# Patient Record
Sex: FEMALE | Race: WHITE | Employment: OTHER | URBAN - METROPOLITAN AREA
[De-identification: names, ages, dates, MRNs, and addresses within clinical notes are randomized per-mention and may not be internally consistent; named-entity substitution may affect disease eponyms.]

---

## 2020-07-20 PROBLEM — R53.82 CHRONIC FATIGUE: Status: ACTIVE | Noted: 2020-07-20

## 2020-07-20 PROBLEM — F41.8 DEPRESSION WITH ANXIETY: Status: ACTIVE | Noted: 2020-07-20

## 2020-07-20 PROBLEM — G89.4 CHRONIC PAIN SYNDROME: Status: ACTIVE | Noted: 2020-07-20

## 2020-07-21 PROBLEM — K21.9 GASTROESOPHAGEAL REFLUX DISEASE WITHOUT ESOPHAGITIS: Status: ACTIVE | Noted: 2020-07-21

## 2020-07-21 PROBLEM — F12.90 MARIJUANA USE: Status: ACTIVE | Noted: 2020-07-21

## 2020-07-21 PROBLEM — Z72.0 TOBACCO USE: Status: ACTIVE | Noted: 2020-07-21

## 2020-07-21 PROBLEM — E03.9 ACQUIRED HYPOTHYROIDISM: Status: ACTIVE | Noted: 2020-07-21

## 2020-07-21 PROBLEM — R63.4 ABNORMAL WEIGHT LOSS: Status: ACTIVE | Noted: 2020-07-21

## 2020-08-05 PROBLEM — F13.20 BENZODIAZEPINE DEPENDENCE (HCC): Status: ACTIVE | Noted: 2020-08-05

## 2020-08-05 PROBLEM — G47.34 NOCTURNAL HYPOXEMIA: Status: ACTIVE | Noted: 2020-08-05

## 2020-08-05 PROBLEM — F11.20 OPIATE DEPENDENCE (HCC): Status: ACTIVE | Noted: 2020-08-05

## 2020-08-24 PROBLEM — E55.9 VITAMIN D DEFICIENCY: Status: ACTIVE | Noted: 2020-08-24

## 2020-08-24 PROBLEM — E53.8 VITAMIN B12 DEFICIENCY: Status: ACTIVE | Noted: 2020-08-24

## 2020-08-24 PROBLEM — J43.8 OTHER EMPHYSEMA (HCC): Status: ACTIVE | Noted: 2020-08-24

## 2021-03-09 ENCOUNTER — VIRTUAL VISIT (OUTPATIENT)
Dept: FAMILY MEDICINE CLINIC | Age: 66
End: 2021-03-09
Payer: MEDICARE

## 2021-03-09 DIAGNOSIS — J43.8 OTHER EMPHYSEMA (HCC): ICD-10-CM

## 2021-03-09 DIAGNOSIS — F13.20 BENZODIAZEPINE DEPENDENCE (HCC): ICD-10-CM

## 2021-03-09 PROCEDURE — 99213 OFFICE O/P EST LOW 20 MIN: CPT | Performed by: INTERNAL MEDICINE

## 2021-03-09 RX ORDER — MOMETASONE FUROATE 50 UG/1
2 SPRAY, METERED NASAL DAILY
Qty: 3 CONTAINER | Refills: 3 | Status: SHIPPED | OUTPATIENT
Start: 2021-03-09 | End: 2021-10-27

## 2021-03-09 NOTE — PROGRESS NOTES
1. Have you been to the ER, urgent care clinic since your last visit? Hospitalized since your last visit? No    2. Have you seen or consulted any other health care providers outside of the [address] [place] [address] since your last visit? Include any pap smears or colon screening.  No       Learning Assessment 7/15/2020   PRIMARY LEARNER Patient   PRIMARY LANGUAGE ENGLISH   LEARNER PREFERENCE PRIMARY LISTENING   ANSWERED BY patient   RELATIONSHIP SELF

## 2021-03-31 ENCOUNTER — CLINICAL SUPPORT (OUTPATIENT)
Dept: FAMILY MEDICINE CLINIC | Age: 66
End: 2021-03-31
Payer: MEDICARE

## 2021-03-31 VITALS — TEMPERATURE: 97.6 F

## 2021-03-31 DIAGNOSIS — E03.9 ACQUIRED HYPOTHYROIDISM: ICD-10-CM

## 2021-03-31 DIAGNOSIS — R79.9 ABNORMAL FINDING OF BLOOD CHEMISTRY, UNSPECIFIED: ICD-10-CM

## 2021-03-31 DIAGNOSIS — R53.82 CHRONIC FATIGUE: ICD-10-CM

## 2021-03-31 DIAGNOSIS — E55.9 VITAMIN D DEFICIENCY: Primary | ICD-10-CM

## 2021-03-31 DIAGNOSIS — I10 ESSENTIAL HYPERTENSION: ICD-10-CM

## 2021-03-31 DIAGNOSIS — E53.8 VITAMIN B12 DEFICIENCY: ICD-10-CM

## 2021-03-31 PROCEDURE — 36415 COLL VENOUS BLD VENIPUNCTURE: CPT | Performed by: INTERNAL MEDICINE

## 2021-03-31 NOTE — PROGRESS NOTES
Consent:  She and/or her healthcare decision maker is aware that this patient-initiated Telehealth encounter is a billable service, with coverage as determined by her insurance carrier. She is aware that she may receive a bill and has provided verbal consent to proceed: Yes    I was in the office while conducting this encounter via the doxy. me platform. Murphy Pearce is a 77 y.o. female who was seen by synchronous (real-time) audio-video technology on 3/9/2021. Pt was seen at home. No other participants in this encounter. Subjective:   Request For New Medication (Nasal Spray.)    Patient presents today virtually to discuss continuation of medication for rhinitis. Used to have a prescription from her previous provider; however, hadn't needed it until recently. She has been suffering from congestion, runny nose, and thinks this may be secondary to the pollen that is in the air since she is now living in [place] versus [place]. Otherwise she has been doing well and is feeling good. PMH, SH, Medications/Allergies: reviewed, on chart. Current Outpatient Medications   Medication Sig    mometasone (NASONEX) 50 mcg/actuation nasal spray 2 Sprays by Both Nostrils route daily.  lisinopriL (PRINIVIL, ZESTRIL) 20 mg tablet Take 0.5 Tabs by mouth daily. Indications: high blood pressure    levothyroxine (SYNTHROID) 125 mcg tablet Take 1 Tab by mouth Daily (before breakfast).  clonazePAM (KlonoPIN) 0.5 mg tablet Take 1.5 mg by mouth nightly. Per patient    melatonin 1 mg tablet Take 1 mg by mouth nightly.  ibuprofen (MOTRIN) 800 mg tablet Take 800 mg by mouth as needed for Pain.  aspirin 81 mg chewable tablet Take 81 mg by mouth daily. No current facility-administered medications for this visit. No Known Allergies    ROS:  Constitutional: No fever, chills or abnormal weight loss  Respiratory: No cough, SOB   CV: No chest pain or Palpitations    VS review:   Wt Readings from Last 3 Encounters:   08/19/20 146 lb (66.2 kg)   07/15/20 152 lb 8.9 oz (69.2 kg)   07/15/20 146 lb 3.2 oz (66.3 kg)     BP Readings from Last 3 Encounters:   08/19/20 120/78   07/15/20 132/72   07/15/20 110/80       Objective:     General: alert, cooperative, no distress   Mental  status: mental status: alert, oriented to person, place, and time, normal mood, behavior, speech, dress, motor activity, and thought processes   Resp: resp: normal effort and no respiratory distress   Neuro: neuro: no gross deficits   Skin: skin: no discoloration or lesions of concern on visible areas       Assessment & Plan:     Allergic rhinitis-we will refill her mometasone nasal spray. If this is not sufficient, consider addition of Astelin or Singulair for allergic rhinitis. Try to avoid triggers. Follow-up as regularly scheduled. Ewelina Huynh DO      Due to this being a TeleHealth evaluation, many elements of the physical examination are unable to be assessed. We discussed the expected course, resolution and complications of the diagnosis(es) in detail. Medication risks, benefits, costs, interactions, and alternatives were discussed as indicated. I advised her to contact the office if her condition worsens, changes or fails to improve as anticipated. She expressed understanding with the diagnosis(es) and plan. Pursuant to the emergency declaration under the [facility], [facility] waiver authority and the [employer] General Act, this Virtual  Visit was conducted, with patient's consent, to reduce the patient's risk of exposure to COVID-19 and provide continuity of care for an established patient. Services were provided through a video synchronous discussion virtually to substitute for in-person clinic visit. Services were provided through a synchronous discussion virtually to substitute for in-person clinic visit. I was in the office.  The patient was at home.     CPT Codes 15078-81777 for Established Patients may apply to this Telehealth Visit

## 2021-03-31 NOTE — PROGRESS NOTES
Pt presents to clinic for lab work. She denies sick sx today. Labs drawn from [facility], 1 attempt. Pt tolerated well.  KT

## 2021-04-01 LAB
25(OH)D3 SERPL-MCNC: 17.1 NG/ML (ref 30–100)
ALBUMIN SERPL-MCNC: 4.2 G/DL (ref 3.5–5)
ALBUMIN/GLOB SERPL: 1.2 {RATIO} (ref 1.1–2.2)
ALP SERPL-CCNC: 88 U/L (ref 45–117)
ALT SERPL-CCNC: 15 U/L (ref 12–78)
ANION GAP SERPL CALC-SCNC: 6 MMOL/L (ref 5–15)
AST SERPL-CCNC: 8 U/L (ref 15–37)
BASOPHILS # BLD: 0.1 K/UL (ref 0–0.1)
BASOPHILS NFR BLD: 1 % (ref 0–1)
BILIRUB SERPL-MCNC: 0.8 MG/DL (ref 0.2–1)
BUN SERPL-MCNC: 14 MG/DL (ref 6–20)
BUN/CREAT SERPL: 21 (ref 12–20)
CALCIUM SERPL-MCNC: 9.3 MG/DL (ref 8.5–10.1)
CHLORIDE SERPL-SCNC: 106 MMOL/L (ref 97–108)
CO2 SERPL-SCNC: 28 MMOL/L (ref 21–32)
COMMENT, HOLDF: NORMAL
CREAT SERPL-MCNC: 0.67 MG/DL (ref 0.55–1.02)
DIFFERENTIAL METHOD BLD: ABNORMAL
EOSINOPHIL # BLD: 0.1 K/UL (ref 0–0.4)
EOSINOPHIL NFR BLD: 1 % (ref 0–7)
ERYTHROCYTE [DISTWIDTH] IN BLOOD BY AUTOMATED COUNT: 13 % (ref 11.5–14.5)
FOLATE SERPL-MCNC: 64.1 NG/ML (ref 5–21)
GLOBULIN SER CALC-MCNC: 3.6 G/DL (ref 2–4)
GLUCOSE SERPL-MCNC: 94 MG/DL (ref 65–100)
HCT VFR BLD AUTO: 47.7 % (ref 35–47)
HGB BLD-MCNC: 15.6 G/DL (ref 11.5–16)
IMM GRANULOCYTES # BLD AUTO: 0 K/UL (ref 0–0.04)
IMM GRANULOCYTES NFR BLD AUTO: 0 % (ref 0–0.5)
LYMPHOCYTES # BLD: 2.6 K/UL (ref 0.8–3.5)
LYMPHOCYTES NFR BLD: 25 % (ref 12–49)
MCH RBC QN AUTO: 32.8 PG (ref 26–34)
MCHC RBC AUTO-ENTMCNC: 32.7 G/DL (ref 30–36.5)
MCV RBC AUTO: 100.2 FL (ref 80–99)
MONOCYTES # BLD: 0.7 K/UL (ref 0–1)
MONOCYTES NFR BLD: 7 % (ref 5–13)
NEUTS SEG # BLD: 6.9 K/UL (ref 1.8–8)
NEUTS SEG NFR BLD: 66 % (ref 32–75)
NRBC # BLD: 0 K/UL (ref 0–0.01)
NRBC BLD-RTO: 0 PER 100 WBC
PLATELET # BLD AUTO: 224 K/UL (ref 150–400)
PMV BLD AUTO: 11.3 FL (ref 8.9–12.9)
POTASSIUM SERPL-SCNC: 3.9 MMOL/L (ref 3.5–5.1)
PROT SERPL-MCNC: 7.8 G/DL (ref 6.4–8.2)
RBC # BLD AUTO: 4.76 M/UL (ref 3.8–5.2)
SAMPLES BEING HELD,HOLD: NORMAL
SODIUM SERPL-SCNC: 140 MMOL/L (ref 136–145)
T4 FREE SERPL-MCNC: 1.1 NG/DL (ref 0.8–1.5)
TSH SERPL DL<=0.05 MIU/L-ACNC: 4.37 UIU/ML (ref 0.36–3.74)
VIT B12 SERPL-MCNC: 1038 PG/ML (ref 193–986)
WBC # BLD AUTO: 10.4 K/UL (ref 3.6–11)

## 2021-04-02 LAB
T3RU NFR SERPL: 33 % (ref 24–39)
THYROGLOB AB SERPL-ACNC: 28.7 IU/ML (ref 0–0.9)
THYROPEROXIDASE AB SERPL-ACNC: >600 IU/ML (ref 0–34)

## 2021-04-07 ENCOUNTER — TELEPHONE (OUTPATIENT)
Dept: FAMILY MEDICINE CLINIC | Age: 66
End: 2021-04-07

## 2021-04-07 NOTE — TELEPHONE ENCOUNTER
Sp/w Annemarie in the lab, she is going to have to investigate why A1C and Lipid panel wasn't done. She will call me back.  KT

## 2021-04-07 NOTE — TELEPHONE ENCOUNTER
----- Message from Brooklynn Johnson sent at 4/7/2021  3:29 PM EDT -----  Regarding: Dr. Joan Sanders first and last name: pt      Reason for call: would like to know test results      Callback required yes/no and why: yes      Best contact number(s): [phone]      Details to clarify the request:      Brooklynn Johnson

## 2021-04-09 RX ORDER — ERGOCALCIFEROL 1.25 MG/1
50000 CAPSULE ORAL
Qty: 12 CAP | Refills: 0 | Status: SHIPPED | OUTPATIENT
Start: 2021-04-09 | End: 2021-06-26

## 2021-04-09 RX ORDER — LEVOTHYROXINE SODIUM 150 UG/1
150 TABLET ORAL
Qty: 90 TAB | Refills: 1 | Status: SHIPPED | OUTPATIENT
Start: 2021-04-09 | End: 2021-10-18 | Stop reason: SDUPTHER

## 2021-04-09 RX ORDER — LEVOTHYROXINE SODIUM 25 UG/1
25 TABLET ORAL
Qty: 30 TAB | Refills: 1 | Status: SHIPPED | OUTPATIENT
Start: 2021-04-09 | End: 2021-10-18

## 2021-04-11 NOTE — PROGRESS NOTES
Labs indicate that she has an antibody that is causing her to have a low thyroid.  We can recheck your TSH in 6-8 weeks to see if we need to readjust your medication. Vit D low, sent in a supplement. Everything else, overall, looks good!

## 2021-04-12 NOTE — PROGRESS NOTES
Patient verified stating name and date of birth. Patient informed of lab results and states understanding. She was unclear what her new dose of Thyroid medication is  And does she start this now or wilt until her next Thyroid blood work is done.

## 2021-04-13 ENCOUNTER — TELEPHONE (OUTPATIENT)
Dept: FAMILY MEDICINE CLINIC | Age: 66
End: 2021-04-13

## 2021-04-13 NOTE — TELEPHONE ENCOUNTER
----- Message from Sophie Reina sent at 4/12/2021 12:41 PM EDT -----  Regarding: /Telephone      General Message/Vendor Calls    Caller's first and last name: Self      Reason for call: Pt needs clarification on medications and would like to  lab results. Callback required yes/no and why: Yes to assist.       Best contact number(s): [phone]      Details to clarify the request: Pt has questions about medication that was sent to pharmacy for her, Thyroid medications one in 150mg and one 50mg as well as Vitamin D2. Pt was not aware of any changes in her meds and needs clarification.        Sophie Huang

## 2021-05-05 RX ORDER — LEVOTHYROXINE SODIUM 25 UG/1
25 TABLET ORAL
Qty: 30 TAB | Refills: 1 | OUTPATIENT
Start: 2021-05-05

## 2021-07-27 ENCOUNTER — OFFICE VISIT (OUTPATIENT)
Dept: FAMILY MEDICINE CLINIC | Age: 66
End: 2021-07-27
Payer: MEDICARE

## 2021-07-27 VITALS
WEIGHT: 157.8 LBS | RESPIRATION RATE: 16 BRPM | SYSTOLIC BLOOD PRESSURE: 160 MMHG | OXYGEN SATURATION: 98 % | HEART RATE: 73 BPM | BODY MASS INDEX: 24.77 KG/M2 | HEIGHT: 67 IN | DIASTOLIC BLOOD PRESSURE: 90 MMHG | TEMPERATURE: 97.1 F

## 2021-07-27 DIAGNOSIS — R53.82 CHRONIC FATIGUE: ICD-10-CM

## 2021-07-27 DIAGNOSIS — F32.A DEPRESSION, UNSPECIFIED DEPRESSION TYPE: ICD-10-CM

## 2021-07-27 DIAGNOSIS — E03.9 ACQUIRED HYPOTHYROIDISM: ICD-10-CM

## 2021-07-27 DIAGNOSIS — I10 ESSENTIAL HYPERTENSION: Primary | ICD-10-CM

## 2021-07-27 DIAGNOSIS — E55.9 VITAMIN D DEFICIENCY: ICD-10-CM

## 2021-07-27 PROCEDURE — 36415 COLL VENOUS BLD VENIPUNCTURE: CPT | Performed by: NURSE PRACTITIONER

## 2021-07-27 PROCEDURE — 99214 OFFICE O/P EST MOD 30 MIN: CPT | Performed by: NURSE PRACTITIONER

## 2021-07-27 RX ORDER — CHOLECALCIFEROL (VITAMIN D3) 125 MCG
CAPSULE ORAL
COMMUNITY

## 2021-07-27 RX ORDER — TRAZODONE HYDROCHLORIDE 50 MG/1
50 TABLET ORAL
Qty: 30 TABLET | Refills: 2 | Status: SHIPPED | OUTPATIENT
Start: 2021-07-27 | End: 2021-08-23 | Stop reason: SDUPTHER

## 2021-07-27 NOTE — PROGRESS NOTES
1. Have you been to the ER, urgent care clinic since your last visit? Hospitalized since your last visit? No    2. Have you seen or consulted any other health care providers outside of the [address] [place] [address] since your last visit? Include any pap smears or colon screening.  No      Chief Complaint   Patient presents with    Fatigue    Thyroid Problem     need labs    Cholesterol Problem     need labs         Visit Vitals  BP (!) 160/90 (BP 1 Location: Right upper arm, BP Patient Position: Sitting, BP Cuff Size: Large adult)   Pulse 73   Temp 97.1 °F (36.2 °C) (Temporal)   Resp 16   Ht 5' 7\" (1.702 m)   Wt 157 lb 12.8 oz (71.6 kg)   SpO2 98%   BMI 24.71 kg/m²       Pain Scale: 7/10  Pain Location: Generalized

## 2021-07-28 LAB
25(OH)D3 SERPL-MCNC: 46.1 NG/ML (ref 30–100)
ALBUMIN SERPL-MCNC: 4.3 G/DL (ref 3.5–5)
ALBUMIN/GLOB SERPL: 1.2 {RATIO} (ref 1.1–2.2)
ALP SERPL-CCNC: 72 U/L (ref 45–117)
ALT SERPL-CCNC: 16 U/L (ref 12–78)
ANION GAP SERPL CALC-SCNC: 5 MMOL/L (ref 5–15)
AST SERPL-CCNC: 12 U/L (ref 15–37)
BASOPHILS # BLD: 0.1 K/UL (ref 0–0.1)
BASOPHILS NFR BLD: 1 % (ref 0–1)
BILIRUB SERPL-MCNC: 0.6 MG/DL (ref 0.2–1)
BUN SERPL-MCNC: 11 MG/DL (ref 6–20)
BUN/CREAT SERPL: 17 (ref 12–20)
CALCIUM SERPL-MCNC: 10 MG/DL (ref 8.5–10.1)
CHLORIDE SERPL-SCNC: 109 MMOL/L (ref 97–108)
CHOLEST SERPL-MCNC: 220 MG/DL
CO2 SERPL-SCNC: 30 MMOL/L (ref 21–32)
CREAT SERPL-MCNC: 0.64 MG/DL (ref 0.55–1.02)
DIFFERENTIAL METHOD BLD: ABNORMAL
EOSINOPHIL # BLD: 0.1 K/UL (ref 0–0.4)
EOSINOPHIL NFR BLD: 1 % (ref 0–7)
ERYTHROCYTE [DISTWIDTH] IN BLOOD BY AUTOMATED COUNT: 13.3 % (ref 11.5–14.5)
GLOBULIN SER CALC-MCNC: 3.5 G/DL (ref 2–4)
GLUCOSE SERPL-MCNC: 86 MG/DL (ref 65–100)
HCT VFR BLD AUTO: 46 % (ref 35–47)
HDLC SERPL-MCNC: 90 MG/DL
HDLC SERPL: 2.4 {RATIO} (ref 0–5)
HGB BLD-MCNC: 15.1 G/DL (ref 11.5–16)
IMM GRANULOCYTES # BLD AUTO: 0.1 K/UL (ref 0–0.04)
IMM GRANULOCYTES NFR BLD AUTO: 1 % (ref 0–0.5)
LDLC SERPL CALC-MCNC: 111.6 MG/DL (ref 0–100)
LYMPHOCYTES # BLD: 2.4 K/UL (ref 0.8–3.5)
LYMPHOCYTES NFR BLD: 26 % (ref 12–49)
MCH RBC QN AUTO: 33.6 PG (ref 26–34)
MCHC RBC AUTO-ENTMCNC: 32.8 G/DL (ref 30–36.5)
MCV RBC AUTO: 102.4 FL (ref 80–99)
MONOCYTES # BLD: 0.6 K/UL (ref 0–1)
MONOCYTES NFR BLD: 7 % (ref 5–13)
NEUTS SEG # BLD: 6 K/UL (ref 1.8–8)
NEUTS SEG NFR BLD: 64 % (ref 32–75)
NRBC # BLD: 0 K/UL (ref 0–0.01)
NRBC BLD-RTO: 0 PER 100 WBC
PLATELET # BLD AUTO: 226 K/UL (ref 150–400)
PMV BLD AUTO: 11 FL (ref 8.9–12.9)
POTASSIUM SERPL-SCNC: 4.8 MMOL/L (ref 3.5–5.1)
PROT SERPL-MCNC: 7.8 G/DL (ref 6.4–8.2)
RBC # BLD AUTO: 4.49 M/UL (ref 3.8–5.2)
SODIUM SERPL-SCNC: 144 MMOL/L (ref 136–145)
TRIGL SERPL-MCNC: 92 MG/DL (ref ?–150)
TSH SERPL DL<=0.05 MIU/L-ACNC: 9.63 UIU/ML (ref 0.36–3.74)
VLDLC SERPL CALC-MCNC: 18.4 MG/DL
WBC # BLD AUTO: 9.2 K/UL (ref 3.6–11)

## 2021-07-28 NOTE — ACP (ADVANCE CARE PLANNING)
Discussed importance of advanced medical directives with patient. Patient is capable of making decisions.  Bossman [place] NP-C

## 2021-07-28 NOTE — PROGRESS NOTES
Subjective: Fred Leon is a 74 y.o. female who presents today with the following:  Chief Complaint   Patient presents with    Fatigue    Thyroid Problem     need labs    Cholesterol Problem     need labs       Patient Active Problem List   Diagnosis Code    Chronic fatigue R53.82    Chronic pain syndrome G89.4    Depression with anxiety F41.8    Abnormal weight loss R63.4    Acquired hypothyroidism E03.9    Gastroesophageal reflux disease without esophagitis K21.9    Tobacco use Z72.0    Marijuana use F12.90    Nocturnal hypoxemia G47.34    Opiate dependence (HCC) F11.20    Benzodiazepine dependence (HCC) F13.20    Other emphysema (HCC) J43.8    Vitamin B12 deficiency E53.8    Vitamin D deficiency E55.9         COMPLIANT WITH MEDICATION:   HTN; Denies chest pain, dyspnea, palpitations, headache and blurred vision. Blood pressure normotensive. depression screening addressed active having intermittent thoughts about her daughter that passed away. abuse screening addressed denies    learning assessment addressed reviewed nurses notes    fall risk addressed not at risk    HM: addressed     ROS:  Gen: denies fever, chills, fatigue, weight loss, weight gain  HEENT:denies blurry vision, nasal congestion, sore throat  Resp: denies dypsnea, cough, wheezing  CV: denies chest pain radiating to the jaws or arms, palpitations, lower extremity edema  Abd: denies nausea, vomiting, diarrhea, constipation  Neuro: denies numbness/tingling  Endo: denies polyuria, polydipsia, heat/cold intolerance  Heme: no lymphadenopathy    No Known Allergies      Current Outpatient Medications:     melatonin 5 mg tablet, Take  by mouth nightly. Takes 2 at bedtime, Disp: , Rfl:     traZODone (DESYREL) 50 mg tablet, Take 1 Tablet by mouth nightly. Indications: insomnia, Disp: 30 Tablet, Rfl: 2    levothyroxine (SYNTHROID) 25 mcg tablet, Take 1 Tab by mouth Daily (before breakfast).  To be added to the 125 mcg until she has completed those tablets for a total of 150 mcg, Disp: 30 Tab, Rfl: 1    lisinopriL (PRINIVIL, ZESTRIL) 20 mg tablet, Take 0.5 Tabs by mouth daily. Indications: high blood pressure, Disp: 45 Tab, Rfl: 3    levothyroxine (SYNTHROID) 125 mcg tablet, Take 1 Tab by mouth Daily (before breakfast). , Disp: 90 Tab, Rfl: 3    aspirin 81 mg chewable tablet, Take 81 mg by mouth daily. , Disp: , Rfl:     levothyroxine (SYNTHROID) 150 mcg tablet, Take 1 Tab by mouth Daily (before breakfast). (Patient not taking: Reported on 7/27/2021), Disp: 90 Tab, Rfl: 1    mometasone (NASONEX) 50 mcg/actuation nasal spray, 2 Sprays by Both Nostrils route daily. (Patient not taking: Reported on 7/27/2021), Disp: 3 Container, Rfl: 3    clonazePAM (KlonoPIN) 0.5 mg tablet, Take 1.5 mg by mouth nightly. Per patient (Patient not taking: Reported on 7/27/2021), Disp: , Rfl:     melatonin 1 mg tablet, Take 1 mg by mouth nightly. (Patient not taking: Reported on 7/27/2021), Disp: , Rfl:     ibuprofen (MOTRIN) 800 mg tablet, Take 800 mg by mouth as needed for Pain.  (Patient not taking: Reported on 7/27/2021), Disp: , Rfl:     Past Medical History:   Diagnosis Date    Cataract     Chronic pain of right groin     after multiple complications from bladder sling that became imbedded    GERD (gastroesophageal reflux disease)     HTN (hypertension)     Infiltrating ductal carcinoma of overlapping sites of both breasts in [facility])     Thyroid disease     UTI (urinary tract infection)        Past Surgical History:   Procedure Laterality Date    HX BLADDER SUSPENSION      Removed     HX CATARACT REMOVAL Bilateral     HX HYSTERECTOMY      HX LYMPHADENECTOMY      HX TONSILLECTOMY      VT BIOPSY OF BREAST, INCISIONAL Bilateral        Social History     Tobacco Use   Smoking Status Current Every Day Smoker    Packs/day: 1.00    Types: Cigarettes   Smokeless Tobacco Never Used       Social History     Socioeconomic History    Marital status:      Spouse name: Not on file    Number of children: Not on file    Years of education: Not on file    Highest education level: Not on file   Tobacco Use    Smoking status: Current Every Day Smoker     Packs/day: 1.00     Types: Cigarettes    Smokeless tobacco: Never Used   Vaping Use    Vaping Use: Former   Substance and Sexual Activity    Alcohol use: Yes     Comment: rarely    Drug use: Yes     Types: Marijuana, Prescription     Comment: nightly(pain relief)    Sexual activity: Not Currently   Other Topics Concern     Service No    Blood Transfusions No    Caffeine Concern No    Occupational Exposure No    Hobby Hazards No    Sleep Concern No    Stress Concern No    Weight Concern Yes    Special Diet No    Back Care Yes    Exercise Yes    Bike Helmet No    Seat Belt Yes    Self-Exams Yes     Social Determinants of Health     Financial Resource Strain:     Difficulty of Paying Living Expenses:    Food Insecurity:     Worried About Running Out of Food in the Last Year:     [address] in the Last Year:    Transportation Needs:     Lack of Transportation (Medical):      Lack of Transportation (Non-Medical):    Physical Activity:     Days of Exercise per Week:     Minutes of Exercise per Session:    Stress:     Feeling of Stress :    Social Connections:     Frequency of Communication with Friends and Family:     Frequency of Social Gatherings with Friends and Family:     Attends [employer] Services:     Active Member of Clubs or Organizations:     Attends Club or Organization Meetings:     Marital Status:        Family History   Problem Relation Age of Onset    MS Sister          Objective:     Visit Vitals  BP (!) 160/90 (BP 1 Location: Right upper arm, BP Patient Position: Sitting, BP Cuff Size: Large adult)   Pulse 73   Temp 97.1 °F (36.2 °C) (Temporal)   Resp 16   Ht 5' 7\" (1.702 m)   Wt 157 lb 12.8 oz (71.6 kg)   SpO2 98%   BMI 24.71 kg/m² Body mass index is 24.71 kg/m². General: Alert and oriented. No acute distress. Well nourished  HEENT :  Ears:TMs are normal. Canals are clear. Eyes: pupils equal, round, react to light and accommodation. Extra ocular movements intact. Nose: patent. Mouth and throat is clear. Neck:supple full range of motion no thyromegaly. Trachea midline, No carotid bruits. No significant lymphadenopathy  Lungs[de-identified] clear to auscultation without wheezes, rales, or rhonchi. Heart :RRR, S1 & S2 are normal intensity. No murmur; no gallop  Abdomen: bowel sounds active. No tenderness, guarding, rebound, masses, hepatic or spleen enlargement  Back: no CVA tenderness. Extremities: without clubbing, cyanosis, or edema  Pulses: radial and femoral pulses are normal  Neuro: HMF intact. Cranial nerves II through XII grossly normal.  Motor: is 5 over 5 and symmetrical.   Deep tendon reflexes: +2 equal  Psych:appropriate behavior, mood, affect and judgement.    Results for orders placed or performed in visit on 03/31/21   VITAMIN B12 & FOLATE   Result Value Ref Range    Vitamin B12 1,038 (H) 193 - 986 pg/mL    Folate 64.1 (H) 5.0 - 21.0 ng/mL   THYROID ANTIBODY PANEL   Result Value Ref Range    Thyroid peroxidase Ab >600 (H) 0 - 34 IU/mL    Thyroglobulin Ab 28.7 (H) 0.0 - 0.9 IU/mL   T3 UPTAKE PROFILE   Result Value Ref Range    T3 Uptake 33 24 - 39 %   T4, FREE   Result Value Ref Range    T4, Free 1.1 0.8 - 1.5 NG/DL   TSH 3RD GENERATION   Result Value Ref Range    TSH 4.37 (H) 0.36 - 3.74 uIU/mL   VITAMIN D, 25 HYDROXY   Result Value Ref Range    Vitamin D 25-Hydroxy 17.1 (L) 30 - 100 ng/mL   CBC WITH AUTOMATED DIFF   Result Value Ref Range    WBC 10.4 3.6 - 11.0 K/uL    RBC 4.76 3.80 - 5.20 M/uL    HGB 15.6 11.5 - 16.0 g/dL    HCT 47.7 (H) 35.0 - 47.0 %    .2 (H) 80.0 - 99.0 FL    MCH 32.8 26.0 - 34.0 PG    MCHC 32.7 30.0 - 36.5 g/dL    RDW 13.0 11.5 - 14.5 %    PLATELET 973 846 - 680 K/uL    MPV 11.3 8.9 - 12.9 FL    NRBC 0.0 0  WBC    ABSOLUTE NRBC 0.00 0.00 - 0.01 K/uL    NEUTROPHILS 66 32 - 75 %    LYMPHOCYTES 25 12 - 49 %    MONOCYTES 7 5 - 13 %    EOSINOPHILS 1 0 - 7 %    BASOPHILS 1 0 - 1 %    IMMATURE GRANULOCYTES 0 0.0 - 0.5 %    ABS. NEUTROPHILS 6.9 1.8 - 8.0 K/UL    ABS. LYMPHOCYTES 2.6 0.8 - 3.5 K/UL    ABS. MONOCYTES 0.7 0.0 - 1.0 K/UL    ABS. EOSINOPHILS 0.1 0.0 - 0.4 K/UL    ABS. BASOPHILS 0.1 0.0 - 0.1 K/UL    ABS. IMM. GRANS. 0.0 0.00 - 0.04 K/UL    DF AUTOMATED     METABOLIC PANEL, COMPREHENSIVE   Result Value Ref Range    Sodium 140 136 - 145 mmol/L    Potassium 3.9 3.5 - 5.1 mmol/L    Chloride 106 97 - 108 mmol/L    CO2 28 21 - 32 mmol/L    Anion gap 6 5 - 15 mmol/L    Glucose 94 65 - 100 mg/dL    BUN 14 6 - 20 MG/DL    Creatinine 0.67 0.55 - 1.02 MG/DL    BUN/Creatinine ratio 21 (H) 12 - 20      GFR est AA >60 >60 ml/min/1.73m2    GFR est non-AA >60 >60 ml/min/1.73m2    Calcium 9.3 8.5 - 10.1 MG/DL    Bilirubin, total 0.8 0.2 - 1.0 MG/DL    ALT (SGPT) 15 12 - 78 U/L    AST (SGOT) 8 (L) 15 - 37 U/L    Alk. phosphatase 88 45 - 117 U/L    Protein, total 7.8 6.4 - 8.2 g/dL    Albumin 4.2 3.5 - 5.0 g/dL    Globulin 3.6 2.0 - 4.0 g/dL    A-G Ratio 1.2 1.1 - 2.2     SAMPLES BEING HELD   Result Value Ref Range    SAMPLES BEING HELD 1LAV     COMMENT        Add-on orders for these samples will be processed based on acceptable specimen integrity and analyte stability, which may vary by analyte. No results found for this visit on 07/27/21. Assessment/ Plan:     1. Vitamin D deficiency    - CBC WITH AUTOMATED DIFF; Future  - LIPID PANEL; Future  - METABOLIC PANEL, COMPREHENSIVE; Future  - COLLECTION VENOUS BLOOD,VENIPUNCTURE; Future  - TSH 3RD GENERATION; Future  - VITAMIN D, 25 HYDROXY; Future  - VITAMIN D, 25 HYDROXY  - TSH 3RD GENERATION  - COLLECTION VENOUS BLOOD,VENIPUNCTURE  - METABOLIC PANEL, COMPREHENSIVE  - LIPID PANEL  - CBC WITH AUTOMATED DIFF    2.  Acquired hypothyroidism  - CBC WITH AUTOMATED DIFF; Future  - LIPID PANEL; Future  - METABOLIC PANEL, COMPREHENSIVE; Future  - COLLECTION VENOUS BLOOD,VENIPUNCTURE; Future  - TSH 3RD GENERATION; Future  - VITAMIN D, 25 HYDROXY; Future  - VITAMIN D, 25 HYDROXY  - TSH 3RD GENERATION  - COLLECTION VENOUS BLOOD,VENIPUNCTURE  - METABOLIC PANEL, COMPREHENSIVE  - LIPID PANEL  - CBC WITH AUTOMATED DIFF    3. Essential hypertension  BP elevated She had stopped all of her medications   - CBC WITH AUTOMATED DIFF; Future  - LIPID PANEL; Future  - METABOLIC PANEL, COMPREHENSIVE; Future  - COLLECTION VENOUS BLOOD,VENIPUNCTURE; Future  - TSH 3RD GENERATION; Future  - VITAMIN D, 25 HYDROXY; Future  - VITAMIN D, 25 HYDROXY  - TSH 3RD GENERATION  - COLLECTION VENOUS BLOOD,VENIPUNCTURE  - METABOLIC PANEL, COMPREHENSIVE  - LIPID PANEL  - CBC WITH AUTOMATED DIFF    4. Chronic fatigue    - CBC WITH AUTOMATED DIFF; Future  - LIPID PANEL; Future  - METABOLIC PANEL, COMPREHENSIVE; Future  - COLLECTION VENOUS BLOOD,VENIPUNCTURE; Future  - TSH 3RD GENERATION; Future  - VITAMIN D, 25 HYDROXY; Future  - VITAMIN D, 25 HYDROXY  - TSH 3RD GENERATION  - COLLECTION VENOUS BLOOD,VENIPUNCTURE  - METABOLIC PANEL, COMPREHENSIVE  - LIPID PANEL  - CBC WITH AUTOMATED DIFF    5. Depression, unspecified depression type  Start trazodone to help with depression and insomnia. Starting to see a therapist this week. No suicidal thoughts or plans to harm herself or others. Instructed to call 911 or contact provider if condition worsesn  - CBC WITH AUTOMATED DIFF; Future  - LIPID PANEL; Future  - METABOLIC PANEL, COMPREHENSIVE; Future  - COLLECTION VENOUS BLOOD,VENIPUNCTURE; Future  - TSH 3RD GENERATION;  Future  - VITAMIN D, 25 HYDROXY; Future  - VITAMIN D, 25 HYDROXY  - TSH 3RD GENERATION  - COLLECTION VENOUS BLOOD,VENIPUNCTURE  - METABOLIC PANEL, COMPREHENSIVE  - LIPID PANEL  - CBC WITH AUTOMATED DIFF      Orders Placed This Encounter    COLLECTION VENOUS BLOOD,VENIPUNCTURE     Standing Status:   Future Number of Occurrences:   1     Standing Expiration Date:   8/27/2021    CBC WITH AUTOMATED DIFF     Standing Status:   Future     Number of Occurrences:   1     Standing Expiration Date:   8/27/2021    LIPID PANEL     Standing Status:   Future     Number of Occurrences:   1     Standing Expiration Date:   8/87/0047    METABOLIC PANEL, COMPREHENSIVE     Standing Status:   Future     Number of Occurrences:   1     Standing Expiration Date:   8/27/2021    TSH 3RD GENERATION     Standing Status:   Future     Number of Occurrences:   1     Standing Expiration Date:   7/27/2022    VITAMIN D, 25 HYDROXY     Standing Status:   Future     Number of Occurrences:   1     Standing Expiration Date:   7/27/2022    melatonin 5 mg tablet     Sig: Take  by mouth nightly. Takes 2 at bedtime    traZODone (DESYREL) 50 mg tablet     Sig: Take 1 Tablet by mouth nightly. Indications: insomnia     Dispense:  30 Tablet     Refill:  2         Verbal and written instructions (see AVS) provided. Patient expresses understanding of diagnosis and treatment plan.     Health Maintenance Due   Topic Date Due    Breast Cancer Screen Mammogram  Never done    Lipid Screen  Never done    Colorectal Cancer Screening Combo  Never done    Shingrix Vaccine Age 50> (1 of 2) Never done    Medicare Yearly Exam  08/20/2021               ANDREW Horner

## 2021-07-29 ENCOUNTER — TELEPHONE (OUTPATIENT)
Dept: FAMILY MEDICINE CLINIC | Age: 66
End: 2021-07-29

## 2021-07-29 RX ORDER — LEVOTHYROXINE SODIUM 25 UG/1
25 TABLET ORAL
Qty: 90 TABLET | Refills: 0 | Status: SHIPPED | OUTPATIENT
Start: 2021-07-29 | End: 2021-10-18

## 2021-07-29 NOTE — PROGRESS NOTES
Patient verified stating name and date of birth. Patient informed of lab results and states understanding. She needs to know what does of her thyroid, she has 125 mg but will need 25 mg to take 150 mg she had been taking per DR Solis.  Please send to [facility] in [place] , please advise

## 2021-07-29 NOTE — PROGRESS NOTES
Metabolic panel good liver and kidney functions   Lipid panel LDL less desirable cholesterol is slightly elevated . Encourage heart healthy diet. Lots of fruits and vegetables , lean meats , chicken and fish.    CBC no anemia

## 2021-07-29 NOTE — TELEPHONE ENCOUNTER
----- Message from Michelle Anderson sent at 7/29/2021 11:02 AM EDT -----  Regarding: SERAFIN Dos Santos/ Telephone  Patient return call    Caller's first and last name and relationship (if not the patient): n/a      Best contact number(s): [phone]      Whose call is being returned: David Marroquin      Details to clarify the request: Returning call to receive lab results      Michelle Anderson

## 2021-07-29 NOTE — PROGRESS NOTES
Good news vitamin D level is perfect! TSH is doubled since last check . Please restart levothyroxine. Does she need a refill ? Which pharmacy?

## 2021-08-02 NOTE — PROGRESS NOTES
Patient verified stating name and date of birth. Patient informed of lab results and states understanding.  What dose do you want her thyroid Rx

## 2021-08-02 NOTE — PROGRESS NOTES
Toniei she has 125 mg of her thyroid medication, if you you want her to take 150 mcg she will need 25 mg

## 2021-08-04 ENCOUNTER — TELEPHONE (OUTPATIENT)
Dept: FAMILY MEDICINE CLINIC | Age: 66
End: 2021-08-04

## 2021-08-04 NOTE — TELEPHONE ENCOUNTER
----- Message from Alba Fleischer sent at 8/4/2021 11:52 AM EDT -----  Regarding: SERAFIN Dos Santos/Telephone  Patient return call    Caller's first and last name and relationship (if not the patient): pt      Best contact number(s): [phone]      Whose call is being returned: Emaline Net      Details to clarify the request: pt advised she does have the 150 of the levothyroxine and she didn't  the 125 because she had a whole bottle of the 150. No call back needed.       Alba Fleischer

## 2021-10-15 VITALS — HEIGHT: 66 IN | BODY MASS INDEX: 25.55 KG/M2 | WEIGHT: 159 LBS

## 2021-10-15 PROBLEM — M48.062 LUMBAR STENOSIS WITH NEUROGENIC CLAUDICATION: Status: ACTIVE | Noted: 2021-10-15

## 2021-10-15 PROBLEM — M43.10 SPONDYLISTHESIS: Status: ACTIVE | Noted: 2021-10-15

## 2021-10-18 ENCOUNTER — OFFICE VISIT (OUTPATIENT)
Dept: FAMILY MEDICINE CLINIC | Age: 66
End: 2021-10-18
Payer: MEDICARE

## 2021-10-18 DIAGNOSIS — Z23 ENCOUNTER FOR IMMUNIZATION: ICD-10-CM

## 2021-10-18 DIAGNOSIS — R79.9 ABNORMAL FINDING OF BLOOD CHEMISTRY, UNSPECIFIED: ICD-10-CM

## 2021-10-18 DIAGNOSIS — Z00.00 MEDICARE ANNUAL WELLNESS VISIT, SUBSEQUENT: ICD-10-CM

## 2021-10-18 DIAGNOSIS — I10 PRIMARY HYPERTENSION: ICD-10-CM

## 2021-10-18 DIAGNOSIS — E03.9 ACQUIRED HYPOTHYROIDISM: ICD-10-CM

## 2021-10-18 DIAGNOSIS — C50.411 MALIGNANT NEOPLASM OF UPPER-OUTER QUADRANT OF RIGHT FEMALE BREAST, UNSPECIFIED ESTROGEN RECEPTOR STATUS (HCC): ICD-10-CM

## 2021-10-18 DIAGNOSIS — Z12.31 ENCOUNTER FOR SCREENING MAMMOGRAM FOR MALIGNANT NEOPLASM OF BREAST: ICD-10-CM

## 2021-10-18 DIAGNOSIS — R93.89 ABNORMAL CXR: ICD-10-CM

## 2021-10-18 DIAGNOSIS — Z01.818 PREOP EXAMINATION: Primary | ICD-10-CM

## 2021-10-18 DIAGNOSIS — J42 CHRONIC BRONCHITIS, UNSPECIFIED CHRONIC BRONCHITIS TYPE (HCC): ICD-10-CM

## 2021-10-18 DIAGNOSIS — Z13.39 SCREENING FOR ALCOHOLISM: ICD-10-CM

## 2021-10-18 DIAGNOSIS — N64.89 OTHER SPECIFIED DISORDERS OF BREAST: ICD-10-CM

## 2021-10-18 LAB
INR BLD: 1
PT POC: 11.6 SECONDS
VALID INTERNAL CONTROL?: YES

## 2021-10-18 PROCEDURE — 90732 PPSV23 VACC 2 YRS+ SUBQ/IM: CPT

## 2021-10-18 PROCEDURE — G0009 ADMIN PNEUMOCOCCAL VACCINE: HCPCS

## 2021-10-18 PROCEDURE — 93010 ELECTROCARDIOGRAM REPORT: CPT | Performed by: NURSE PRACTITIONER

## 2021-10-18 PROCEDURE — 36415 COLL VENOUS BLD VENIPUNCTURE: CPT | Performed by: NURSE PRACTITIONER

## 2021-10-18 PROCEDURE — G0439 PPPS, SUBSEQ VISIT: HCPCS | Performed by: NURSE PRACTITIONER

## 2021-10-18 RX ORDER — LEVOTHYROXINE SODIUM 150 UG/1
150 TABLET ORAL
Qty: 90 TABLET | Refills: 1 | Status: SHIPPED | OUTPATIENT
Start: 2021-10-18 | End: 2021-10-19 | Stop reason: DRUGHIGH

## 2021-10-18 RX ORDER — HYDROCHLOROTHIAZIDE 12.5 MG/1
12.5 TABLET ORAL DAILY
Qty: 90 TABLET | Refills: 1 | Status: SHIPPED | OUTPATIENT
Start: 2021-10-18 | End: 2022-07-27

## 2021-10-18 RX ORDER — LISINOPRIL 20 MG/1
20 TABLET ORAL DAILY
Qty: 45 TABLET | Refills: 3
Start: 2021-10-18 | End: 2021-10-18 | Stop reason: SDUPTHER

## 2021-10-18 RX ORDER — LISINOPRIL 20 MG/1
20 TABLET ORAL DAILY
Qty: 90 TABLET | Refills: 3 | Status: SHIPPED | OUTPATIENT
Start: 2021-10-18 | End: 2022-11-01 | Stop reason: SDUPTHER

## 2021-10-18 NOTE — PROGRESS NOTES
This is the Subsequent Medicare Annual Wellness Exam, performed 12 months or more after the Initial AWV or the last Subsequent AWV    I have reviewed the patient's medical history in detail and updated the computerized patient record. Assessment/Plan   Education and counseling provided:  Are appropriate based on today's review and evaluation    1. Preop examination  -     VA ANNUAL ALCOHOL SCREEN 15 MIN  -     AMB POC EKG ROUTINE W/ 12 LEADS, INTER & REP  -     CBC WITH AUTOMATED DIFF; Future  -     LIPID PANEL; Future  -     METABOLIC PANEL, COMPREHENSIVE; Future  -     COLLECTION VENOUS BLOOD,VENIPUNCTURE; Future  -     TSH 3RD GENERATION; Future  -     AMB POC PT/INR  -     HEMOGLOBIN A1C WITH EAG; Future  -     URINALYSIS W/ REFLEX CULTURE; Future  2. Medicare annual wellness visit, subsequent  -     CBC WITH AUTOMATED DIFF; Future  -     LIPID PANEL; Future  -     METABOLIC PANEL, COMPREHENSIVE; Future  -     COLLECTION VENOUS BLOOD,VENIPUNCTURE; Future  -     TSH 3RD GENERATION; Future  -     AMB POC PT/INR  -     HEMOGLOBIN A1C WITH EAG; Future  3. Screening for alcoholism  -     CBC WITH AUTOMATED DIFF; Future  -     LIPID PANEL; Future  -     METABOLIC PANEL, COMPREHENSIVE; Future  -     COLLECTION VENOUS BLOOD,VENIPUNCTURE; Future  -     TSH 3RD GENERATION; Future  -     AMB POC PT/INR  -     HEMOGLOBIN A1C WITH EAG; Future  4. Malignant neoplasm of upper-outer quadrant of right female breast, unspecified estrogen receptor status ([facility] [address]  -     [facility] 3D USAMA W MAMMO BI SCREENING INCL CAD; Future  -     CBC WITH AUTOMATED DIFF; Future  -     LIPID PANEL; Future  -     METABOLIC PANEL, COMPREHENSIVE; Future  -     COLLECTION VENOUS BLOOD,VENIPUNCTURE; Future  -     TSH 3RD GENERATION; Future  -     AMB POC PT/INR  -     HEMOGLOBIN A1C WITH EAG; Future  5. Encounter for screening mammogram for malignant neoplasm of breast   -     [facility] 3D USAMA W MAMMO BI SCREENING INCL CAD;  Future  -     CBC WITH AUTOMATED DIFF; Future  -     LIPID PANEL; Future  -     METABOLIC PANEL, COMPREHENSIVE; Future  -     COLLECTION VENOUS BLOOD,VENIPUNCTURE; Future  -     TSH 3RD GENERATION; Future  -     AMB POC PT/INR  -     HEMOGLOBIN A1C WITH EAG; Future  6. Encounter for immunization  -     PNEUMOCOCCAL POLYSACCHARIDE VACCINE, 23-VALENT, ADULT OR IMMUNOSUPPRESSED PT DOSE,  -     ADMIN PNEUMOCOCCAL VACCINE  -     CBC WITH AUTOMATED DIFF; Future  -     LIPID PANEL; Future  -     METABOLIC PANEL, COMPREHENSIVE; Future  -     COLLECTION VENOUS BLOOD,VENIPUNCTURE; Future  -     TSH 3RD GENERATION; Future  -     AMB POC PT/INR  -     HEMOGLOBIN A1C WITH EAG; Future  7. Acquired hypothyroidism  -     CBC WITH AUTOMATED DIFF; Future  -     LIPID PANEL; Future  -     METABOLIC PANEL, COMPREHENSIVE; Future  -     COLLECTION VENOUS BLOOD,VENIPUNCTURE; Future  -     TSH 3RD GENERATION; Future  -     AMB POC PT/INR  -     HEMOGLOBIN A1C WITH EAG; Future  8. Primary hypertension  -     CBC WITH AUTOMATED DIFF; Future  -     LIPID PANEL; Future  -     METABOLIC PANEL, COMPREHENSIVE; Future  -     COLLECTION VENOUS BLOOD,VENIPUNCTURE; Future  -     TSH 3RD GENERATION; Future  -     AMB POC PT/INR  -     HEMOGLOBIN A1C WITH EAG; Future  9. Other specified disorders of breast   -     AMB POC PT/INR  10. Abnormal finding of blood chemistry, unspecified   -     HEMOGLOBIN A1C WITH EAG;  Future       Depression Risk Factor Screening     3 most recent PHQ Screens 7/27/2021   PHQ Not Done -   Little interest or pleasure in doing things Nearly every day   Feeling down, depressed, irritable, or hopeless Nearly every day   Total Score PHQ 2 6   Trouble falling or staying asleep, or sleeping too much Nearly every day   Feeling tired or having little energy Nearly every day   Poor appetite, weight loss, or overeating Several days   Feeling bad about yourself - or that you are a failure or have let yourself or your family down Several days   Trouble concentrating on things such as school, work, reading, or watching TV Nearly every day   Moving or speaking so slowly that other people could have noticed; or the opposite being so fidgety that others notice Several days   Thoughts of being better off dead, or hurting yourself in some way More than half the days   PHQ 9 Score 20   How difficult have these problems made it for you to do your work, take care of your home and get along with others Somewhat difficult       Alcohol Risk Screen    Do you average more than 1 drink per night or more than 7 drinks a week:  Yes    On any one occasion in the past three months have you have had more than 3 drinks containing alcohol:  No        Functional Ability and Level of Safety    Hearing: Hearing is good. Activities of Daily Living: The home contains: handrails  Patient does total self care      Ambulation: with mild difficulty     Fall Risk:  Fall Risk Assessment, last 12 mths 10/18/2021   Able to walk? Yes   Fall in past 12 months? 0   Do you feel unsteady? 1   Are you worried about falling 1   Is TUG test greater than 12 seconds? 0   Is the gait abnormal? 1   Number of falls in past 12 months 0   Fall with injury?  -      Abuse Screen:  Patient is not abused       Cognitive Screening    Has your family/caregiver stated any concerns about your memory: no     Cognitive Screening: Normal - MMSE (Mini Mental Status Exam)    Health Maintenance Due     Health Maintenance Due   Topic Date Due    Breast Cancer Screen Mammogram  Never done    Colorectal Cancer Screening Combo  Never done    Shingrix Vaccine Age 50> (1 of 2) Never done    Flu Vaccine (1) 09/01/2021       Patient Care Team   Patient Care Team:  Kala Mancilla NP as PCP - General (Nurse Practitioner)    History     Patient Active Problem List   Diagnosis Code    Chronic fatigue R53.82    Chronic pain syndrome G89.4    Depression with anxiety F41.8    Abnormal weight loss R63.4    Acquired hypothyroidism E03.9    Gastroesophageal reflux disease without esophagitis K21.9    Tobacco use Z72.0    Marijuana use F12.90    Nocturnal hypoxemia G47.34    Opiate dependence (HCC) F11.20    Benzodiazepine dependence (HCC) F13.20    Other emphysema (HCC) J43.8    Vitamin B12 deficiency E53.8    Vitamin D deficiency E55.9    Spondylisthesis M43.10    Lumbar stenosis with neurogenic claudication M48.062     Past Medical History:   Diagnosis Date    Cataract     Chronic pain of right groin     after multiple complications from bladder sling that became imbedded    GERD (gastroesophageal reflux disease)     HTN (hypertension)     Infiltrating ductal carcinoma of overlapping sites of both breasts in female [facility])     Lumbar stenosis with neurogenic claudication 10/15/2021    Spondylisthesis 10/15/2021    Thyroid disease     UTI (urinary tract infection)       Past Surgical History:   Procedure Laterality Date    HX BLADDER SUSPENSION      Removed     HX CATARACT REMOVAL Bilateral     HX HYSTERECTOMY      HX LYMPHADENECTOMY      HX TONSILLECTOMY      OR BIOPSY OF BREAST, INCISIONAL Bilateral      Current Outpatient Medications   Medication Sig Dispense Refill    lisinopriL (PRINIVIL, ZESTRIL) 20 mg tablet TAKE 0.5 TABS BY MOUTH DAILY. INDICATIONS: HIGH BLOOD PRESSURE 45 Tablet 3    traZODone (DESYREL) 50 mg tablet Take 1 Tablet by mouth nightly. Indications: insomnia 30 Tablet 2    levothyroxine (SYNTHROID) 25 mcg tablet Take 1 Tablet by mouth Daily (before breakfast). Indications: a condition with low thyroid hormone levels 90 Tablet 0    melatonin 5 mg tablet Take  by mouth nightly. Takes 2 at bedtime      levothyroxine (SYNTHROID) 150 mcg tablet Take 1 Tab by mouth Daily (before breakfast). (Patient not taking: Reported on 7/27/2021) 90 Tab 1    levothyroxine (SYNTHROID) 25 mcg tablet Take 1 Tab by mouth Daily (before breakfast).  To be added to the 125 mcg until she has completed those tablets for a total of 150 mcg 30 Tab 1    mometasone (NASONEX) 50 mcg/actuation nasal spray 2 Sprays by Both Nostrils route daily. (Patient not taking: Reported on 7/27/2021) 3 Container 3    levothyroxine (SYNTHROID) 125 mcg tablet Take 1 Tab by mouth Daily (before breakfast). 90 Tab 3    clonazePAM (KlonoPIN) 0.5 mg tablet Take 1.5 mg by mouth nightly. Per patient (Patient not taking: Reported on 7/27/2021)      melatonin 1 mg tablet Take 1 mg by mouth nightly. (Patient not taking: Reported on 7/27/2021)      ibuprofen (MOTRIN) 800 mg tablet Take 800 mg by mouth as needed for Pain. (Patient not taking: Reported on 7/27/2021)      aspirin 81 mg chewable tablet Take 81 mg by mouth daily.        Allergies   Allergen Reactions    Macrobid [Nitrofurantoin Monohyd/M-Cryst] Other (comments)    Sulfa (Sulfonamide Antibiotics) Other (comments)       Family History   Problem Relation Age of Onset    MS Sister      Social History     Tobacco Use    Smoking status: Current Every Day Smoker     Packs/day: 1.00     Types: Cigarettes    Smokeless tobacco: Never Used   Substance Use Topics    Alcohol use: Yes     Comment: rarely         Margarette WOODWARD

## 2021-10-18 NOTE — PROGRESS NOTES
Vaccine updated. Pneumonia 23  shot given Lot #[id number] Exp 09- right deltoid  Denies former reactions to shot and any allergies to chicken eggs or products. 1. Have you been to the ER, urgent care clinic since your last visit? Hospitalized since your last visit? yes    2. Have you seen or consulted any other health care providers outside of the [address] [place] [address] since your last visit? Include any pap smears or colon screening.   Yes ortho surgeon Dr Salma Wong   Patient presents with    Annual Wellness Visit    Pre-op Exam     spine 11- [facility]         Visit Vitals  BP (!) 160/90 (BP 1 Location: Right upper arm, BP Patient Position: Sitting, BP Cuff Size: Adult)   Pulse 68   Temp 97.2 °F (36.2 °C) (Temporal)   Resp 16   Ht 5' 7\" (1.702 m)   Wt 162 lb (73.5 kg)   SpO2 97%   BMI 25.37 kg/m²       Pain Scale: 10 - Worst pain ever/10  Pain Location: Back

## 2021-10-18 NOTE — PATIENT INSTRUCTIONS

## 2021-10-19 DIAGNOSIS — E03.9 ACQUIRED HYPOTHYROIDISM: Primary | ICD-10-CM

## 2021-10-19 LAB
ALBUMIN SERPL-MCNC: 3.8 G/DL (ref 3.5–5)
ALBUMIN/GLOB SERPL: 1 {RATIO} (ref 1.1–2.2)
ALP SERPL-CCNC: 70 U/L (ref 45–117)
ALT SERPL-CCNC: 12 U/L (ref 12–78)
ANION GAP SERPL CALC-SCNC: 3 MMOL/L (ref 5–15)
APPEARANCE UR: CLEAR
AST SERPL-CCNC: 10 U/L (ref 15–37)
BACTERIA URNS QL MICRO: NEGATIVE /HPF
BASOPHILS # BLD: 0.1 K/UL (ref 0–0.1)
BASOPHILS NFR BLD: 1 % (ref 0–1)
BILIRUB SERPL-MCNC: 0.4 MG/DL (ref 0.2–1)
BILIRUB UR QL: NEGATIVE
BUN SERPL-MCNC: 12 MG/DL (ref 6–20)
BUN/CREAT SERPL: 18 (ref 12–20)
CALCIUM SERPL-MCNC: 9.6 MG/DL (ref 8.5–10.1)
CHLORIDE SERPL-SCNC: 106 MMOL/L (ref 97–108)
CHOLEST SERPL-MCNC: 201 MG/DL
CO2 SERPL-SCNC: 29 MMOL/L (ref 21–32)
COLOR UR: NORMAL
CREAT SERPL-MCNC: 0.66 MG/DL (ref 0.55–1.02)
DIFFERENTIAL METHOD BLD: ABNORMAL
EOSINOPHIL # BLD: 0.1 K/UL (ref 0–0.4)
EOSINOPHIL NFR BLD: 1 % (ref 0–7)
EPITH CASTS URNS QL MICRO: NORMAL /LPF
ERYTHROCYTE [DISTWIDTH] IN BLOOD BY AUTOMATED COUNT: 12.6 % (ref 11.5–14.5)
EST. AVERAGE GLUCOSE BLD GHB EST-MCNC: 91 MG/DL
GLOBULIN SER CALC-MCNC: 3.8 G/DL (ref 2–4)
GLUCOSE SERPL-MCNC: 90 MG/DL (ref 65–100)
GLUCOSE UR STRIP.AUTO-MCNC: NEGATIVE MG/DL
HBA1C MFR BLD: 4.8 % (ref 4–5.6)
HCT VFR BLD AUTO: 45.1 % (ref 35–47)
HDLC SERPL-MCNC: 76 MG/DL
HDLC SERPL: 2.6 {RATIO} (ref 0–5)
HGB BLD-MCNC: 15 G/DL (ref 11.5–16)
HGB UR QL STRIP: NEGATIVE
HYALINE CASTS URNS QL MICRO: NORMAL /LPF (ref 0–5)
IMM GRANULOCYTES # BLD AUTO: 0 K/UL (ref 0–0.04)
IMM GRANULOCYTES NFR BLD AUTO: 0 % (ref 0–0.5)
KETONES UR QL STRIP.AUTO: NEGATIVE MG/DL
LDLC SERPL CALC-MCNC: 102.6 MG/DL (ref 0–100)
LEUKOCYTE ESTERASE UR QL STRIP.AUTO: NEGATIVE
LYMPHOCYTES # BLD: 2.8 K/UL (ref 0.8–3.5)
LYMPHOCYTES NFR BLD: 28 % (ref 12–49)
MCH RBC QN AUTO: 33.3 PG (ref 26–34)
MCHC RBC AUTO-ENTMCNC: 33.3 G/DL (ref 30–36.5)
MCV RBC AUTO: 100 FL (ref 80–99)
MONOCYTES # BLD: 0.7 K/UL (ref 0–1)
MONOCYTES NFR BLD: 7 % (ref 5–13)
NEUTS SEG # BLD: 6.5 K/UL (ref 1.8–8)
NEUTS SEG NFR BLD: 63 % (ref 32–75)
NITRITE UR QL STRIP.AUTO: NEGATIVE
NRBC # BLD: 0 K/UL (ref 0–0.01)
NRBC BLD-RTO: 0 PER 100 WBC
PH UR STRIP: 6.5 [PH] (ref 5–8)
PLATELET # BLD AUTO: 243 K/UL (ref 150–400)
PMV BLD AUTO: 10.5 FL (ref 8.9–12.9)
POTASSIUM SERPL-SCNC: 4.3 MMOL/L (ref 3.5–5.1)
PROT SERPL-MCNC: 7.6 G/DL (ref 6.4–8.2)
PROT UR STRIP-MCNC: NEGATIVE MG/DL
RBC # BLD AUTO: 4.51 M/UL (ref 3.8–5.2)
RBC #/AREA URNS HPF: NORMAL /HPF (ref 0–5)
SODIUM SERPL-SCNC: 138 MMOL/L (ref 136–145)
SP GR UR REFRACTOMETRY: 1.01 (ref 1–1.03)
TRIGL SERPL-MCNC: 112 MG/DL (ref ?–150)
TSH SERPL DL<=0.05 MIU/L-ACNC: 4.61 UIU/ML (ref 0.36–3.74)
UA: UC IF INDICATED,UAUC: NORMAL
UROBILINOGEN UR QL STRIP.AUTO: 0.2 EU/DL (ref 0.2–1)
VLDLC SERPL CALC-MCNC: 22.4 MG/DL
WBC # BLD AUTO: 10.2 K/UL (ref 3.6–11)
WBC URNS QL MICRO: NORMAL /HPF (ref 0–4)

## 2021-10-19 RX ORDER — LEVOTHYROXINE SODIUM 175 UG/1
175 TABLET ORAL
Qty: 90 TABLET | Refills: 3 | Status: SHIPPED | OUTPATIENT
Start: 2021-10-19 | End: 2022-11-01 | Stop reason: SDUPTHER

## 2021-10-19 NOTE — PROGRESS NOTES
Patient verified by stating name and date of birth.  Patient informed of lab results and states understanding per Sapphire Calabrese

## 2021-10-19 NOTE — PROGRESS NOTES
Thyroid level  TSH improved still increased . Increase to 175 mcg . Please ask her to schedule a repeat TSH in 8 weeks. All othe labs including HGBA1C are within normal limits.

## 2021-10-20 ENCOUNTER — TRANSCRIBE ORDER (OUTPATIENT)
Dept: REGISTRATION | Age: 66
End: 2021-10-20

## 2021-10-20 DIAGNOSIS — Z01.812 PRE-PROCEDURE LAB EXAM: Primary | ICD-10-CM

## 2021-10-23 VITALS
TEMPERATURE: 97.2 F | SYSTOLIC BLOOD PRESSURE: 130 MMHG | WEIGHT: 162 LBS | BODY MASS INDEX: 25.43 KG/M2 | HEIGHT: 67 IN | HEART RATE: 68 BPM | OXYGEN SATURATION: 97 % | RESPIRATION RATE: 16 BRPM | DIASTOLIC BLOOD PRESSURE: 80 MMHG

## 2021-10-23 NOTE — PROGRESS NOTES
Preoperative Evaluation    Date of Exam: 10/18/2021    Cande Gardner is a 74 y.o. female ([id number]:[id number]) who presents for preoperative evaluation. Latex Allergy: no    Problem List:     Patient Active Problem List    Diagnosis Date Noted    Spondylisthesis 10/15/2021    Lumbar stenosis with neurogenic claudication 10/15/2021    Other emphysema ([address] 08/24/2020    Vitamin B12 deficiency 08/24/2020    Vitamin D deficiency 08/24/2020    Nocturnal hypoxemia 08/05/2020    Opiate dependence ([address] 08/05/2020    Benzodiazepine dependence ([address] 08/05/2020    Abnormal weight loss 07/21/2020    Acquired hypothyroidism 07/21/2020    Gastroesophageal reflux disease without esophagitis 07/21/2020    Tobacco use 07/21/2020    Marijuana use 07/21/2020    Chronic fatigue 07/20/2020    Chronic pain syndrome 07/20/2020    Depression with anxiety 07/20/2020     Medical History:     Past Medical History:   Diagnosis Date    Cataract     Chronic pain of right groin     after multiple complications from bladder sling that became imbedded    GERD (gastroesophageal reflux disease)     HTN (hypertension)     Infiltrating ductal carcinoma of overlapping sites of both breasts in [facility])     Lumbar stenosis with neurogenic claudication 10/15/2021    Spondylisthesis 10/15/2021    Thyroid disease     UTI (urinary tract infection)      Allergies: Allergies   Allergen Reactions    Macrobid [Nitrofurantoin Monohyd/M-Cryst] Other (comments)    Sulfa (Sulfonamide Antibiotics) Other (comments)      Medications:     Current Outpatient Medications   Medication Sig    OTHER [facility]    lisinopriL (PRINIVIL, ZESTRIL) 20 mg tablet Take 1 Tablet by mouth daily. Indications: high blood pressure    hydroCHLOROthiazide (HYDRODIURIL) 12.5 mg tablet Take 1 Tablet by mouth daily. Indications: high blood pressure    melatonin 5 mg tablet Take  by mouth nightly.  Takes 2 at bedtime    ibuprofen (MOTRIN) 800 mg tablet Take 800 mg by mouth as needed for Pain.  aspirin 81 mg chewable tablet Take 81 mg by mouth daily.  levothyroxine (SYNTHROID) 175 mcg tablet Take 1 Tablet by mouth Daily (before breakfast).  traZODone (DESYREL) 50 mg tablet Take 1 Tablet by mouth nightly. Indications: insomnia (Patient not taking: Reported on 10/18/2021)    mometasone (NASONEX) 50 mcg/actuation nasal spray 2 Sprays by Both Nostrils route daily. (Patient not taking: Reported on 7/27/2021)    clonazePAM (KlonoPIN) 0.5 mg tablet Take 1.5 mg by mouth nightly. Per patient (Patient not taking: Reported on 7/27/2021)    melatonin 1 mg tablet Take 1 mg by mouth nightly. (Patient not taking: Reported on 10/18/2021)     No current facility-administered medications for this visit.      Surgical History:     Past Surgical History:   Procedure Laterality Date    HX BLADDER SUSPENSION      Removed     HX CATARACT REMOVAL Bilateral     HX HYSTERECTOMY      HX LYMPHADENECTOMY      HX TONSILLECTOMY      MS BIOPSY OF BREAST, INCISIONAL Bilateral      Social History:     Social History     Socioeconomic History    Marital status:      Spouse name: Not on file    Number of children: Not on file    Years of education: Not on file    Highest education level: Not on file   Tobacco Use    Smoking status: Current Every Day Smoker     Packs/day: 1.00     Types: Cigarettes    Smokeless tobacco: Never Used   Vaping Use    Vaping Use: Former   Substance and Sexual Activity    Alcohol use: Yes     Comment: rarely    Drug use: Yes     Types: Marijuana, Prescription     Comment: nightly(pain relief)    Sexual activity: Not Currently   Other Topics Concern     Service No    Blood Transfusions No    Caffeine Concern No    Occupational Exposure No    Hobby Hazards No    Sleep Concern No    Stress Concern No    Weight Concern Yes    Special Diet No    Back Care Yes    Exercise Yes    Bike Helmet No    Seat Belt Yes    Self-Exams Yes     Social Determinants of Health     Financial Resource Strain:     Difficulty of Paying Living Expenses:    Food Insecurity:     Worried About Running Out of Food in the Last Year:     [address] in the Last Year:    Transportation Needs:     Lack of Transportation (Medical):  Lack of Transportation (Non-Medical):    Physical Activity:     Days of Exercise per Week:     Minutes of Exercise per Session:    Stress:     Feeling of Stress :    Social Connections:     Frequency of Communication with Friends and Family:     Frequency of Social Gatherings with Friends and Family:     Attends Jewish Services:     Active Member of Clubs or Organizations:     Attends Club or Organization Meetings:     Marital Status:        Anesthesia Complications: None  History of abnormal bleeding : None  History of Blood Transfusions: no  Health Care Directive or Living Will: no    Objective:     ROS:   Feeling well. No dyspnea or chest pain on exertion. No abdominal pain, change in bowel habits, black or bloody stools. No urinary tract symptoms. GYN ROS: menopause no abnormal bleeding, pelvic pain or discharge, no breast pain or new or enlarging lumps on self exam. No neurological complaints. OBJECTIVE:   The patient appears well, alert, oriented x 3, in no distress. Visit Vitals  /80 (BP 1 Location: Left upper arm, BP Patient Position: Sitting, BP Cuff Size: Adult)   Pulse 68   Temp 97.2 °F (36.2 °C) (Temporal)   Resp 16   Ht 5' 7\" (1.702 m)   Wt 162 lb (73.5 kg)   SpO2 97%   BMI 25.37 kg/m²     HEENT:ENT normal.  Neck supple. No adenopathy or thyromegaly. TATA. Chest: Lungs are clear, good air entry, no wheezes, rhonchi or rales. Cardiovascular: S1 and S2 normal, no murmurs, regular rate and rhythm. Abdomen: soft without tenderness, guarding, mass or organomegaly. Extremities: show no edema, normal peripheral pulses. Neurological: is normal, no focal findings.     BREAST EXAM: examination not indicated   PELVIC EXAM: examination not indicated      DIAGNOSTICS:   1. EKG: EKG FINDINGS - Sinus rhythm HR 69 no EKG for comparison. 2. CXR: pending  3. Labs:   Lab Results   Component Value Date/Time    WBC 10.2 10/18/2021 02:38 PM    HGB 15.0 10/18/2021 02:38 PM    HCT 45.1 10/18/2021 02:38 PM    PLATELET 097 31/74/5249 02:38 PM    .0 (H) 10/18/2021 02:38 PM     Lab Results   Component Value Date/Time    Sodium 138 10/18/2021 02:38 PM    Potassium 4.3 10/18/2021 02:38 PM    Chloride 106 10/18/2021 02:38 PM    CO2 29 10/18/2021 02:38 PM    Anion gap 3 (L) 10/18/2021 02:38 PM    Glucose 90 10/18/2021 02:38 PM    BUN 12 10/18/2021 02:38 PM    Creatinine 0.66 10/18/2021 02:38 PM    BUN/Creatinine ratio 18 10/18/2021 02:38 PM    GFR est AA >60 10/18/2021 02:38 PM    GFR est non-AA >60 10/18/2021 02:38 PM    Calcium 9.6 10/18/2021 02:38 PM    Bilirubin, total 0.4 10/18/2021 02:38 PM    ALT (SGPT) 12 10/18/2021 02:38 PM    Alk.  phosphatase 70 10/18/2021 02:38 PM    Protein, total 7.6 10/18/2021 02:38 PM    Albumin 3.8 10/18/2021 02:38 PM    Globulin 3.8 10/18/2021 02:38 PM    A-G Ratio 1.0 (L) 10/18/2021 02:38 PM      Lab Results   Component Value Date/Time    Hemoglobin A1c 4.8 10/18/2021 02:38 PM        IMPRESSION:   Low risk for planned surgery pending CXR  No contraindications to planned surgery      Edwina Camarillo NP   10/18/2021

## 2021-10-25 ENCOUNTER — HOSPITAL ENCOUNTER (OUTPATIENT)
Dept: GENERAL RADIOLOGY | Age: 66
Discharge: HOME | End: 2021-10-25
Payer: MEDICARE

## 2021-10-25 DIAGNOSIS — Z01.818 PREOP EXAMINATION: ICD-10-CM

## 2021-10-25 PROCEDURE — 71046 X-RAY EXAM CHEST 2 VIEWS: CPT

## 2021-10-27 ENCOUNTER — HOSPITAL ENCOUNTER (OUTPATIENT)
Dept: PREADMISSION TESTING | Age: 66
Discharge: HOME | End: 2021-10-27
Payer: MEDICARE

## 2021-10-27 VITALS
BODY MASS INDEX: 25.19 KG/M2 | WEIGHT: 160.5 LBS | DIASTOLIC BLOOD PRESSURE: 88 MMHG | HEIGHT: 67 IN | HEART RATE: 63 BPM | TEMPERATURE: 98.4 F | SYSTOLIC BLOOD PRESSURE: 144 MMHG

## 2021-10-27 DIAGNOSIS — Z01.812 PRE-PROCEDURE LAB EXAM: ICD-10-CM

## 2021-10-27 LAB
ABO + RH BLD: NORMAL
BLOOD GROUP ANTIBODIES SERPL: NORMAL
INR PPP: 1 (ref 0.9–1.1)
PROTHROMBIN TIME: 10.4 SEC (ref 9–11.1)
SPECIMEN EXP DATE BLD: NORMAL

## 2021-10-27 PROCEDURE — 36415 COLL VENOUS BLD VENIPUNCTURE: CPT

## 2021-10-27 PROCEDURE — U0003 INFECTIOUS AGENT DETECTION BY NUCLEIC ACID (DNA OR RNA); SEVERE ACUTE RESPIRATORY SYNDROME CORONAVIRUS 2 (SARS-COV-2) (CORONAVIRUS DISEASE [COVID-19]), AMPLIFIED PROBE TECHNIQUE, MAKING USE OF HIGH THROUGHPUT TECHNOLOGIES AS DESCRIBED BY CMS-2020-01-R: HCPCS

## 2021-10-27 PROCEDURE — 85610 PROTHROMBIN TIME: CPT

## 2021-10-27 PROCEDURE — 86901 BLOOD TYPING SEROLOGIC RH(D): CPT

## 2021-10-27 RX ORDER — BISMUTH SUBSALICYLATE 262 MG
1 TABLET,CHEWABLE ORAL DAILY
COMMUNITY

## 2021-10-27 RX ORDER — MELATONIN
DAILY
COMMUNITY

## 2021-10-27 RX ORDER — MULTIVIT WITH MINERALS/HERBS
1 TABLET ORAL DAILY
COMMUNITY

## 2021-10-27 NOTE — PERIOP NOTES
PREOPERATIVE INSTRUCTIONS REVIEWED WITH PATIENT. PATIENT GIVEN TWO--BOTTLES OF CHG SOAPS  INSTRUCTIONS REVIEWED ON USE OF CHG SOAPS  PATIENT GIVEN SSI INFECTIONS SHEET; MRSA/MSSA TREATMENT INSTRUCTION SHEET GIVEN WITH AN EXPLANATION TO PATIENT THAT THEY WILL BE NOTIFIED IF TREATMENT INSTRUCTIONS NEED TO BE INITIATED. PATIENT WAS GIVEN THE OPPORTUNITY TO ASK QUESTIONS; REGARDING THE INFORMATION PROVIDED. PREOP DIET AND NUTRITION UPDATED GUIDELINES/ INSTRUCTIONS PROVIDED     PATIENT ATTENDED PREOP  SPINE  CLASS TODAY. FULLY VACCINATED ,DID NOT BRING THE [employer] VACCINATION CARD TODAY FOR PAT, INSTRUCTED TO BRING ON DAY OF SURGERY      PAT Nutrition Screen    1. Has the patient had an unplanned weight loss of 10% of body weight or more in the last 6 months? No = 0   2. Has the patient been eating less than 50% of their normal diet in the preceding week? Yes = 1       Patient instructed on Non-Diabetic pre-operative nutrition plan to start 5 days prior to surgery. Patient given 10 shakes and 3 pre-surgery drinks. Opportunity given for questions and all questions answered.

## 2021-10-28 LAB
BACTERIA SPEC CULT: NORMAL
BACTERIA SPEC CULT: NORMAL
SARS-COV-2, XPLCVT: NOT DETECTED
SERVICE CMNT-IMP: NORMAL
SOURCE, COVRS: NORMAL

## 2021-10-28 RX ORDER — ALBUTEROL SULFATE 90 UG/1
2 AEROSOL, METERED RESPIRATORY (INHALATION)
Qty: 18 G | Refills: 2 | Status: SHIPPED | OUTPATIENT
Start: 2021-10-28 | End: 2022-11-01

## 2021-10-28 NOTE — PROGRESS NOTES
Patient verified stating name and date of birth. Patient informed of  chest  x-ray results. She is interested  in a RX for breathing like an inhaler due to feeling tired. Also interested in help with smoking. She states she has patches DR nieves had ordered in the past but never used and sd check with pharmacy to see if they are still in date.  She is also interested in a pulmonologist referral if you think she needs this based on her chest x-ray

## 2021-10-28 NOTE — H&P
Emmy Rangel  Location: - [facility] OFFICE  Patient #: [id number]  : 1955   / Language: English / Race: White  Female      History of Present Illness   The patient is a 74year old female who presents with a complaint of Low Back Pain. Note for \"Low Back Pain\": She presents today with a chronic history of back pain and bilateral leg pain.  She also has neurogenic claudication.  He been having this pain for the last year.  This started with some recent anterior abdominal surgery.  Since she has recovered from that she had increasing pain in the back and bilateral legs.  She has an MRI showing spondylolisthesis at L4-5 as well as stenosis.  She is tried injections without long-term relief.  She denies any bowel bladder difficulties.  Pain is worse with standing and walking.  It does improve with sitting. Allergies   Sulfa Drugs    Macrobid *ANTI-INFECTIVE AGENTS - MISC. *      Family History   Alcohol Abuse   Father. Breast cancer   Mother, Sister. Cancer   Sister. Depression   Daughter. Heart Disease   Mother, Sister. Heart disease in female family member before age 72    Thyroid problems   Brother. Social History   Alcohol use   1-2 drinks per occasion, 4 times, Drinks wine, Never drinks more than 5 drinks per occasion, per week. Current work status   Retired. Exercise   walking. Illicit drug use   Prefer to discuss with provider. Marital status   . Seat Belt Use   Always uses seat belts. Sun Exposure   Rarely. Tobacco / smoke exposure   None. Tobacco use   Current every day smoker, Smokes 1 pack of cigarettes per day. Medication History   Lisinopril  (20MG Tablet, Oral) Active. Levothyroxine Sodium  (150MCG Tablet, Oral) Active. Aspirin  (81MG Tablet, Oral) Active. Vitamin B Complex  (Oral) Active. Vitamin D3  (25 MCG(1000 UT) Tablet, Oral) Active. Multivitamin  (Oral) Active.   Medications Reconciled     Pregnancy / Birth History   Pregnant   No.    Past Surgical History   Anal Fissure Repair    Breast Biopsy   left  Breast Mass; Local Excision   right  Cataract Surgery   bilateral  Hysterectomy   non-cancerous: complete and vaginal  Tonsillectomy      Diagnostic Studies History  Lumbar Spine X-ray   Date: 9/2/2021, Results: Lumbar x-rays reviewed today. She has a grade 1 anterolisthesis at L4-5. It increases to 6 mm with flexion. Reduces with extension. No fracture or lytic lesions. MRI, Spine   Lumbar @[facility], Date: 9/02/2021, Results: I reviewed the MRI of the lumbar spine. There is spinal stenosis at L4-5 due to facet hypertrophy and ligamentous hypertrophy. Facet widening is noted as well as anterolisthesis. Other Problems   Allergic Urticaria    Anxiety Disorder    Bladder Problems    Breast Cancer    Depression    Gastroesophageal Reflux Disease    High blood pressure    Oophorectomy   bilateral  Thyroid Disease          Review of Systems   General Present- Fatigue and Night Sweats. Not Present- Appetite Loss, Chills, Fever, HIV Exposure, Persistent Infections, Seasonal Allergies, Weight Gain and Weight Loss. HEENT Not Present- Decreased Hearing, Double Vision, Earache, Hoarseness, Jaundice/Yellow Eyes, Loose Teeth, Nose Bleed, Ringing in the Ears and Sore Throat. Respiratory Not Present- Bloody sputum, Chronic Cough, Difficulty Breathing, Snoring, Wakes up from Sleep Wheezing or Short of Breath and Wheezing. Cardiovascular Not Present- Bluish Discoloration Of Lips Or Nails, Chest Pain, Difficulty Breathing Lying Down, Difficulty Breathing On Exertion, Leg Cramps With Exertion, Palpitations and Swelling of Extremities. Gastrointestinal Not Present- Abdominal Pain, Black, Tarry Stool, Change in Bowel Habits, Cirrhosis, Constipation, Diarrhea, Difficulty Swallowing, Nausea and Vomiting. Musculoskeletal Not Present- Back Pain, Joint Pain, Joint Stiffness and Joint Swelling. Neurological Present- Numbness, Tingling and Weakness. Not Present- Fainting, Headaches, Memory Loss, Seizures, Tremor and Unsteadiness. Endocrine Present- Excessive Urination. Not Present- Cold Intolerance, Excessive Hunger, Excessive Thirst and Heat Intolerance. Hematology Not Present- Abnormal Bruising , Enlarged Lymph Nodes, Excessive bleeding and Skin Discoloration. Vitals   9/2/2021 4:58 PM  Weight: 159 lb   Height: 66 in   Weight was reported by patient. Height was reported by patient. Body Surface Area: 1.81 m²   Body Mass Index: 25.66 kg/m²                Physical Exam   Neurologic  Sensory  Light Touch - Intact - Globally. Overall Assessment of Muscle Strength and Tone reveals  Lower Extremities - Right Iliopsoas - 5/5. Left Iliopsoas - 5/5. Right Tibialis Anterior - 5/5. Left Tibialis Anterior - 5/5. Right Gastroc-Soleus - 5/5. Left Gastroc-Soleus - 5/5. Right EHL - 4-/5. Left EHL - 4-/5. General Assessment of Reflexes  Right Ankle - Clonus is not present. Left Ankle - Clonus is not present. Reflexes (Dermatomes)  2/2 Normal - Left Achilles (L5-S2), Left Knee (L2-4), Right Achilles (L5-S2) and Right Knee (L2-4). Musculoskeletal  Global Assessment  Examination of related systems reveals - well-developed, well-nourished, in no acute distress, alert and oriented x 3. Gait and Station - normal gait and station and normal posture. Right Lower Extremity - normal strength and tone, normal range of motion without pain and no instability, subluxation or laxity. Left Lower Extremity - normal strength and tone, normal range of motion without pain and no instability, subluxation or laxity. Spine/Ribs/Pelvis  Cervical Spine - Examination of the cervical spine reveals - no tenderness to palpation, no pain, no swelling, edema or erythema, normal cervical spine movements and normal sensation. Thoracic (Dorsal) Spine - Examination of the thoracic spine reveals - no tenderness over thoracic vertebrae, no pain, normal sensation and normal thoracic spine movements. Lumbosacral Spine - Examination of the lumbosacral spine reveals - no known fractures or deformities. Inspection and Palpation - Tenderness - moderate. Assessment of pain reveals the following findings - The pain is characterized as - moderate. Location - pain refers to lower back bilaterally. ROJM - Trunk Extension - 15 degrees. Lumbar Spine Flexion - 35 °. Lumbosacral Spine - Functional Testing - Babinski Test negative, Prone Knee Bending Test negative, Slump Test negative, Straight Leg Raising Test negative. Assessment & Plan     Spondylolisthesis (Acquired) (738.4  M43.10)  Impression: The patient is with back pain and bilateral leg pain. She has severe neurogenic claudication. She has spinal stenosis focally at L4-5. I think she is a good candidate for a lumbar laminectomy at L4-5 with a midline fusion at L4-5. Risk and benefits were discussed with her. The risks and benefits were discussed at length with the patient and the patient has elected to proceed. Indications for surgery include failed conservative treatment. Alternative treatments, risks and the perioperative course were discussed with the patient. All questions were answered. The risks and benefits of the procedure were explained. Benefits include definitive diagnosis, relief of pain, elimination of deformity and improved function. Risks of surgery including bleeding, infection, weakness, numbness, CSF leak, failure to improve symptoms, exacerbation of medical co-morbidities and even death were discussed with the patient. This medical document was created using voice recognition software. Although this document has been reviewed, there may be some phonetic and other typographical errors. These typographical errors are purely grammatical, due to the imperfections in the software program and do not reflect any compromise in the patient's medical care.     Current Plans  X-RAY EXAM OF LUMBAR SPINE, 4 OR MORE VIEWS (07929) (AP, Lateral, Flexion and Extension views were taken today using Digital Radiography.)  Pt Education - How to Access Health Information Online using Patient Portal and 3rd Party Apps: discussed with patient and provided information. Pt Education - Educational materials were provided.: discussed with patient and provided information. Lumbar stenosis with neurogenic claudication (724.03  M48.062)      Treatment options were discussed with the patient in full.(V65.49)    Current Plans  Presurgical planning was preformed with the patient today  Surgery to be scheduled  Procedure: L4-5 laminectomy and midline fusion. Date of Surgery Update: Isaac Morse was seen and examined. History and physical has been reviewed. The patient has been examined.  There have been no significant clinical changes since the completion of the originally dated History and Physical.    Signed By: Nohemi Olivas MD     November 2, 2021 11:47 AM             Signed by Nohemi Olivas MD

## 2021-10-28 NOTE — PROGRESS NOTES
Preop CXR   Minimal low density on left mid lower lung. Mld hyperaeration leaning towards COPD, Cleared for surgery no restrictions.

## 2021-10-28 NOTE — PERIOP NOTES
Preoperative Nutrition Screen (FITO)   Patient's Age: 74 y.o. Patient's BMI: Estimated body mass index is 25.52 kg/m² as calculated from the following:    Height as of this encounter: 5' 6.5\" (1.689 m). Weight as of this encounter: 72.8 kg (160 lb 7.9 oz). If the answer to any of the following is Yes, then recommend prescribe Oral Nutrition Supplements (ONS) for at least 7 days prior to surgery and/or order referral to dietitian for further assessment and nutrition therapy. 1. Does the patient have a documented serum albumin less than 3.0 within the last 90 days? No = 0          2. Is patient's BMI less than 18.5 (or less than 20 if age over 72)? No = 0        3. Has the patient had an unplanned weight loss of 10% of body weight or more in the last 6 months? No = 0   4. Has the patient been eating less than 50% of their normal diet in the preceding week? Yes = 1   FITO Score (number of Yes responses), 0-4 1     Plan:   Patient was educated on the following topics   2 immuno nutrition shakes daily, 5 days prior to surgery and 5 days post-operatively. 3 pre-surgery drinks.     Electronically signed by Kelly Paredes NP on 10/28/21 at 11:15 AM

## 2021-11-01 ENCOUNTER — ANESTHESIA EVENT (OUTPATIENT)
Dept: SURGERY | Age: 66
DRG: 455 | End: 2021-11-01
Payer: MEDICARE

## 2021-11-02 ENCOUNTER — APPOINTMENT (OUTPATIENT)
Dept: GENERAL RADIOLOGY | Age: 66
DRG: 455 | End: 2021-11-02
Attending: ORTHOPAEDIC SURGERY
Payer: MEDICARE

## 2021-11-02 ENCOUNTER — ANESTHESIA (OUTPATIENT)
Dept: SURGERY | Age: 66
DRG: 455 | End: 2021-11-02
Payer: MEDICARE

## 2021-11-02 ENCOUNTER — HOSPITAL ENCOUNTER (INPATIENT)
Age: 66
LOS: 2 days | Discharge: HOME | DRG: 455 | End: 2021-11-04
Attending: ORTHOPAEDIC SURGERY | Admitting: ORTHOPAEDIC SURGERY
Payer: MEDICARE

## 2021-11-02 DIAGNOSIS — Z98.1 S/P LUMBAR FUSION: Primary | ICD-10-CM

## 2021-11-02 PROBLEM — M48.061 LUMBAR STENOSIS: Status: ACTIVE | Noted: 2021-11-02

## 2021-11-02 LAB
GLUCOSE BLD STRIP.AUTO-MCNC: 75 MG/DL (ref 65–117)
HGB BLD-MCNC: 14 G/DL (ref 11.5–16)
SERVICE CMNT-IMP: NORMAL

## 2021-11-02 PROCEDURE — 8E0W0CZ ROBOTIC ASSISTED PROCEDURE OF TRUNK REGION, OPEN APPROACH: ICD-10-PCS | Performed by: ORTHOPAEDIC SURGERY

## 2021-11-02 PROCEDURE — 77030038552 HC DRN WND MDII -A: Performed by: ORTHOPAEDIC SURGERY

## 2021-11-02 PROCEDURE — 74011250636 HC RX REV CODE- 250/636: Performed by: PHYSICIAN ASSISTANT

## 2021-11-02 PROCEDURE — 77030037808 HC GRFT SPNG OSTEOAMP BTUS -H1: Performed by: ORTHOPAEDIC SURGERY

## 2021-11-02 PROCEDURE — 74011000250 HC RX REV CODE- 250: Performed by: STUDENT IN AN ORGANIZED HEALTH CARE EDUCATION/TRAINING PROGRAM

## 2021-11-02 PROCEDURE — 0SG00AJ FUSION OF LUMBAR VERTEBRAL JOINT WITH INTERBODY FUSION DEVICE, POSTERIOR APPROACH, ANTERIOR COLUMN, OPEN APPROACH: ICD-10-PCS | Performed by: ORTHOPAEDIC SURGERY

## 2021-11-02 PROCEDURE — C1713 ANCHOR/SCREW BN/BN,TIS/BN: HCPCS | Performed by: ORTHOPAEDIC SURGERY

## 2021-11-02 PROCEDURE — 77030029099 HC BN WAX SSPC -A: Performed by: ORTHOPAEDIC SURGERY

## 2021-11-02 PROCEDURE — 77030004391 HC BUR FLUT MEDT -C: Performed by: ORTHOPAEDIC SURGERY

## 2021-11-02 PROCEDURE — 76010000172 HC OR TIME 2.5 TO 3 HR INTENSV-TIER 1: Performed by: ORTHOPAEDIC SURGERY

## 2021-11-02 PROCEDURE — 0ST20ZZ RESECTION OF LUMBAR VERTEBRAL DISC, OPEN APPROACH: ICD-10-PCS | Performed by: ORTHOPAEDIC SURGERY

## 2021-11-02 PROCEDURE — 77030019908 HC STETH ESOPH SIMS -A: Performed by: ANESTHESIOLOGY

## 2021-11-02 PROCEDURE — 74011000258 HC RX REV CODE- 258

## 2021-11-02 PROCEDURE — 72100 X-RAY EXAM L-S SPINE 2/3 VWS: CPT

## 2021-11-02 PROCEDURE — 2709999900 HC NON-CHARGEABLE SUPPLY: Performed by: ORTHOPAEDIC SURGERY

## 2021-11-02 PROCEDURE — 74011250636 HC RX REV CODE- 250/636

## 2021-11-02 PROCEDURE — 77030008684 HC TU ET CUF COVD -B: Performed by: ANESTHESIOLOGY

## 2021-11-02 PROCEDURE — 0SG0071 FUSION OF LUMBAR VERTEBRAL JOINT WITH AUTOLOGOUS TISSUE SUBSTITUTE, POSTERIOR APPROACH, POSTERIOR COLUMN, OPEN APPROACH: ICD-10-PCS | Performed by: ORTHOPAEDIC SURGERY

## 2021-11-02 PROCEDURE — 76060000037 HC ANESTHESIA 3 TO 3.5 HR: Performed by: ORTHOPAEDIC SURGERY

## 2021-11-02 PROCEDURE — 85018 HEMOGLOBIN: CPT

## 2021-11-02 PROCEDURE — 74011250636 HC RX REV CODE- 250/636: Performed by: NURSE ANESTHETIST, CERTIFIED REGISTERED

## 2021-11-02 PROCEDURE — 74011250636 HC RX REV CODE- 250/636: Performed by: ANESTHESIOLOGY

## 2021-11-02 PROCEDURE — 77030031139 HC SUT VCRL2 J&J -A: Performed by: ORTHOPAEDIC SURGERY

## 2021-11-02 PROCEDURE — 77030026438 HC STYL ET INTUB CARD -A: Performed by: ANESTHESIOLOGY

## 2021-11-02 PROCEDURE — 77030034475 HC MISC IMPL SPN: Performed by: ORTHOPAEDIC SURGERY

## 2021-11-02 PROCEDURE — 74011000250 HC RX REV CODE- 250: Performed by: PHYSICIAN ASSISTANT

## 2021-11-02 PROCEDURE — 77030038600 HC TU BPLR IRR DISP STRY -B: Performed by: ORTHOPAEDIC SURGERY

## 2021-11-02 PROCEDURE — 77030005513 HC CATH URETH FOL11 MDII -B: Performed by: ORTHOPAEDIC SURGERY

## 2021-11-02 PROCEDURE — 74011250636 HC RX REV CODE- 250/636: Performed by: STUDENT IN AN ORGANIZED HEALTH CARE EDUCATION/TRAINING PROGRAM

## 2021-11-02 PROCEDURE — 77030040914 HC SPHERE PASS MRKR BUSA -B: Performed by: ORTHOPAEDIC SURGERY

## 2021-11-02 PROCEDURE — 76210000006 HC OR PH I REC 0.5 TO 1 HR: Performed by: ORTHOPAEDIC SURGERY

## 2021-11-02 PROCEDURE — 74011000250 HC RX REV CODE- 250: Performed by: NURSE ANESTHETIST, CERTIFIED REGISTERED

## 2021-11-02 PROCEDURE — 82962 GLUCOSE BLOOD TEST: CPT

## 2021-11-02 PROCEDURE — 77030035338 HC BED MT SPN RENSNCE GDNC KT MAZR -G1: Performed by: ORTHOPAEDIC SURGERY

## 2021-11-02 PROCEDURE — 74011000250 HC RX REV CODE- 250: Performed by: ORTHOPAEDIC SURGERY

## 2021-11-02 PROCEDURE — 74011250636 HC RX REV CODE- 250/636: Performed by: ORTHOPAEDIC SURGERY

## 2021-11-02 PROCEDURE — 77030014650 HC SEAL MTRX FLOSEL BAXT -C: Performed by: ORTHOPAEDIC SURGERY

## 2021-11-02 PROCEDURE — 74011250637 HC RX REV CODE- 250/637: Performed by: STUDENT IN AN ORGANIZED HEALTH CARE EDUCATION/TRAINING PROGRAM

## 2021-11-02 PROCEDURE — 01NB3ZZ RELEASE LUMBAR NERVE, PERCUTANEOUS APPROACH: ICD-10-PCS | Performed by: ORTHOPAEDIC SURGERY

## 2021-11-02 PROCEDURE — 65270000029 HC RM PRIVATE

## 2021-11-02 PROCEDURE — 07DR3ZZ EXTRACTION OF ILIAC BONE MARROW, PERCUTANEOUS APPROACH: ICD-10-PCS | Performed by: ORTHOPAEDIC SURGERY

## 2021-11-02 PROCEDURE — 74011250637 HC RX REV CODE- 250/637: Performed by: ANESTHESIOLOGY

## 2021-11-02 PROCEDURE — 77030020268 HC MISC GENERAL SUPPLY: Performed by: ORTHOPAEDIC SURGERY

## 2021-11-02 DEVICE — SET SCREW 5440030 4.75 TI NS BRK OFF
Type: IMPLANTABLE DEVICE | Site: BACK | Status: FUNCTIONAL
Brand: CD HORIZON® SPINAL SYSTEM

## 2021-11-02 DEVICE — GRAFT BONE 10 CC: Type: IMPLANTABLE DEVICE | Site: BACK | Status: FUNCTIONAL

## 2021-11-02 DEVICE — SPACER 6068073 CATALYFT PL SHORT 7MM
Type: IMPLANTABLE DEVICE | Site: BACK | Status: FUNCTIONAL
Brand: CATALYFT PL EXPANDABLE INTERBODY SYSTEM

## 2021-11-02 DEVICE — GRAFT BNE SUB M W20XH7XL30MM COMPRESSIBLE SPNG STRP PROVIDE: Type: IMPLANTABLE DEVICE | Site: BACK | Status: FUNCTIONAL

## 2021-11-02 DEVICE — GRAFT BONE 5 CC: Type: IMPLANTABLE DEVICE | Site: BACK | Status: FUNCTIONAL

## 2021-11-02 DEVICE — SCREW 54840005535 MAS 5.5X35 CC
Type: IMPLANTABLE DEVICE | Site: BACK | Status: FUNCTIONAL
Brand: CD HORIZON® SPINAL SYSTEM

## 2021-11-02 DEVICE — ROD 1475501040 4.75 CCM NS CURV 40MM
Type: IMPLANTABLE DEVICE | Site: BACK | Status: FUNCTIONAL
Brand: CD HORIZON® SPINAL SYSTEM

## 2021-11-02 RX ORDER — DIPHENHYDRAMINE HYDROCHLORIDE 50 MG/ML
12.5 INJECTION, SOLUTION INTRAMUSCULAR; INTRAVENOUS AS NEEDED
Status: DISCONTINUED | OUTPATIENT
Start: 2021-11-02 | End: 2021-11-02 | Stop reason: HOSPADM

## 2021-11-02 RX ORDER — MULTIVIT WITH MINERALS/HERBS
1 TABLET ORAL DAILY
Status: DISCONTINUED | OUTPATIENT
Start: 2021-11-03 | End: 2021-11-04 | Stop reason: HOSPADM

## 2021-11-02 RX ORDER — AMOXICILLIN 250 MG
1 CAPSULE ORAL 2 TIMES DAILY
Status: DISCONTINUED | OUTPATIENT
Start: 2021-11-02 | End: 2021-11-04 | Stop reason: HOSPADM

## 2021-11-02 RX ORDER — OXYCODONE HYDROCHLORIDE 5 MG/1
10 TABLET ORAL
Status: DISCONTINUED | OUTPATIENT
Start: 2021-11-02 | End: 2021-11-03

## 2021-11-02 RX ORDER — VANCOMYCIN HYDROCHLORIDE 1 G/20ML
INJECTION, POWDER, LYOPHILIZED, FOR SOLUTION INTRAVENOUS AS NEEDED
Status: DISCONTINUED | OUTPATIENT
Start: 2021-11-02 | End: 2021-11-02 | Stop reason: HOSPADM

## 2021-11-02 RX ORDER — KETAMINE HYDROCHLORIDE 10 MG/ML
INJECTION, SOLUTION INTRAMUSCULAR; INTRAVENOUS AS NEEDED
Status: DISCONTINUED | OUTPATIENT
Start: 2021-11-02 | End: 2021-11-02 | Stop reason: HOSPADM

## 2021-11-02 RX ORDER — POLYETHYLENE GLYCOL 3350 17 G/17G
17 POWDER, FOR SOLUTION ORAL DAILY
Status: DISCONTINUED | OUTPATIENT
Start: 2021-11-03 | End: 2021-11-04 | Stop reason: HOSPADM

## 2021-11-02 RX ORDER — MIDAZOLAM HYDROCHLORIDE 1 MG/ML
INJECTION, SOLUTION INTRAMUSCULAR; INTRAVENOUS AS NEEDED
Status: DISCONTINUED | OUTPATIENT
Start: 2021-11-02 | End: 2021-11-02 | Stop reason: HOSPADM

## 2021-11-02 RX ORDER — ACETAMINOPHEN 325 MG/1
650 TABLET ORAL ONCE
Status: COMPLETED | OUTPATIENT
Start: 2021-11-02 | End: 2021-11-02

## 2021-11-02 RX ORDER — HYDROMORPHONE HCL/0.9% NACL/PF 0.5 MG/ML
PLASTIC BAG, INJECTION (ML) INTRAVENOUS
Status: DISCONTINUED | OUTPATIENT
Start: 2021-11-02 | End: 2021-11-03

## 2021-11-02 RX ORDER — SODIUM CHLORIDE 0.9 % (FLUSH) 0.9 %
5-40 SYRINGE (ML) INJECTION AS NEEDED
Status: DISCONTINUED | OUTPATIENT
Start: 2021-11-02 | End: 2021-11-02 | Stop reason: HOSPADM

## 2021-11-02 RX ORDER — SODIUM CHLORIDE 0.9 % (FLUSH) 0.9 %
5-40 SYRINGE (ML) INJECTION EVERY 8 HOURS
Status: DISCONTINUED | OUTPATIENT
Start: 2021-11-02 | End: 2021-11-04 | Stop reason: HOSPADM

## 2021-11-02 RX ORDER — SODIUM CHLORIDE 9 MG/ML
25 INJECTION, SOLUTION INTRAVENOUS CONTINUOUS
Status: DISCONTINUED | OUTPATIENT
Start: 2021-11-02 | End: 2021-11-02 | Stop reason: HOSPADM

## 2021-11-02 RX ORDER — MORPHINE SULFATE 2 MG/ML
2 INJECTION, SOLUTION INTRAMUSCULAR; INTRAVENOUS
Status: DISCONTINUED | OUTPATIENT
Start: 2021-11-02 | End: 2021-11-02 | Stop reason: HOSPADM

## 2021-11-02 RX ORDER — LIDOCAINE HYDROCHLORIDE 20 MG/ML
INJECTION, SOLUTION EPIDURAL; INFILTRATION; INTRACAUDAL; PERINEURAL AS NEEDED
Status: DISCONTINUED | OUTPATIENT
Start: 2021-11-02 | End: 2021-11-02 | Stop reason: HOSPADM

## 2021-11-02 RX ORDER — MELATONIN
1000 DAILY
Status: DISCONTINUED | OUTPATIENT
Start: 2021-11-03 | End: 2021-11-04 | Stop reason: HOSPADM

## 2021-11-02 RX ORDER — LIDOCAINE HYDROCHLORIDE 10 MG/ML
0.1 INJECTION, SOLUTION EPIDURAL; INFILTRATION; INTRACAUDAL; PERINEURAL AS NEEDED
Status: DISCONTINUED | OUTPATIENT
Start: 2021-11-02 | End: 2021-11-02 | Stop reason: HOSPADM

## 2021-11-02 RX ORDER — HYDROMORPHONE HCL/0.9% NACL/PF 0.5 MG/ML
PLASTIC BAG, INJECTION (ML) INTRAVENOUS
Status: COMPLETED
Start: 2021-11-02 | End: 2021-11-02

## 2021-11-02 RX ORDER — FENTANYL CITRATE 50 UG/ML
50 INJECTION, SOLUTION INTRAMUSCULAR; INTRAVENOUS AS NEEDED
Status: DISCONTINUED | OUTPATIENT
Start: 2021-11-02 | End: 2021-11-02 | Stop reason: HOSPADM

## 2021-11-02 RX ORDER — SODIUM CHLORIDE 0.9 % (FLUSH) 0.9 %
5-40 SYRINGE (ML) INJECTION EVERY 8 HOURS
Status: DISCONTINUED | OUTPATIENT
Start: 2021-11-02 | End: 2021-11-02 | Stop reason: HOSPADM

## 2021-11-02 RX ORDER — OXYCODONE HYDROCHLORIDE 5 MG/1
5 TABLET ORAL
Status: DISCONTINUED | OUTPATIENT
Start: 2021-11-02 | End: 2021-11-03

## 2021-11-02 RX ORDER — ROCURONIUM BROMIDE 10 MG/ML
INJECTION, SOLUTION INTRAVENOUS AS NEEDED
Status: DISCONTINUED | OUTPATIENT
Start: 2021-11-02 | End: 2021-11-02 | Stop reason: HOSPADM

## 2021-11-02 RX ORDER — LISINOPRIL 20 MG/1
20 TABLET ORAL DAILY
Status: DISCONTINUED | OUTPATIENT
Start: 2021-11-03 | End: 2021-11-04 | Stop reason: HOSPADM

## 2021-11-02 RX ORDER — HYDROMORPHONE HYDROCHLORIDE 1 MG/ML
0.2 INJECTION, SOLUTION INTRAMUSCULAR; INTRAVENOUS; SUBCUTANEOUS
Status: DISCONTINUED | OUTPATIENT
Start: 2021-11-02 | End: 2021-11-02 | Stop reason: HOSPADM

## 2021-11-02 RX ORDER — FENTANYL CITRATE 50 UG/ML
INJECTION, SOLUTION INTRAMUSCULAR; INTRAVENOUS AS NEEDED
Status: DISCONTINUED | OUTPATIENT
Start: 2021-11-02 | End: 2021-11-02 | Stop reason: HOSPADM

## 2021-11-02 RX ORDER — HYDROMORPHONE HYDROCHLORIDE 1 MG/ML
0.5 INJECTION, SOLUTION INTRAMUSCULAR; INTRAVENOUS; SUBCUTANEOUS
Status: ACTIVE | OUTPATIENT
Start: 2021-11-02 | End: 2021-11-03

## 2021-11-02 RX ORDER — MIDAZOLAM HYDROCHLORIDE 1 MG/ML
0.5 INJECTION, SOLUTION INTRAMUSCULAR; INTRAVENOUS
Status: DISCONTINUED | OUTPATIENT
Start: 2021-11-02 | End: 2021-11-02 | Stop reason: HOSPADM

## 2021-11-02 RX ORDER — SODIUM CHLORIDE, SODIUM LACTATE, POTASSIUM CHLORIDE, CALCIUM CHLORIDE 600; 310; 30; 20 MG/100ML; MG/100ML; MG/100ML; MG/100ML
125 INJECTION, SOLUTION INTRAVENOUS CONTINUOUS
Status: DISCONTINUED | OUTPATIENT
Start: 2021-11-02 | End: 2021-11-02 | Stop reason: HOSPADM

## 2021-11-02 RX ORDER — HYDROCHLOROTHIAZIDE 25 MG/1
12.5 TABLET ORAL DAILY
Status: DISCONTINUED | OUTPATIENT
Start: 2021-11-03 | End: 2021-11-04 | Stop reason: HOSPADM

## 2021-11-02 RX ORDER — EPHEDRINE SULFATE/0.9% NACL/PF 50 MG/5 ML
SYRINGE (ML) INTRAVENOUS AS NEEDED
Status: DISCONTINUED | OUTPATIENT
Start: 2021-11-02 | End: 2021-11-02 | Stop reason: HOSPADM

## 2021-11-02 RX ORDER — MIDAZOLAM HYDROCHLORIDE 1 MG/ML
1 INJECTION, SOLUTION INTRAMUSCULAR; INTRAVENOUS AS NEEDED
Status: DISCONTINUED | OUTPATIENT
Start: 2021-11-02 | End: 2021-11-02 | Stop reason: HOSPADM

## 2021-11-02 RX ORDER — HYDROMORPHONE HYDROCHLORIDE 2 MG/ML
INJECTION, SOLUTION INTRAMUSCULAR; INTRAVENOUS; SUBCUTANEOUS AS NEEDED
Status: DISCONTINUED | OUTPATIENT
Start: 2021-11-02 | End: 2021-11-02 | Stop reason: HOSPADM

## 2021-11-02 RX ORDER — DEXAMETHASONE SODIUM PHOSPHATE 4 MG/ML
INJECTION, SOLUTION INTRA-ARTICULAR; INTRALESIONAL; INTRAMUSCULAR; INTRAVENOUS; SOFT TISSUE AS NEEDED
Status: DISCONTINUED | OUTPATIENT
Start: 2021-11-02 | End: 2021-11-02 | Stop reason: HOSPADM

## 2021-11-02 RX ORDER — FENTANYL CITRATE 50 UG/ML
25 INJECTION, SOLUTION INTRAMUSCULAR; INTRAVENOUS
Status: DISCONTINUED | OUTPATIENT
Start: 2021-11-02 | End: 2021-11-02 | Stop reason: HOSPADM

## 2021-11-02 RX ORDER — GUAIFENESIN 100 MG/5ML
81 LIQUID (ML) ORAL DAILY
Status: DISCONTINUED | OUTPATIENT
Start: 2021-11-03 | End: 2021-11-04 | Stop reason: HOSPADM

## 2021-11-02 RX ORDER — SODIUM CHLORIDE 9 MG/ML
125 INJECTION, SOLUTION INTRAVENOUS CONTINUOUS
Status: DISPENSED | OUTPATIENT
Start: 2021-11-02 | End: 2021-11-03

## 2021-11-02 RX ORDER — ONDANSETRON 2 MG/ML
4 INJECTION INTRAMUSCULAR; INTRAVENOUS
Status: DISPENSED | OUTPATIENT
Start: 2021-11-02 | End: 2021-11-03

## 2021-11-02 RX ORDER — DIPHENHYDRAMINE HYDROCHLORIDE 50 MG/ML
12.5 INJECTION, SOLUTION INTRAMUSCULAR; INTRAVENOUS
Status: ACTIVE | OUTPATIENT
Start: 2021-11-02 | End: 2021-11-03

## 2021-11-02 RX ORDER — ONDANSETRON 2 MG/ML
INJECTION INTRAMUSCULAR; INTRAVENOUS AS NEEDED
Status: DISCONTINUED | OUTPATIENT
Start: 2021-11-02 | End: 2021-11-02 | Stop reason: HOSPADM

## 2021-11-02 RX ORDER — SODIUM CHLORIDE 0.9 % (FLUSH) 0.9 %
5-40 SYRINGE (ML) INJECTION AS NEEDED
Status: DISCONTINUED | OUTPATIENT
Start: 2021-11-02 | End: 2021-11-04 | Stop reason: HOSPADM

## 2021-11-02 RX ORDER — LANOLIN ALCOHOL/MO/W.PET/CERES
5 CREAM (GRAM) TOPICAL
Status: DISCONTINUED | OUTPATIENT
Start: 2021-11-02 | End: 2021-11-04 | Stop reason: HOSPADM

## 2021-11-02 RX ORDER — ACETAMINOPHEN 500 MG
1000 TABLET ORAL EVERY 6 HOURS
Status: DISCONTINUED | OUTPATIENT
Start: 2021-11-02 | End: 2021-11-04 | Stop reason: HOSPADM

## 2021-11-02 RX ORDER — CYCLOBENZAPRINE HCL 10 MG
10 TABLET ORAL
Status: DISCONTINUED | OUTPATIENT
Start: 2021-11-02 | End: 2021-11-04 | Stop reason: HOSPADM

## 2021-11-02 RX ORDER — SUCCINYLCHOLINE CHLORIDE 20 MG/ML
INJECTION INTRAMUSCULAR; INTRAVENOUS AS NEEDED
Status: DISCONTINUED | OUTPATIENT
Start: 2021-11-02 | End: 2021-11-02 | Stop reason: HOSPADM

## 2021-11-02 RX ORDER — NALOXONE HYDROCHLORIDE 0.4 MG/ML
0.4 INJECTION, SOLUTION INTRAMUSCULAR; INTRAVENOUS; SUBCUTANEOUS AS NEEDED
Status: DISCONTINUED | OUTPATIENT
Start: 2021-11-02 | End: 2021-11-04 | Stop reason: HOSPADM

## 2021-11-02 RX ORDER — FACIAL-BODY WIPES
10 EACH TOPICAL DAILY PRN
Status: DISCONTINUED | OUTPATIENT
Start: 2021-11-04 | End: 2021-11-04 | Stop reason: HOSPADM

## 2021-11-02 RX ORDER — ALBUTEROL SULFATE 90 UG/1
2 AEROSOL, METERED RESPIRATORY (INHALATION)
Status: DISCONTINUED | OUTPATIENT
Start: 2021-11-02 | End: 2021-11-04 | Stop reason: HOSPADM

## 2021-11-02 RX ORDER — OXYCODONE AND ACETAMINOPHEN 5; 325 MG/1; MG/1
1 TABLET ORAL AS NEEDED
Status: DISCONTINUED | OUTPATIENT
Start: 2021-11-02 | End: 2021-11-02 | Stop reason: HOSPADM

## 2021-11-02 RX ORDER — PROPOFOL 10 MG/ML
INJECTION, EMULSION INTRAVENOUS AS NEEDED
Status: DISCONTINUED | OUTPATIENT
Start: 2021-11-02 | End: 2021-11-02 | Stop reason: HOSPADM

## 2021-11-02 RX ORDER — ONDANSETRON 2 MG/ML
4 INJECTION INTRAMUSCULAR; INTRAVENOUS AS NEEDED
Status: DISCONTINUED | OUTPATIENT
Start: 2021-11-02 | End: 2021-11-02 | Stop reason: HOSPADM

## 2021-11-02 RX ADMIN — HYDROMORPHONE HYDROCHLORIDE: 10 INJECTION INTRAMUSCULAR; INTRAVENOUS; SUBCUTANEOUS at 15:24

## 2021-11-02 RX ADMIN — SODIUM CHLORIDE, POTASSIUM CHLORIDE, SODIUM LACTATE AND CALCIUM CHLORIDE: 600; 310; 30; 20 INJECTION, SOLUTION INTRAVENOUS at 11:50

## 2021-11-02 RX ADMIN — SUCCINYLCHOLINE CHLORIDE 160 MG: 20 INJECTION, SOLUTION INTRAMUSCULAR; INTRAVENOUS at 12:17

## 2021-11-02 RX ADMIN — Medication: at 15:24

## 2021-11-02 RX ADMIN — Medication 10 MG: at 12:50

## 2021-11-02 RX ADMIN — MIDAZOLAM 0.5 MG: 1 INJECTION INTRAMUSCULAR; INTRAVENOUS at 15:21

## 2021-11-02 RX ADMIN — KETAMINE HYDROCHLORIDE 50 MG: 10 INJECTION, SOLUTION INTRAMUSCULAR; INTRAVENOUS at 12:42

## 2021-11-02 RX ADMIN — MIDAZOLAM 2 MG: 1 INJECTION INTRAMUSCULAR; INTRAVENOUS at 12:07

## 2021-11-02 RX ADMIN — SODIUM CHLORIDE 125 ML/HR: 9 INJECTION, SOLUTION INTRAVENOUS at 15:28

## 2021-11-02 RX ADMIN — Medication 4.5 MG: at 22:50

## 2021-11-02 RX ADMIN — HYDROMORPHONE HYDROCHLORIDE 0.5 MG: 2 INJECTION, SOLUTION INTRAMUSCULAR; INTRAVENOUS; SUBCUTANEOUS at 13:33

## 2021-11-02 RX ADMIN — DOCUSATE SODIUM 50 MG AND SENNOSIDES 8.6 MG 1 TABLET: 8.6; 5 TABLET, FILM COATED ORAL at 22:50

## 2021-11-02 RX ADMIN — Medication 10 MG: at 13:12

## 2021-11-02 RX ADMIN — CEFAZOLIN SODIUM 2 G: 1 INJECTION, POWDER, FOR SOLUTION INTRAMUSCULAR; INTRAVENOUS at 22:49

## 2021-11-02 RX ADMIN — ONDANSETRON HYDROCHLORIDE 4 MG: 2 INJECTION, SOLUTION INTRAMUSCULAR; INTRAVENOUS at 14:38

## 2021-11-02 RX ADMIN — FENTANYL CITRATE 25 MCG: 50 INJECTION INTRAMUSCULAR; INTRAVENOUS at 15:20

## 2021-11-02 RX ADMIN — PROPOFOL 150 MG: 10 INJECTION, EMULSION INTRAVENOUS at 12:17

## 2021-11-02 RX ADMIN — Medication 10 ML: at 22:51

## 2021-11-02 RX ADMIN — WATER 2 G: 1 INJECTION INTRAMUSCULAR; INTRAVENOUS; SUBCUTANEOUS at 12:40

## 2021-11-02 RX ADMIN — Medication 10 MG: at 13:30

## 2021-11-02 RX ADMIN — DEXAMETHASONE SODIUM PHOSPHATE 8 MG: 4 INJECTION, SOLUTION INTRAMUSCULAR; INTRAVENOUS at 12:25

## 2021-11-02 RX ADMIN — FENTANYL CITRATE 25 MCG: 50 INJECTION, SOLUTION INTRAMUSCULAR; INTRAVENOUS at 12:07

## 2021-11-02 RX ADMIN — Medication 10 MG: at 14:37

## 2021-11-02 RX ADMIN — ACETAMINOPHEN 650 MG: 325 TABLET ORAL at 10:44

## 2021-11-02 RX ADMIN — HYDROMORPHONE HYDROCHLORIDE 0.5 MG: 2 INJECTION, SOLUTION INTRAMUSCULAR; INTRAVENOUS; SUBCUTANEOUS at 15:10

## 2021-11-02 RX ADMIN — ROCURONIUM BROMIDE 10 MG: 10 SOLUTION INTRAVENOUS at 12:17

## 2021-11-02 RX ADMIN — ONDANSETRON 4 MG: 2 INJECTION INTRAMUSCULAR; INTRAVENOUS at 17:29

## 2021-11-02 RX ADMIN — ACETAMINOPHEN 1000 MG: 500 TABLET ORAL at 17:30

## 2021-11-02 RX ADMIN — LIDOCAINE HYDROCHLORIDE 100 MG: 20 INJECTION, SOLUTION EPIDURAL; INFILTRATION; INTRACAUDAL; PERINEURAL at 12:17

## 2021-11-02 RX ADMIN — FENTANYL CITRATE 75 MCG: 50 INJECTION, SOLUTION INTRAMUSCULAR; INTRAVENOUS at 12:17

## 2021-11-02 RX ADMIN — PROPOFOL 50 MG: 10 INJECTION, EMULSION INTRAVENOUS at 12:21

## 2021-11-02 NOTE — PERIOP NOTES
TRANSFER - OUT REPORT:    [facility] - [facility]       Report consisted of patients Situation, Background, Assessment and   Recommendations(SBAR). Time Pre op antibiotic given:1240  Anesthesia Stop time: 1046  Francois Present on Transfer to floor:no  Order for Francois on Chart:no  Discharge Prescriptions with Chart:no    Information from the following report(s) SBAR, OR Summary, MAR and Accordion was reviewed with the receiving nurse. Opportunity for questions and clarification was provided. Is the patient on 02? YES       L/Min 1       Other -    Is the patient on a monitor? NO    Is the nurse transporting with the patient? NO    Surgical Waiting Area notified of patient's transfer from PACU? NO      The following personal items collected during your admission accompanied patient upon transfer:   Dental Appliance: Dental Appliances:  (2 bags of belongings & glasses returned in pacu-glasses n ba)  Vision: Visual Aid: Glasses, Other (comment) (Checked in by RN)  Hearing Aid:    Jewelry: Jewelry: None  Clothing: Clothing: Footwear, Jacket/Coat, Pants, Shirt, Socks, Undergarments  Other Valuables:  Other Valuables: Eyeglasses  Valuables sent to safe:

## 2021-11-02 NOTE — ANESTHESIA PREPROCEDURE EVALUATION
Relevant Problems   RESPIRATORY SYSTEM   (+) Other emphysema (HCC)      NEUROLOGY   (+) Depression with anxiety      GASTROINTESTINAL   (+) Gastroesophageal reflux disease without esophagitis      ENDOCRINE   (+) Acquired hypothyroidism       Anesthetic History   No history of anesthetic complications            Review of Systems / Medical History  Patient summary reviewed, nursing notes reviewed and pertinent labs reviewed    Pulmonary    COPD      Smoker         Neuro/Psych   Within defined limits      Psychiatric history     Cardiovascular  Within defined limits  Hypertension: well controlled              Exercise tolerance: >4 METS     GI/Hepatic/Renal  Within defined limits   GERD: well controlled           Endo/Other  Within defined limits    Hypothyroidism  Arthritis     Other Findings              Physical Exam    Airway  Mallampati: II  TM Distance: > 6 cm  Neck ROM: normal range of motion   Mouth opening: Normal     Cardiovascular  Regular rate and rhythm,  S1 and S2 normal,  no murmur, click, rub, or gallop             Dental  No notable dental hx       Pulmonary  Breath sounds clear to auscultation               Abdominal  GI exam deferred       Other Findings            Anesthetic Plan    ASA: 3  Anesthesia type: general          Induction: Intravenous  Anesthetic plan and risks discussed with: Patient

## 2021-11-02 NOTE — BRIEF OP NOTE
Brief Postoperative Note    Patient: Nata Edward  YOB: 1955  MRN: [id number]    Date of Procedure: 11/2/2021     Pre-Op Diagnosis: SPONDYLOLISTHESIS STENOSIS    Post-Op Diagnosis: Same as preoperative diagnosis. Procedure(s):  L4-5 LAMINECTOMY AND MIDLINE FUSION ([facility]) (O-ARM)    Surgeon(s):  Judd Garland MD    Surgical Assistant: Physician Assistant: ARIAN Kauffman    Anesthesia: General     Estimated Blood Loss (mL): less than 50     Complications: None    Specimens: * No specimens in log *     Implants:   Implant Name Type Inv. Item Serial No.  Lot No. LRB No. Used Action   GRAFT BONE 5 CC - [id number]  GRAFT BONE 5 CC [id number] [employer]_WD NA N/A 1 Implanted   GRAFT BNE SUB M [id number] COMPRESSIBLE SPNG STRP PROVIDE - [id number]  GRAFT BNE SUB M [id number] COMPRESSIBLE SPNG STRP PROVIDE [id number] [employer]_WD NA N/A 1 Implanted   GRAFT BONE 10 CC - [id number]  GRAFT BONE 10 CC [id number] [employer]_WD NA N/A 1 Implanted   interbody cage short 7x22.5   NA [employer] [id number] N/A 2 Implanted   SCREW SPNL L35MM DIA5. 5MM CO CHROM TI MULTIAXIAL FOR 4.75MM - SNA  SCREW SPNL L35MM DIA5. 5MM CO CHROM TI MULTIAXIAL FOR 4.75MM NA [employer] SOFAMOR DANEK_WD NA N/A 4 Implanted   SET SCR SPNL DIA4. 75MM TI BRK OFF [address] SOLERA - SNA  SET SCR SPNL DIA4. 75MM TI BRK OFF [address] [employer] NA [employer] SOFAMOR DANEK_WD NA N/A 4 Implanted   KIM SPNL L40MM DIA4. 75MM CO CHROM PREBENT [facility] SOLERA - SNA  KIM SPNL L40MM DIA4. 75MM CO CHROM PREBENT [address] [employer] NA [employer] SOFAMOR DANEK_WD NA N/A 2 Implanted       Drains: * No LDAs found *    Findings: Stenosis    Electronically Signed by ARIAN Torres on 11/2/2021 at 2:55 PM

## 2021-11-02 NOTE — PERIOP NOTES
TRANSFER - OUT REPORT:    Verbal report given to IDALMIS RN(name) on Ravinder Mason  being transferred to (unit) for routine post - op       Report consisted of patients Situation, Background, Assessment and   Recommendations(SBAR). Time Pre op antibiotic given:Y  Anesthesia Stop time: Y  Francois Present on Transfer to floor:N  Order for Francois on Chart:N  Discharge Prescriptions with Chart:N    Information from the following report(s) SBAR was reviewed with the receiving nurse. Opportunity for questions and clarification was provided. Is the patient on 02? Y       L/Min 1       Other     Is the patient on a monitor? NO    Is the nurse transporting with the patient? NO    Surgical Waiting Area notified of patient's transfer from PACU? YES      The following personal items collected during your admission accompanied patient upon transfer:   Dental Appliance: Dental Appliances:  (2 bags of belongings & glasses returned in pacu-glasses n ba)  Vision: Visual Aid: Glasses, Other (comment) (Checked in by RN)  Hearing Aid:    Jewelry: Jewelry: None  Clothing: Clothing: Footwear, Jacket/Coat, Pants, Shirt, Socks, Undergarments  Other Valuables:  Other Valuables: Eyeglasses  Valuables sent to safe:

## 2021-11-02 NOTE — ANESTHESIA POSTPROCEDURE EVALUATION
Post-Anesthesia Evaluation and Assessment    Patient: Blanquita Huber MRN: [id number]  SSN: [id number]    YOB: 1955  Age: 74 y.o. Sex: female      I have evaluated the patient and they are stable and ready for discharge from the PACU. Cardiovascular Function/Vital Signs  Visit Vitals  /69   Pulse 90   Temp 36.6 °C (97.8 °F)   Resp 14   Ht 5' 5.5\" (1.664 m)   Wt 73 kg (161 lb)   SpO2 97%   BMI 26.38 kg/m²       Patient is status post General anesthesia for Procedure(s):  L4-5 LAMINECTOMY AND MIDLINE FUSION ([facility]) (O-ARM). Nausea/Vomiting: None    Postoperative hydration reviewed and adequate. Pain:  Pain Scale 1: Numeric (0 - 10) (11/02/21 1016)  Pain Intensity 1: 7 (11/02/21 1016)   Managed    Neurological Status:   Neuro (WDL): Within Defined Limits (11/02/21 1030)   At baseline    Mental Status, Level of Consciousness: Alert and  oriented to person, place, and time    Pulmonary Status:   O2 Device: Nasal cannula (11/02/21 1513)   Adequate oxygenation and airway patent    Complications related to anesthesia: None    Post-anesthesia assessment completed. No concerns    Signed By: Isaac Guerrero MD     November 2, 2021              Procedure(s):  L4-5 LAMINECTOMY AND MIDLINE FUSION ([facility]) (O-ARM). general    <BSHSIANPOST>    INITIAL Post-op Vital signs:   Vitals Value Taken Time   /61 11/02/21 1520   Temp 36.6 °C (97.8 °F) 11/02/21 1513   Pulse 76 11/02/21 1521   Resp 13 11/02/21 1521   SpO2 98 % 11/02/21 1521   Vitals shown include unvalidated device data.

## 2021-11-03 LAB
ANION GAP SERPL CALC-SCNC: 3 MMOL/L (ref 5–15)
BUN SERPL-MCNC: 17 MG/DL (ref 6–20)
BUN/CREAT SERPL: 22 (ref 12–20)
CALCIUM SERPL-MCNC: 9.5 MG/DL (ref 8.5–10.1)
CHLORIDE SERPL-SCNC: 104 MMOL/L (ref 97–108)
CO2 SERPL-SCNC: 29 MMOL/L (ref 21–32)
CREAT SERPL-MCNC: 0.77 MG/DL (ref 0.55–1.02)
GLUCOSE SERPL-MCNC: 127 MG/DL (ref 65–100)
HGB BLD-MCNC: 13.3 G/DL (ref 11.5–16)
POTASSIUM SERPL-SCNC: 3.8 MMOL/L (ref 3.5–5.1)
SODIUM SERPL-SCNC: 136 MMOL/L (ref 136–145)

## 2021-11-03 PROCEDURE — 80048 BASIC METABOLIC PNL TOTAL CA: CPT

## 2021-11-03 PROCEDURE — 97535 SELF CARE MNGMENT TRAINING: CPT

## 2021-11-03 PROCEDURE — 97530 THERAPEUTIC ACTIVITIES: CPT

## 2021-11-03 PROCEDURE — 97165 OT EVAL LOW COMPLEX 30 MIN: CPT

## 2021-11-03 PROCEDURE — 74011000250 HC RX REV CODE- 250: Performed by: STUDENT IN AN ORGANIZED HEALTH CARE EDUCATION/TRAINING PROGRAM

## 2021-11-03 PROCEDURE — 85018 HEMOGLOBIN: CPT

## 2021-11-03 PROCEDURE — 74011250636 HC RX REV CODE- 250/636: Performed by: STUDENT IN AN ORGANIZED HEALTH CARE EDUCATION/TRAINING PROGRAM

## 2021-11-03 PROCEDURE — 74011250637 HC RX REV CODE- 250/637: Performed by: PHYSICIAN ASSISTANT

## 2021-11-03 PROCEDURE — 97161 PT EVAL LOW COMPLEX 20 MIN: CPT

## 2021-11-03 PROCEDURE — 65270000029 HC RM PRIVATE

## 2021-11-03 PROCEDURE — 36415 COLL VENOUS BLD VENIPUNCTURE: CPT

## 2021-11-03 PROCEDURE — 97116 GAIT TRAINING THERAPY: CPT

## 2021-11-03 PROCEDURE — 74011250637 HC RX REV CODE- 250/637: Performed by: STUDENT IN AN ORGANIZED HEALTH CARE EDUCATION/TRAINING PROGRAM

## 2021-11-03 RX ORDER — HYDROMORPHONE HYDROCHLORIDE 4 MG/1
4 TABLET ORAL
Status: DISCONTINUED | OUTPATIENT
Start: 2021-11-03 | End: 2021-11-04 | Stop reason: HOSPADM

## 2021-11-03 RX ORDER — IBUPROFEN 200 MG
1 TABLET ORAL DAILY
Status: DISCONTINUED | OUTPATIENT
Start: 2021-11-03 | End: 2021-11-04 | Stop reason: HOSPADM

## 2021-11-03 RX ORDER — HYDROMORPHONE HYDROCHLORIDE 2 MG/1
2 TABLET ORAL
Status: DISCONTINUED | OUTPATIENT
Start: 2021-11-03 | End: 2021-11-04 | Stop reason: HOSPADM

## 2021-11-03 RX ADMIN — ACETAMINOPHEN 1000 MG: 500 TABLET ORAL at 05:45

## 2021-11-03 RX ADMIN — LISINOPRIL 20 MG: 20 TABLET ORAL at 09:30

## 2021-11-03 RX ADMIN — DOCUSATE SODIUM 50 MG AND SENNOSIDES 8.6 MG 1 TABLET: 8.6; 5 TABLET, FILM COATED ORAL at 17:59

## 2021-11-03 RX ADMIN — ONDANSETRON 4 MG: 2 INJECTION INTRAMUSCULAR; INTRAVENOUS at 13:07

## 2021-11-03 RX ADMIN — Medication 1000 UNITS: at 09:30

## 2021-11-03 RX ADMIN — Medication 1 TABLET: at 09:30

## 2021-11-03 RX ADMIN — Medication 10 ML: at 05:46

## 2021-11-03 RX ADMIN — LEVOTHYROXINE SODIUM 175 MCG: 0.15 TABLET ORAL at 06:42

## 2021-11-03 RX ADMIN — POLYETHYLENE GLYCOL 3350 17 G: 17 POWDER, FOR SOLUTION ORAL at 09:29

## 2021-11-03 RX ADMIN — OXYCODONE HYDROCHLORIDE 10 MG: 5 TABLET ORAL at 13:07

## 2021-11-03 RX ADMIN — Medication 4.5 MG: at 23:16

## 2021-11-03 RX ADMIN — ASPIRIN 81 MG CHEWABLE TABLET 81 MG: 81 TABLET CHEWABLE at 09:29

## 2021-11-03 RX ADMIN — CEFAZOLIN SODIUM 2 G: 1 INJECTION, POWDER, FOR SOLUTION INTRAMUSCULAR; INTRAVENOUS at 05:45

## 2021-11-03 RX ADMIN — MULTIPLE VITAMINS W/ MINERALS TAB 1 TABLET: TAB at 09:29

## 2021-11-03 RX ADMIN — HYDROMORPHONE HYDROCHLORIDE 2 MG: 2 TABLET ORAL at 16:37

## 2021-11-03 RX ADMIN — HYDROCHLOROTHIAZIDE 12.5 MG: 25 TABLET ORAL at 09:30

## 2021-11-03 RX ADMIN — HYDROMORPHONE HYDROCHLORIDE 4 MG: 4 TABLET ORAL at 20:52

## 2021-11-03 RX ADMIN — ACETAMINOPHEN 1000 MG: 500 TABLET ORAL at 11:33

## 2021-11-03 RX ADMIN — Medication 10 ML: at 23:16

## 2021-11-03 RX ADMIN — CYCLOBENZAPRINE 10 MG: 10 TABLET, FILM COATED ORAL at 17:59

## 2021-11-03 RX ADMIN — OXYCODONE HYDROCHLORIDE 10 MG: 5 TABLET ORAL at 06:04

## 2021-11-03 RX ADMIN — Medication 5 ML: at 14:00

## 2021-11-03 RX ADMIN — OXYCODONE HYDROCHLORIDE 10 MG: 5 TABLET ORAL at 09:29

## 2021-11-03 RX ADMIN — ACETAMINOPHEN 1000 MG: 500 TABLET ORAL at 00:00

## 2021-11-03 RX ADMIN — DIPHENHYDRAMINE HYDROCHLORIDE, ZINC ACETATE: 2; .1 CREAM TOPICAL at 15:03

## 2021-11-03 RX ADMIN — DOCUSATE SODIUM 50 MG AND SENNOSIDES 8.6 MG 1 TABLET: 8.6; 5 TABLET, FILM COATED ORAL at 09:29

## 2021-11-03 RX ADMIN — ACETAMINOPHEN 1000 MG: 500 TABLET ORAL at 17:59

## 2021-11-03 NOTE — PROGRESS NOTES
Problem: Mobility Impaired (Adult and Pediatric)  Goal: *Acute Goals and Plan of Care (Insert Text)  Description: FUNCTIONAL STATUS PRIOR TO ADMISSION: Patient was independent and active without use of DME.    HOME SUPPORT PRIOR TO ADMISSION: The patient lived with her son but did not require assist.    Physical Therapy Goals  Initiated 11/3/2021    1. Patient will move from supine to sit and sit to supine  and roll side to side in bed with independence within 4 days. 2. Patient will perform sit to stand with independence within 4 days. 3. Patient will ambulate with independence for 350 feet with the least restrictive device within 4 days. 4. Patient will ascend/descend 1 stairs with 1 handrail(s) with modified independence within 4 days. 5. Patient will verbalize and demonstrate understanding of spinal precautions (No bending, lifting greater than 5 lbs, or twisting; log-roll technique; frequent repositioning as instructed) within 4 days. Outcome: Progressing Towards Goal   PHYSICAL THERAPY EVALUATION  Patient: Zenia Gonzales (6 y.o. female)  Date: 11/3/2021  Primary Diagnosis: Lumbar stenosis [M48.061]  Procedure(s) (LRB):  L4-5 LAMINECTOMY AND MIDLINE FUSION (MAZOR) (O-ARM) (N/A) 1 Day Post-Op   Precautions:   Back, No bending, no lifting greater than 5 lbs, no twisting, log-roll technique, repositioning every 20-30 min except when sleeping, brace when OOB        ASSESSMENT  Based on the objective data described below, the patient presents with post op back pain, newly acquired back precautions and minimally impaired functional mobility. Pt was provided education regarding back precautions, donning and wearing schedule of quickdraw brace, and sitting restrictions. Pt was fitted with quickdraw brace and instructed in donning/doffing. Pt ambulated with a steady gait with slow gus due to her back pain. Reviewed log roll technique and pt assisted back to bed at session end.   Anticipate pt will progress well. Current Level of Function Impacting Discharge (mobility/balance): bed mobility CGA and verbal cueing for log roll technique, ambulation x 150 feet with supervision, transfers modified independent    Functional Outcome Measure: The patient scored Total Score: 28/28 on the Tinetti outcome measure which is indicative of low fall risk. Other factors to consider for discharge: near her baseline     Patient will benefit from skilled therapy intervention to address the above noted impairments. PLAN :  Recommendations and Planned Interventions: bed mobility training, transfer training, gait training, and patient and family training/education      Frequency/Duration: Patient will be followed by physical therapy:  twice daily to address goals. Recommendation for discharge: (in order for the patient to meet his/her long term goals)  No skilled physical therapy/ follow up rehabilitation needs identified at this time. This discharge recommendation:  A follow-up discussion with the attending provider and/or case management is planned    IF patient discharges home will need the following DME: patient owns DME required for discharge, has a reacher          SUBJECTIVE:   Patient stated The brace makes my back feel better.     OBJECTIVE DATA SUMMARY:   HISTORY:    Past Medical History:   Diagnosis Date    Anxiety     Arthritis     Cataract     Chronic obstructive pulmonary disease (HCC)     Chronic pain of right groin     after multiple complications from bladder sling that became imbedded    Depression     GERD (gastroesophageal reflux disease)     HTN (hypertension)     Infiltrating ductal carcinoma of overlapping sites of both breasts in female [facility])     Lumbar stenosis with neurogenic claudication 10/15/2021    Pneumonia     Spondylisthesis 10/15/2021    Thyroid disease     UTI (urinary tract infection)      Past Surgical History:   Procedure Laterality Date    HX ADENOIDECTOMY  1960    HX BLADDER SUSPENSION      Removed     HX BREAST LUMPECTOMY Right 2009    HX CATARACT REMOVAL Bilateral     HX HYSTERECTOMY  2015    HX LYMPHADENECTOMY      HX TONSILLECTOMY  1960    HX UROLOGICAL  2014    MESH ,SLING PLACED     HX UROLOGICAL  2016    REMOVAL OF SLING     HX UROLOGICAL  2014    CYSTOCCELE/ RECTOCELE     CA BIOPSY OF BREAST, INCISIONAL Bilateral        Personal factors and/or comorbidities impacting plan of care: none    Home Situation  Home Environment: Private residence  # Steps to Enter: 1  Rails to Enter: Yes  Hand Rails : Bilateral  One/Two Story Residence: One story  Living Alone: No  Support Systems: Other Family Member(s)  Patient Expects to be Discharged to[de-identified] House  Current DME Used/Available at Home:  (reacher)  Tub or Shower Type: Tub/Shower combination    EXAMINATION/PRESENTATION/DECISION MAKING:   Critical Behavior:  Neurologic State: Alert  Orientation Level: Oriented X4  Cognition: Appropriate decision making, Appropriate for age attention/concentration  Safety/Judgement: Fall prevention, Home safety  Hearing: Auditory  Auditory Impairment: None  Skin: dressing intact    Range Of Motion:  AROM: Within functional limits           PROM: Within functional limits           Strength:    Strength: Within functional limits                    Tone & Sensation:   Tone: Normal              Sensation: Impaired (reporting tingling in R foot)               Coordination:  Coordination: Within functional limits  Vision:   Acuity: Within Defined Limits  Functional Mobility:  Bed Mobility:  Rolling: Contact guard assistance; Assist x1  Sit to Supine: Supervision;  Adaptive equipment; Assist x1  Scooting: Supervision  Transfers:  Sit to Stand: Modified independent  Stand to Sit: Modified independent              Balance:   Sitting: Intact  Standing: Intact  Ambulation/Gait Training:  Distance (ft): 150 Feet (ft)  Assistive Device: Brace/Splint; Gait belt  Ambulation - Level of Assistance: Supervision Gait Abnormalities: Antalgic              Speed/Nataliya: Slow  Step Length: Right shortened; Left shortened                  Functional Measure:  Tinetti test:    Sitting Balance: 1  Arises: 2  Attempts to Rise: 2  Immediate Standing Balance: 2  Standing Balance: 2  Nudged: 2  Eyes Closed: 1  Turn 360 Degrees - Continuous/Discontinuous: 1  Turn 360 Degrees - Steady/Unsteady: 1  Sitting Down: 2  Balance Score: 16 Balance total score  Indication of Gait: 1  R Step Length/Height: 1  L Step Length/Height: 1  R Foot Clearance: 1  L Foot Clearance: 1  Step Symmetry: 1  Step Continuity: 1  Path: 2  Trunk: 2  Walking Time: 1  Gait Score: 12 Gait total score  Total Score: 28/28 Overall total score         Tinetti Tool Score Risk of Falls  <19 = High Fall Risk  19-24 = Moderate Fall Risk  25-28 = Low Fall Risk  Tinetti ME. Performance-Oriented Assessment of Mobility Problems in Elderly Patients. [facility] 66; [id number].  (Scoring Description: PT Bulletin Feb. 10, 1993)    Older adults: Barbara Rose et al, 2009; n = 1000 [facility] elderly evaluated with ABC, CANDICE, ADL, and IADL)  · Mean CANDICE score for males aged 69-68 years = 26.21(3.40)  · Mean CANDICE score for females age 69-68 years = 25.16(4.30)  · Mean CANDICE score for males over 80 years = 23.29(6.02)  · Mean CANDICE score for females over 80 years = 17.20(8.32)        Physical Therapy Evaluation Charge Determination   History Examination Presentation Decision-Making   LOW Complexity : Zero comorbidities / personal factors that will impact the outcome / POC LOW Complexity : 1-2 Standardized tests and measures addressing body structure, function, activity limitation and / or participation in recreation  LOW Complexity : Stable, uncomplicated  LOW Complexity : FOTO score of       Based on the above components, the patient evaluation is determined to be of the following complexity level: LOW     Pain Rating:  Verbal: 6  Location: back     Activity Tolerance:   Good      After treatment patient left in no apparent distress:   Supine in bed and Call bell within reach    COMMUNICATION/EDUCATION:   The patients plan of care was discussed with: Registered nurse. Fall prevention education was provided and the patient/caregiver indicated understanding. and Patient/family agree to work toward stated goals and plan of care.     Thank you for this referral.  Apple [place]   Time Calculation: 26 mins

## 2021-11-03 NOTE — PROGRESS NOTES
7078- Attempted to call [facility] on call provider about adding nicotine patch for patient. After pressing #2 to get connected with the on-call, the phone line disconnects and states that \"an area code must be dialed. \"  Talked to nursing supervisors about this, and was informed that there was trouble contacting them the previous night as well. Will continue to attempt to call throughout the night    0030- attempted again, phone still disconnecting after pressing #2    0045- unsuccessful call. Used personal cellphone, and phone still disconnected.

## 2021-11-03 NOTE — PROGRESS NOTES
Transition of Care Plan   RUR- Low 3%   DISPOSITION: The disposition plan is home with family assistance; pending medical progression   F/U with PCP/Specialist     Transport: Family     Reason for Admission:  Lumbar stenosis     Plan for utilizing home health:      PT/OT consults pending. The pt reported that she does not feel that she will require [place] at D/C     PCP: First and Last name:  Ester Jordan NP     Name of Practice:    Are you a current patient: Yes/No: Yes    Approximate date of last visit: Within the past month    Can you participate in a virtual visit with your PCP:                     Current Advanced Directive/Advance Care Plan: Full Code      Healthcare Decision Maker:   Click here to complete [employer] including selection of the Healthcare Decision Maker Relationship (ie \"Primary\")             Primary Decision Maker[facility] - [phone]                  Transition of Care Plan: This cm met the pt at bedside to conduct the initial evaluation. The pt presented as aox4. The pt confirmed her demographics and insurance provider. The pt reported that at d/c she will transition to her brother's home on [address] and she will reside there until she feels comfortable to return home. The pt reported that she is independent with ADLs and IADLS. The pt reported that she has good family and social support. The pt reported that any scripts could be called into a pharmacy on [address] Near [facility]; filling the scritps at the OP pharmacy may be an option as well. Care Management Interventions  PCP Verified by CM: Yes  Mode of Transport at Discharge:  Other (see comment) (Family')  Transition of Care Consult (CM Consult): Discharge Planning  MyChart Signup: Yes  Discharge Durable Medical Equipment: No  Physical Therapy Consult: Yes  Occupational Therapy Consult: Yes  Speech Therapy Consult: No  Support Systems: Other Family Member(s), Child(laila), Friend/Neighbor  Confirm Follow Up Transport: Self  The Patient and/or Patient Representative was Provided with a Choice of Provider and Agrees with the Discharge Plan?: Yes  Freedom of Choice List was Provided with Basic Dialogue that Supports the Patient's Individualized Plan of Care/Goals, Treatment Preferences and Shares the Quality Data Associated with the Providers?: Yes  Discharge Location  Discharge Placement: Home  CM: [address] [place],   [phone]

## 2021-11-03 NOTE — DISCHARGE INSTRUCTIONS
After Hospital Care Plan:  Discharge Instructions Lumbar Fusion Surgery   Dr. Behzad Coburn   Patient Name: Morris Millan    Date of procedure: 11/2/2021  Date of discharge: 11/3/2021    Procedure: Procedure(s):  L4-5 LAMINECTOMY AND MIDLINE FUSION (Yancy Rios) (O-ARM)  PCP: David Crane NP    Follow up appointments  -follow up with Dr. Dr. Behzad Coburn in 2 weeks. Call [phone] to make an appointment as soon as you get home from the hospital.    [facility] ____________________   phone: _______________________  The agency will contact you to arrange dates/times for visits. Please call them if you do not hear from them within 24 hours after you are discharged  Physical therapy 3 times a week for 3 weeks  Nursing-initial assessment and as needed    When to call your Orthopaedic Surgeon:  -Signs of infection-if your incision is red; continues to have drainage; drainage has a foul odor or if you have a persistent fever over 101 degrees for 24 hours  -Nausea or vomiting, severe headache  -Loss of bowel or bladder function, inability to urinate  -Changes in sensation in your arms or legs (numbness, tingling, loss of color)  -Increased weakness-greater than before your surgery  -Severe pain or pain not relieved by medications  -Signs of a blood clot in your leg-calf pain, tenderness, redness, swelling of lower leg    When to call your Primary Care Physician:  -Concerns about medical conditions such as diabetes, high blood pressure, asthma, congestive heart failure  -Call if blood sugars are elevated, persistent headache or dizziness, coughing or congestion, constipation or diarrhea, burning with urination, abnormal heart rate    When to call 911 and go to the nearest emergency room:  -Acute onset of chest pain, shortness of breath, difficulty breathing    Activity  -You are going home a well person, be as active as possible. Your only exercise should be walking.   Start with short frequent walks and increase your walking distance each day.  -Limit the amount of time you sit to 20-30 minute intervals. Sitting for prolonged periods of time will be uncomfortable for you following surgery.  -Do NOT lift anything over 5 pounds  -Do NOT do any straining, twisting or bending  -When you are in bed, you may lay on your back or on either side. Do NOT lie on your stomach    Brace  -If you have a back brace, you should wear your brace at all times when you are out of bed. Do not wear the brace while in bed or showering.  -Remember to always wear a cotton t-shirt underneath your brace.  -Do not bend or twist when your brace is off    Diet  -Resume usual diet; drink plenty of fluids; eat foods high in fiber  -It is important to have regular bowel movements. Pain medications may cause constipation. You may want to take a stool softener (such as Senokot-S or Colace) to prevent constipation.   -If constipation occurs, take a laxative (such as Dulcolax tablets, Milk of Magnesia, or a suppository). Laxatives should only be used if the above preventable measures have failed and you still have not had a bowel movement after three days    Driving  -You may not drive or return to work until instructed by your physician. However, you may ride in the car for short periods of time. Incision Care  Your incision has been closed with absorbable sutures and the Zipline skin closure system. This will assist with healing. The Donnice Reamer is to remain on your incision for 2 weeks. A dry dressing (ABD and tape) will be placed over it and should be changed daily, for at least the first several days after your surgery. If you have no incisional drainage, you may leave the incision open to air if you wish, still leaving the Zipline in place. Please make sure to wash your hands prior to touching your dressing. You may take brief showers but do not run the water directly onto the wound.  After your shower, blot your incision dry with a soft towel and replace the dry dressing. Do not allow the tape to come in contact with the Zipline. Do not rub or apply any lotions or ointments to your incision site. Do not soak or scrub your wound. The Zipline dressing will be removed during your two week follow-up appointment. If you experience drainage leaking from underneath the Zipline or if it peels off before 2 weeks, please contact your orthopedic surgeons office. Showering  -You may shower in approximately 4 days after your surgery.    -Leave the dressing on during your shower. Do NOT allow the water to run directly onto your dressing. Once you get out of the shower, gently pat the dressing dry. -Reminder- your brace can be removed while showering. Remember to not bend or twist while your brace is off.    -Do not take a tub bath. Preventing blood clots  -You have been given T.E.D. stockings to wear. Continue to wear these for 7 days after your discharge. Put them on in the morning and take them off at night.    -They are used to increase your circulation and prevent blood clots from forming in your legs  -T. E.D. stockings can be machine washed, temperature not to exceed 160° F (71°C) and machine dried for 15 to 20 minutes, temperature not to exceed 250° F (121°C). Pain management  -Take pain medication as prescribed; decrease the amount you use as your pain lessens  -DO not wait until you are in extreme pain to take your medication.  -Avoid alcoholic beverages while taking pain medication    Pain Medication Safety  DO:  -Read the Medication Guide   -Take your medicine exactly as prescribed   -Store your medicine away from children and in a safe place   -Flush unused medicine down the toilet   -Call your healthcare provider for medical advice about side effects. You may report side effects to FDA at 3-406-FDA-6511.   -Please be aware that many medications contain Tylenol.   We do not want you to over medicate so please read the information below as a guide. Do not take more than 4 Grams of Tylenol in a 24 hour period. (There are 1000 milligrams in one Gram)                                                                                                                                                                                                                                                Percocet contains 325 mg of Tylenol per tablet (do not take more than 12 tablets in 24 hours)  Lortab contains 500 mg of Tylenol per tablet (do not take more than 8 tablets in 24 hours)  Norco contains 325 mg of Tylenol per tablet (do not take more than 12 tablets in 24 hours). DO NOT:  -Do not give your medicine to others   -Do not take medicine unless it was prescribed for you   -Do not stop taking your medicine without talking to your healthcare provider   -Do not break, chew, crush, dissolve, or inject your medicine. If you cannot swallow your medicine whole, talk to your healthcare provider.  -Do not drink alcohol while taking this medicine  -Do not take anti-inflammatory medications or aspirin unless instructed by your     physician.

## 2021-11-03 NOTE — OP NOTES
[address]   OPERATIVE REPORT    Name:  Chloe Peña  MR#:  [id number]  :  1955  ACCOUNT #:  [de-identified]  DATE OF SERVICE:  2021    PREOPERATIVE DIAGNOSES:  Lumbar spondylolisthesis L4-5, lumbar stenosis L4-5. POSTOPERATIVE DIAGNOSES:  Lumbar spondylolisthesis L4-5, lumbar stenosis L4-5. PROCEDURE PERFORMED:  Lumbar laminectomy L4-L5, posterior lumbar fusion L4-5, with cortical screw approach. Transforaminal interbody fusion L4-5. Robotic guidance for cortical screw placement. SURGEON:  Castillo Garcia MD    ASSISTANT:  Elena Nicole PA-C    ANESTHESIA:  General.    COMPLICATIONS:  None. SPECIMENS REMOVED:  None. IMPLANTS:  Medtronic Solera and Medtronic Catalyft. ESTIMATED BLOOD LOSS:  Minimal.    INDICATIONS:  This is a pleasant 20-year-old female with chronic back pain, bilateral leg pain, left greater than right, grade 1 spondylolisthesis at L4-5 with spinal stenosis. She had failed conservative treatment,  understood the risk and benefits and elected to proceed. PROCEDURE:  The patient was identified, brought to the operative suite, and underwent general anesthesia without difficulty. Preoperative neuromonitoring was placed, baselines obtained and these remained stable throughout the surgical procedure. Perioperative antibiotics also given to the patient. The patient was placed prone on the [facility] table with all bony prominences well padded. Back was prepped and draped sterilely. The Hitsbook robotic system was attached to the right hand side of the bed as well. This was also draped out sterilely. After prepping and draping, time-out was confirmed. Midline incision was made approximately over the L4-5. Dissection was carried down with exposure of the L4-5 hypertrophic facets which had synovitis as well as synovial cyst and removed the facet capsules and pars region of L4-5 and L5-S1 were identified.   PSIS pin was placed on the right-hand side and the [employer]i robotic system was attached to this as well. We draped a sterile towel over the surgical field and we did a 3-defined scan using the optical component of the robotic arm. Having defined the surgical field, we then placed the arm in the snapshot position and we did synchronization of the end effector to the navigation component of the system. Star marker was brought in the mid portion of the wound and a sterile drape was placed over the surgical field. Monscierge O2 arm was brought in and we did intraoperative spin to obtain a 3-D image of the lumbosacral spine. Using this 3-D image, we were able to plan our cortical screw trajectories at L4 and L5. We used a standard work flow of robotic guided, navigated confirmed, high-speed drill to 30-mm followed by tapping and placement of Monscierge Solera 4.75 screws. These all were tested with neuromonitoring with the tap and the screw and no breach was below 20 mA. X-rays after screws were placed and demonstrated good fixation. We then started the decompressive procedure with the interspinous ligament taken down and the L4 lamina was undercut, partial facet resections as well as severe lateral recess stenosis noted bilaterally, left greater than right. The transverse nerve roots were decompressed with removal of the partial facets as well as undercutting the L5 lamina and the remainder of the ligamentum. We undercut the superior articular process as well for decompression of the lateral recess in the exiting L4 nerve roots. Hemostasis with bipolar cautery. After satisfactory decompression, we did bilateral annulotomies at the L4-5 disk space. We then evacuated the disk with pituitary rongeurs, curettes, and pedro. We evacuated the disk space to approximately 12 mm height.   We then removed the PSIS pin on the right-hand side and we aspirated 10 mL of bone marrow aspirate and then mixed this with OsteoAMP sponge which was then placed in the anterior one-third column of the disk space. This was followed by placement of two Medtronic short Catalyft grafts, expandable grafts, across the disk space. These were expanded to approximately 12 mm height with good restoration of lumbar lordosis as well as foraminal height. Wounds were irrigated again with Irrisept irrigation as well as pulse irrigation. We decorticated the remainder of the L4-5 facets. 40 mm rods were attached to the pedicle screws and set screws and final tightening was performed. Final x-rays demonstrated stable fixation. We did pulse irrigation. We then packed the remainder of the OsteoAMP and local bone into the posterior facet joint. FloSeal for hemostasis. Vancomycin sprinkled into the wound. #1 Stratafix on the fascial layer, 2-0 Stratafix and 3-0 Monocryl on the skin. Steri's placed. The patient returned to PACU in stable condition. The physician assistant was present during the entire operative procedure and assisted in all critical elements of the surgery. No surgical assist or resident was available.      El Loyd MD      [id number]  D:  11/02/2021 14:41  T:  11/02/2021 21:55  JOB #:  [id number]

## 2021-11-03 NOTE — PROGRESS NOTES
Spine Surgery Progress Note    Admit Date: 11/2/2021   LOS: 1 day      Daily Progress Note: 11/3/2021    POD:1 Day Post-Op    S/P: Procedure(s):  L4-5 LAMINECTOMY AND MIDLINE FUSION (MAZOR) (O-ARM)    Visit Vitals  BP (!) 148/71 (BP 1 Location: Right arm, BP Patient Position: Sitting)   Pulse 65   Temp 97.7 °F (36.5 °C)   Resp 18   Ht 5' 5.5\" (1.664 m)   Wt 73 kg (161 lb)   SpO2 100%   BMI 26.38 kg/m²      Lab Results   Component Value Date/Time    HGB 13.3 11/03/2021 01:22 AM    INR 1.0 10/27/2021 12:18 PM       Voiding status: voiding without issue  +flatus    Calves soft/NTTP Bilaterally. Moving LE well. Neurocirculatory exam WNL. Motor and sensation intact. Incision OK; no drainage. Dressing clean and dry. Patient doing well overall. No nausea or vomiting. Pain well controlled on current medications. Complaints of itching/rash on her upper back  Denies nausea, vomiting, fever, chills, chest pain, dyspnea, headache. Plan:  Cont pain control - switch oxy to dilaudid as pt historically tolerates it better  Cont PT/OT  Benadryl cream to rash  CM consult for [place]    Discharge pending; likely tomorrow. All questions were answered. Follow up in Dr. Tonia Shine office in 2 weeks, sooner if needed.     ARIAN Solorio

## 2021-11-03 NOTE — PROGRESS NOTES
Orthopedic Spine Progress Note  Post Op day: 1 Day Post-Op    November 3, 2021 7:48 AM   Admit Date: 2021  Procedure: Procedure(s):  L4-5 LAMINECTOMY AND MIDLINE FUSION ([facility]) (O-ARM)    Subjective: Leeann Section has no complaints. Mild right leg numbness. Tolerating diet. No N/V. Pain Control:   Pain Assessment  Pain Scale 1: Numeric (0 - 10)  Pain Intensity 1: 5  Pain Onset 1: post op  Pain Location 1: Back  Pain Orientation 1: Lower  Pain Description 1: Sore  Pain Intervention(s) 1: Encouraged PCA    Objective:          Physical Exam:  General:  Alert and oriented. No acute distress. Heart:  Respirations unlabored. Abdomen:   Extremities: Soft, non-tender. No evidence of cyanosis. Pulses palpable in both upper and lower extremities. Neurologic:  Musculoskeletal:  No new motor deficits. Neurovascular exam within normal limits. Sensation stable. Motor: unchanged C5-T1 and L2-S1. Sally's sign negative in bilateral lower extremities. Calves soft, nontender upon palpation and with passive twitch. Moves both upper and lower extremities. Incision: clean, dry, and intact. No significant erythema or swelling. No active drainage noted. Vital Signs:    Blood pressure 104/60, pulse 61, temperature 97.6 °F (36.4 °C), resp. rate 16, height 5' 5.5\" (1.664 m), weight 73 kg (161 lb), SpO2 94 %. Temp (24hrs), Av.8 °F (36.6 °C), Min:97.6 °F (36.4 °C), Max:98.2 °F (36.8 °C)      LAB:    Recent Labs     21  012   HGB 13.3     Lab Results   Component Value Date/Time    Sodium 136 2021 01:22 AM    Potassium 3.8 2021 01:22 AM    Chloride 104 2021 01:22 AM    CO2 29 2021 01:22 AM    Glucose 127 (H) 2021 01:22 AM    BUN 17 2021 01:22 AM    Creatinine 0.77 2021 01:22 AM    Calcium 9.5 2021 01:22 AM       Intake/Output:No intake/output data recorded.   1901 -  0700  In: 1000 [I.V.:1000]  Out: 410 [Urine:400]    PT/OT: Gait:                    Assessment:   Patient is 1 Day Post-Op s/p Procedure(s):  L4-5 LAMINECTOMY AND MIDLINE FUSION ([facility]) (O-ARM)    Plan:     1. Continue PT/OT  2. Continue established methods of pain control  3. VTE Prophylaxes - TEDS &/or SCDs   4. Advance diet  5.   Home health consult        Signed By: Nacho Ontiveros MD

## 2021-11-03 NOTE — PROGRESS NOTES
Problem: Mobility Impaired (Adult and Pediatric)  Goal: *Acute Goals and Plan of Care (Insert Text)  Description: FUNCTIONAL STATUS PRIOR TO ADMISSION: Patient was independent and active without use of DME.    HOME SUPPORT PRIOR TO ADMISSION: The patient lived with her son but did not require assist.    Physical Therapy Goals  Initiated 11/3/2021    1. Patient will move from supine to sit and sit to supine  and roll side to side in bed with independence within 4 days. 2. Patient will perform sit to stand with independence within 4 days. 3. Patient will ambulate with independence for 350 feet with the least restrictive device within 4 days. 4. Patient will ascend/descend 1 stairs with 1 handrail(s) with modified independence within 4 days. 5. Patient will verbalize and demonstrate understanding of spinal precautions (No bending, lifting greater than 5 lbs, or twisting; log-roll technique; frequent repositioning as instructed) within 4 days. 11/3/2021 1608 by Veronica Wadsworth  Outcome: Progressing Towards Goal   PHYSICAL THERAPY TREATMENT  Patient: Rayo Chao (83 y.o. female)  Date: 11/3/2021  Diagnosis: Lumbar stenosis [M48.061]   <principal problem not specified>  Procedure(s) (LRB):  L4-5 LAMINECTOMY AND MIDLINE FUSION ([facility]) (O-ARM) (N/A) 1 Day Post-Op  Precautions: Back No bending, no lifting greater than 5 lbs, no twisting, log-roll technique, repositioning every 20-30 min except when sleeping, brace when OOB (if ordered)  Chart, physical therapy assessment, plan of care and goals were reviewed. ASSESSMENT  Patient continues with skilled PT services and is progressing towards goals. Pt received resting in bed and agreeable to participate with therapy. Pt completed log roll technique with verbal cueing to avoid twisting. Pt able to recall 3 of 3 back precautions but occasionally needs reminding to avoid twisting while standing. Pt ambulated an increased ambulation distance with supervision. She occasionally holds onto rail or places her hand against the wall for support due to feeling a bit unsteady due to weakness. Pt cleared on 2 steps. Pt is on target for discharge tomorrow. Current Level of Function Impacting Discharge (mobility/balance): supervision, ambulates x 350 feet with supervision and occasionally reaching out for support due to self reported weakness     Other factors to consider for discharge: none         PLAN :  Patient continues to benefit from skilled intervention to address the above impairments. Continue treatment per established plan of care. to address goals. Recommendation for discharge: (in order for the patient to meet his/her long term goals)  No skilled physical therapy/ follow up rehabilitation needs identified at this time. This discharge recommendation:  Has been made in collaboration with the attending provider and/or case management    IF patient discharges home will need the following DME: patient owns DME required for discharge       SUBJECTIVE:   Patient stated [facility] [place] [facility] feels weak.     OBJECTIVE DATA SUMMARY:   Critical Behavior:  Neurologic State: Alert  Orientation Level: Oriented X4  Cognition: Appropriate decision making, Appropriate for age attention/concentration  Safety/Judgement: Fall prevention, Home safety    Spinal diagnosis intervention:  The patient stated 3/3 back precautions when prompted. Reviewed all 3 back precautions, log roll technique, and sitting for 30 minutes at a time. The patient required verbal cues to maintain back precautions during functional activity. Reviewed back brace application and wear schedule. Brace donned with supervision/set-up      Functional Mobility Training:    Bed Mobility:  Log Rolling: Supervision; Adaptive equipment; Additional time  Supine to Sit: Supervision; Additional time;  Adaptive equipment  Scooting: Independent        Transfers:  Sit to Stand: Modified independent  Stand to Sit: Modified independent                    Balance:  Sitting: Intact  Standing: Intact (occasionally holds onto rail or hand against wall for support, reports core and generalized weakness)  Ambulation/Gait Training:  Distance (ft): 350 Feet (ft)  Assistive Device: Brace/Splint; Gait belt  Ambulation - Level of Assistance: Supervision        Gait Abnormalities: Antalgic              Speed/Nataliya: Slow  Step Length: Right shortened; Left shortened                Pain Rating:  Verbal: 4  Location: back     Activity Tolerance:   Good    After treatment patient left in no apparent distress:   Sitting in chair and Call bell within reach    COMMUNICATION/COLLABORATION:   The patients plan of care was discussed with: Registered nurse.      Apple Caballero   Time Calculation: 26 mins

## 2021-11-03 NOTE — PROGRESS NOTES
OCCUPATIONAL THERAPY EVALUATION/DISCHARGE  Patient: Zenia Gonzales (60 y.o. female)  Date: 11/3/2021  Primary Diagnosis: Lumbar stenosis [M48.061]  Procedure(s) (LRB):  L4-5 LAMINECTOMY AND MIDLINE FUSION ([facility]ZOR) (O-ARM) (N/A) 1 Day Post-Op   Precautions:   Fall, Back    ASSESSMENT  Based on the objective data described below, the patient presents with good activity tolerance, demonstrated independence with UB/LB dressing/functional transfers, and good knowledge of back precautions after verbal/written education. Pt received supine in bed, dressed and agreeable to participation in therapy. Pt verbalized knowledge of back precautions, noting that she had been twisting some prior to the session to get dressed/perform basic ADLs. Reinforcement and review of precautions given verbally, handout provided, and demonstrated integration during toileting/in room mobility/functional transfers. Anticipate no further acute OT needs at this time. Recommend d/c home with family support and assist as needed for heavy ADL/IADL tasks and adherence to precautions. Current Level of Function (ADLs/self-care): mod I to independent inferred for all ADLs    Functional Outcome Measure: The patient scored 100 on the Barthel index outcome measure which is indicative of  [place] in basic self care. Other factors to consider for discharge: d/c to brother's home for assistance with heavy ADL/IADL tasks     PLAN :  Recommendation for discharge: (in order for the patient to meet his/her long term goals)  No skilled occupational therapy/ follow up rehabilitation needs identified at this time. This discharge recommendation:  Has been made in collaboration with the attending provider and/or case management    IF patient discharges home will need the following DME: none       SUBJECTIVE:   Patient stated I have been twisting before you came in, but I'll try not to now.     OBJECTIVE DATA SUMMARY:   HISTORY:   Past Medical History: Diagnosis Date    Anxiety     Arthritis     Cataract     Chronic obstructive pulmonary disease (HCC)     Chronic pain of right groin     after multiple complications from bladder sling that became imbedded    Depression     GERD (gastroesophageal reflux disease)     HTN (hypertension)     Infiltrating ductal carcinoma of overlapping sites of both breasts in female [facility])     Lumbar stenosis with neurogenic claudication 10/15/2021    Pneumonia     Spondylisthesis 10/15/2021    Thyroid disease     UTI (urinary tract infection)      Past Surgical History:   Procedure Laterality Date    HX ADENOIDECTOMY  1960    HX BLADDER SUSPENSION      Removed     HX BREAST LUMPECTOMY Right 2009    HX CATARACT REMOVAL Bilateral     HX HYSTERECTOMY  2015    HX LYMPHADENECTOMY      HX TONSILLECTOMY  1960    HX UROLOGICAL  2014    MESH ,SLING PLACED     HX UROLOGICAL  2016    REMOVAL OF SLING     HX UROLOGICAL  2014    CYSTOCCELE/ RECTOCELE     AL BIOPSY OF BREAST, INCISIONAL Bilateral        Prior Level of Function/Environment/Context:   Expanded or extensive additional review of patient history:     Home Situation  Home Environment: Private residence  # Steps to Enter: 6  Rails to Enter: Yes  Hand Rails : Bilateral  One/Two Story Residence: Two story, live on 1st floor  Living Alone: Yes (plans to stay with brother)  Support Systems: Other Family Member(s), Child(laila), Friend/Neighbor  Current DME Used/Available at Home: Walker, rolling (reports >8years old)  Tub or Shower Type: Tub/Shower combination      EXAMINATION OF PERFORMANCE DEFICITS:  Cognitive/Behavioral Status:  Neurologic State: Alert  Orientation Level: Oriented X4  Cognition: Appropriate for age attention/concentration; Follows commands  Perception: Appears intact  Perseveration: No perseveration noted  Safety/Judgement: Awareness of environment;  Fall prevention; Decreased awareness of need for safety     Skin: visually intact, pt reporting mild itchiness at incision site    Edema: none noted    Hearing: Auditory  Auditory Impairment: None    Vision/Perceptual:                           Acuity: Within Defined Limits         Range of Motion:    AROM: Within functional limits  PROM: Within functional limits                      Strength:    Strength: Within functional limits                Coordination:  Coordination: Within functional limits  Fine Motor Skills-Upper: Left Intact; Right Intact    Gross Motor Skills-Upper: Left Intact; Right Intact    Tone & Sensation:  Tone: Normal  Sensation: Impaired (reporting tingling in R foot)                      Balance:  Sitting: Intact  Standing: Intact    Functional Mobility and Transfers for ADLs:  Bed Mobility:  Rolling: Modified independent (logroll technique, min verbal cues)  Supine to Sit: Modified independent; Additional time    Transfers:  Sit to Stand: Independent; Additional time  Stand to Sit: Independent; Additional time  Bed to Chair: Independent; Additional time  Bathroom Mobility: Modified independent  Toilet Transfer : Modified independent (cues for use of grab bars and to adhere to precautions)    ADL Assessment:  Feeding: Independent    Oral Facial Hygiene/Grooming: Independent    Bathing: Independent (seated, inferred)    Upper Body Dressing: Independent    Lower Body Dressing: Independent (compensatory technique)    Toileting: Modified independent; Additional time                ADL Intervention and task modifications: Toileting  Toileting Assistance: Modified independent  Cues: Verbal cues provided (for use of grab bars and adherance to precuations)  Adaptive Equipment: Grab bars; Elevated seat    Cognitive Retraining  Safety/Judgement: Awareness of environment; Fall prevention; Decreased awareness of need for safety    Patient instructed and demonstrated 3/3 spine precautions with moderate verbal cues.      Patient instructed and indicated understanding the benefits of maintaining activity tolerance, functional mobility, and independence with self care tasks during acute stay  to ensure safe return home and to baseline. Encouraged patient to increase frequency and duration OOB, not sitting longer than 30 mins without marching/walking with staff, be out of bed for all meals, perform daily ADLs (as approved by RN/MD regarding bathing etc), and performing functional mobility to/from bathroom. Patient instruction and indicated understanding on body mechanics, ergonomics and gravitational force on the spine during different body positions to plan activities in prep for return home to complete basic ADLs, instrumental ADLs and back to work safely. Patient instructed and demonstrated while supine hip ER stretch and hold 10 seconds to increase ROM in prep for lower body ADLs daily with independence. Bathing: Patient instructed and indicated understanding when bathing to not submerge wound in water, stand to shower or sponge bathe, cover wound with plastic and tape to ensure no water reaches bandage/wound without cues. Dressing brace: Patient instructed and demonstrated to don/doff velcro on brace using dominant side, keeping non-dominant side intact. Patient instructed and demonstrated in meantime of being able to stand with back against wall to don/doff brace, to don/doff seated using lap and bed/chair surface to support brace while manipulating. Dressing lower body: Patient instructed to don brace first and on the benefits to remain seated to don all clothing to increase independence with precautions and pain management. Patient instructed and demonstrated tailor sitting for lower body dressing with independence. Toileting: Patient instructed on the benefits of using flushable wet wipes and toilet tongs if decreased reach or pain for anastacia care. Also, the benefits of a reacher to aid in clothing management.      Patient instruction and indicated understanding to not strain i.e. holding breath to bear down during a bowel movement, lifting/activity, and sexual activity. Home safety: Patient instructed and indicated understanding on home modifications and safety [raise height of ADL objects (i.e. clothing, sink items, fridge items, items to mouth when grooming), appropriate height of chair surfaces, recliner safety, change of floor surfaces, clear pathways] to increase independence and fall prevention. Standing: Patient instructed and indicated understanding to walk up to sink/counter top/surfaces, step into walker, square off while using objects, slide objects along surfaces, to increase adherence to back precautions and fall prevention. Patient instructed to increase amount of time standing in order to increase independence and tolerance with ADLs. During prolonged standing, can open cabinet door or place foot on stool to decrease spinal pressure/increase pain. Tub transfer: Patient instructed and indicated understanding regarding when it is safe to begin transfer into tub (complete stairs with PT, advance exercises with PT high enough to clear tub height, and while clothes donned practice with another person present). Functional Measure:    Barthel Index:  Bathin  Bladder: 10  Bowels: 10  Groomin  Dressing: 10  Feeding: 10  Mobility: 15  Stairs: 10  Toilet Use: 10  Transfer (Bed to Chair and Back): 15  Total: 100/100      The Barthel ADL Index: Guidelines  1. The index should be used as a record of what a patient does, not as a record of what a patient could do. 2. The main aim is to establish degree of independence from any help, physical or verbal, however minor and for whatever reason. 3. The need for supervision renders the patient not independent. 4. A patient's performance should be established using the best available evidence.  Asking the patient, friends/relatives and nurses are the usual sources, but direct observation and common sense are also important. However direct testing is not needed. 5. Usually the patient's performance over the preceding 24-48 hours is important, but occasionally longer periods will be relevant. 6. Middle categories imply that the patient supplies over 50 per cent of the effort. 7. Use of aids to be independent is allowed. Score Interpretation (from [facility] 83)    Independent   60-79 Minimally independent   40-59 Partially dependent   20-39 Very dependent   <20 Totally dependent     -Pedro Pang., Barthel, DSebW. (1965). Functional evaluation: the Barthel Index. [address] ([address] (1997). The Barthel activities of daily living index: self-reporting versus actual performance in the old (> or = 75 years). Journal [address] 45(7), 14 [facility], J.GENOVEVA, Michael Hernandez., Jia Rutherford. (1999). Measuring the change in disability after inpatient rehabilitation; comparison of the responsiveness of the Barthel Index and Functional [place]linger Measure. Journal of Neurology, Neurosurgery, and Psychiatry, 66(4), 318-929. Bertha Samuels, N.J.A, ANNEL Ortega, & Sarah Iverson, MSebA. (2004) Assessment of post-stroke quality of life in cost-effectiveness studies: The usefulness of the Barthel Index and the EuroQoL-5D. Quality of Life Research, 15, 249-39       Occupational Therapy Evaluation Charge Determination   History Examination Decision-Making   LOW Complexity : Brief history review  LOW Complexity : 1-3 performance deficits relating to physical, cognitive , or psychosocial skils that result in activity limitations and / or participation restrictions  MEDIUM Complexity : Patient may present with comorbidities that affect occupational performnce.  Miniml to moderate modification of tasks or assistance (eg, physical or verbal ) with assesment(s) is necessary to enable patient to complete evaluation       Based on the above components, the patient evaluation is determined to be of the following complexity level: LOW   Pain Rating:  Patient reporting no pain throughout session    Activity Tolerance:   Good    After treatment patient left in no apparent distress:    Sitting in chair and Call bell within reach    COMMUNICATION/EDUCATION:   The patients plan of care was discussed with: Registered nurse and Case management.      Thank you for this referral.  Lori Peck, OT  Time Calculation: 28 mins

## 2021-11-04 VITALS
WEIGHT: 161 LBS | DIASTOLIC BLOOD PRESSURE: 70 MMHG | BODY MASS INDEX: 25.88 KG/M2 | TEMPERATURE: 98.3 F | OXYGEN SATURATION: 90 % | SYSTOLIC BLOOD PRESSURE: 126 MMHG | HEIGHT: 66 IN | RESPIRATION RATE: 14 BRPM | HEART RATE: 76 BPM

## 2021-11-04 LAB — HGB BLD-MCNC: 11.4 G/DL (ref 11.5–16)

## 2021-11-04 PROCEDURE — 74011250637 HC RX REV CODE- 250/637: Performed by: STUDENT IN AN ORGANIZED HEALTH CARE EDUCATION/TRAINING PROGRAM

## 2021-11-04 PROCEDURE — 74011250637 HC RX REV CODE- 250/637: Performed by: PHYSICIAN ASSISTANT

## 2021-11-04 PROCEDURE — 85018 HEMOGLOBIN: CPT

## 2021-11-04 PROCEDURE — 36415 COLL VENOUS BLD VENIPUNCTURE: CPT

## 2021-11-04 PROCEDURE — 97116 GAIT TRAINING THERAPY: CPT

## 2021-11-04 RX ORDER — NALOXONE HYDROCHLORIDE 4 MG/.1ML
SPRAY NASAL
Qty: 1 EACH | Refills: 0 | Status: SHIPPED | OUTPATIENT
Start: 2021-11-04 | End: 2021-12-21 | Stop reason: ALTCHOICE

## 2021-11-04 RX ORDER — HYDROMORPHONE HYDROCHLORIDE 2 MG/1
2-4 TABLET ORAL
Qty: 60 TABLET | Refills: 0 | Status: SHIPPED | OUTPATIENT
Start: 2021-11-04 | End: 2021-11-11

## 2021-11-04 RX ADMIN — HYDROCHLOROTHIAZIDE 12.5 MG: 25 TABLET ORAL at 08:35

## 2021-11-04 RX ADMIN — Medication 1 TABLET: at 08:35

## 2021-11-04 RX ADMIN — ASPIRIN 81 MG CHEWABLE TABLET 81 MG: 81 TABLET CHEWABLE at 08:34

## 2021-11-04 RX ADMIN — LEVOTHYROXINE SODIUM 175 MCG: 0.15 TABLET ORAL at 06:33

## 2021-11-04 RX ADMIN — MULTIPLE VITAMINS W/ MINERALS TAB 1 TABLET: TAB at 08:35

## 2021-11-04 RX ADMIN — LISINOPRIL 20 MG: 20 TABLET ORAL at 08:35

## 2021-11-04 RX ADMIN — HYDROMORPHONE HYDROCHLORIDE 4 MG: 4 TABLET ORAL at 02:33

## 2021-11-04 RX ADMIN — HYDROMORPHONE HYDROCHLORIDE 4 MG: 4 TABLET ORAL at 10:59

## 2021-11-04 RX ADMIN — DOCUSATE SODIUM 50 MG AND SENNOSIDES 8.6 MG 1 TABLET: 8.6; 5 TABLET, FILM COATED ORAL at 08:34

## 2021-11-04 RX ADMIN — ACETAMINOPHEN 1000 MG: 500 TABLET ORAL at 12:42

## 2021-11-04 RX ADMIN — HYDROMORPHONE HYDROCHLORIDE 4 MG: 4 TABLET ORAL at 06:33

## 2021-11-04 RX ADMIN — ACETAMINOPHEN 1000 MG: 500 TABLET ORAL at 00:34

## 2021-11-04 RX ADMIN — POLYETHYLENE GLYCOL 3350 17 G: 17 POWDER, FOR SOLUTION ORAL at 08:35

## 2021-11-04 RX ADMIN — ACETAMINOPHEN 1000 MG: 500 TABLET ORAL at 06:33

## 2021-11-04 RX ADMIN — Medication 1000 UNITS: at 08:34

## 2021-11-04 NOTE — PROGRESS NOTES
Orthopedic Spine Progress Note  Post Op day: 2 Days Post-Op    2021 9:58 AM   Admit Date: 2021  Procedure: Procedure(s):  L4-5 LAMINECTOMY AND MIDLINE FUSION ([facility]) (O-ARM)    Subjective: Pebbles Ronquillo has complaints of some increased low back pain overnight. Pain is better controlled this morning. No leg pain. Tolerating diet. No N/V. Pain Control:   Pain Assessment  Pain Scale 1: Numeric (0 - 10)  Pain Intensity 1: 5  Pain Onset 1: postop  Pain Location 1: Back  Pain Orientation 1: Lower  Pain Description 1: Aching  Pain Intervention(s) 1: Medication (see MAR)    Objective:          Physical Exam:  General:  Alert and oriented. No acute distress. Heart:  Respirations unlabored. Abdomen:   Extremities: Soft, non-tender. No evidence of cyanosis. Pulses palpable in both upper and lower extremities. Neurologic:  Musculoskeletal:  No new motor deficits. Neurovascular exam within normal limits. Sensation stable. Motor: unchanged C5-T1 and L2-S1. Sally's sign negative in bilateral lower extremities. Calves soft, nontender upon palpation and with passive twitch. Moves both upper and lower extremities. Incision: clean, dry, and intact. No significant erythema or swelling. No active drainage noted. Vital Signs:    Blood pressure 126/70, pulse 76, temperature 98.3 °F (36.8 °C), resp. rate 14, height 5' 5.5\" (1.664 m), weight 73 kg (161 lb), SpO2 90 %. Temp (24hrs), Av.1 °F (36.7 °C), Min:97.7 °F (36.5 °C), Max:98.5 °F (36.9 °C)      LAB:    Recent Labs     21  0240   HGB 11.4*     Lab Results   Component Value Date/Time    Sodium 136 2021 01:22 AM    Potassium 3.8 2021 01:22 AM    Chloride 104 2021 01:22 AM    CO2 29 2021 01:22 AM    Glucose 127 (H) 2021 01:22 AM    BUN 17 2021 01:22 AM    Creatinine 0.77 2021 01:22 AM    Calcium 9.5 2021 01:22 AM       Intake/Output:No intake/output data recorded.   No intake/output data recorded. PT/OT:   Gait:  Gait  Speed/Nataliya: Slow  Step Length: Right shortened, Left shortened  Gait Abnormalities: Antalgic  Ambulation - Level of Assistance: Supervision  Distance (ft): 350 Feet (ft)  Assistive Device: Brace/Splint, Gait belt                 Assessment:   Patient is 2 Days Post-Op s/p Procedure(s):  L4-5 LAMINECTOMY AND MIDLINE FUSION ([facility]) (O-ARM)    Plan:     1. Continue PT/OT  2. Continue established methods of pain control  3. VTE Prophylaxes - TEDS &/or SCDs   4. Advance diet  5.   Discharge pending clearance from PT; likely today        Signed By: ARIAN No

## 2021-11-04 NOTE — PROGRESS NOTES
Transition of Care Plan   RUR- Low 3%    DISPOSITION: The disposition plan is home with family assistance  o The pt does not have any skilled needs at this time   o The pt reported that she will purchase a RW from [employer] F/U with PCP/Specialist     Transport: Family    Medicare pt has received, reviewed, and signed 2nd IM letter informing them of their right to appeal the discharge. Signed copy has been placed on pt bedside chart.   CM: [address]. MSW,   [phone]

## 2021-11-04 NOTE — PROGRESS NOTES
Physical Therapy 11/4/2021    Pt seen by PT (performed stairs), cleared for home. Full note to follow. Thank you.   Anum Avina, PT, DPT

## 2021-11-04 NOTE — PROGRESS NOTES
Problem: Mobility Impaired (Adult and Pediatric)  Goal: *Acute Goals and Plan of Care (Insert Text)  Description: FUNCTIONAL STATUS PRIOR TO ADMISSION: Patient was independent and active without use of DME.    HOME SUPPORT PRIOR TO ADMISSION: The patient lived with her son but did not require assist.    Physical Therapy Goals  Initiated 11/3/2021    1. Patient will move from supine to sit and sit to supine  and roll side to side in bed with independence within 4 days. 2. Patient will perform sit to stand with independence within 4 days. 3. Patient will ambulate with independence for 350 feet with the least restrictive device within 4 days. 4. Patient will ascend/descend 1 stairs with 1 handrail(s) with modified independence within 4 days. 5. Patient will verbalize and demonstrate understanding of spinal precautions (No bending, lifting greater than 5 lbs, or twisting; log-roll technique; frequent repositioning as instructed) within 4 days. Outcome: Progressing Towards Goal   PHYSICAL THERAPY TREATMENT  Patient: Sierra Felton (3 y.o. female)  Date: 11/4/2021  Diagnosis: Lumbar stenosis [M48.061]   <principal problem not specified>  Procedure(s) (LRB):  L4-5 LAMINECTOMY AND MIDLINE FUSION (MAZOR) (O-ARM) (N/A) 2 Days Post-Op  Precautions: Back No bending, no lifting greater than 5 lbs, no twisting, log-roll technique, repositioning every 20-30 min except when sleeping, brace when OOB (if ordered)  Chart, physical therapy assessment, plan of care and goals were reviewed. ASSESSMENT  Patient continues with skilled PT services and is progressing towards goals. Patient received ambulating from bathroom without AD. Pt appeared fairly steady, donned brace with minimal cues. Pt ambulated in hallway with fair steadiness requiring intermittent use of wall for support. Pt declined use of walker yet would like one at home.  Educated pt walker would assist with balance as pt reaching out for support while ambulating. Pt cleared 2 steps without difficulty then returned to room. Pt demonstrated good carryover of log roll technique. Pt cleared by PT, reports no concerns and anticipate HHPT upon discharge. MD aware. Current Level of Function Impacting Discharge (mobility/balance): supervision stairs    Other factors to consider for discharge: back prec         PLAN :  Patient continues to benefit from skilled intervention to address the above impairments. Continue treatment per established plan of care. to address goals. Recommendation for discharge: (in order for the patient to meet his/her long term goals)  Physical therapy at least 2 days/week in the home     This discharge recommendation:  Has been made in collaboration with the attending provider and/or case management    IF patient discharges home will need the following DME: wheelchair and order placed to , also discussed other options with pt       SUBJECTIVE:   Patient stated I think I am good.     OBJECTIVE DATA SUMMARY:   Critical Behavior:  Neurologic State: Alert  Orientation Level: Oriented X4  Cognition: Appropriate decision making, Appropriate for age attention/concentration, Appropriate safety awareness, Follows commands  Safety/Judgement: Fall prevention, Home safety    Spinal diagnosis intervention:  The patient stated 3/3 back precautions when prompted. Reviewed all 3 back precautions, log roll technique, and sitting for 30 minutes at a time. The patient required minimal cues to maintain back precautions during functional activity. Reviewed back brace application and wear schedule.  Brace donned with supervision/set-up and cues    Functional Mobility Training:    Bed Mobility:  Log    Supine to Sit: Modified independent              Transfers:  Sit to Stand: Modified independent  Stand to Sit: Modified independent                             Balance:  Sitting: Intact  Standing: Intact  Ambulation/Gait Training:  Distance (ft): 200 Feet (ft)     Ambulation - Level of Assistance: Modified independent        Gait Abnormalities: Decreased step clearance        Base of Support: Narrowed          Stairs:  Number of Stairs Trained: 2  Stairs - Level of Assistance: Supervision   Rail Use: Right     Activity Tolerance:   Good    After treatment patient left in no apparent distress:   Supine in bed and Call bell within reach    COMMUNICATION/COLLABORATION:   The patients plan of care was discussed with: Registered nurse, Physician, and Case management.      Jamshid Faustin, PT   Time Calculation: 12 mins

## 2021-11-09 NOTE — DISCHARGE SUMMARY
Procedure(s):  L4-5 LAMINECTOMY AND MIDLINE FUSION (PATRICK) (O-ARM) Operative Report      Date of Surgery: 11/2/2021     Preoperative Diagnosis:  SPONDYLOLISTHESIS STENOSIS    Postoperative Diagnosis: SPONDYLOLISTHESIS STENOSIS    Procedure: Procedure(s):  L4-5 LAMINECTOMY AND MIDLINE FUSION (Javad Goyal) (O-ARM)     Surgeon: Dr. Herminio Gregorio    History and Hospital Course: Patricia Blanco is a pleasant 74y.o. year old female who has complaints of low back pain. Diagnostic testing found stenosis and spondylolisthesis. Having failed conservative treatment, the patient elected to undergo operative intervention. She tolerated the procedure well and was admitted post-operatively for antibiotics and pain control. On post-op day 1, the patient was doing well. She had some complaints of lower back pain and mild right leg numbness. She was started on a clear liquid diet. She was allowed to dangle on the edge of the bed and walk with physical therapy. On post-op day 2, the patient continued to progress well. She was started on a regular diet. She continued to progress well with physical therapy. She was deemed ready for discharge. She was discharged to home. She was tolerating a regular diet and pain was well controlled with oral pain medications. The patient was discharged with prescriptions for pain medications. She was instructed to wear her brace at all times when out of bed. She was instructed to limit her bending, lifting and twisting. The patient will follow-up in 10-14 days for repeat x-rays and a wound check.       Signed By: ARIAN Álvarez

## 2021-11-18 ENCOUNTER — OFFICE VISIT (OUTPATIENT)
Dept: ORTHOPEDIC SURGERY | Age: 66
End: 2021-11-18
Payer: MEDICARE

## 2021-11-18 VITALS — HEIGHT: 66 IN | WEIGHT: 161 LBS | BODY MASS INDEX: 25.88 KG/M2

## 2021-11-18 DIAGNOSIS — Z98.1 S/P LUMBAR FUSION: Primary | ICD-10-CM

## 2021-11-18 DIAGNOSIS — M48.062 LUMBAR STENOSIS WITH NEUROGENIC CLAUDICATION: ICD-10-CM

## 2021-11-18 PROCEDURE — 99024 POSTOP FOLLOW-UP VISIT: CPT | Performed by: STUDENT IN AN ORGANIZED HEALTH CARE EDUCATION/TRAINING PROGRAM

## 2021-11-18 RX ORDER — CYCLOBENZAPRINE HCL 10 MG
10 TABLET ORAL
Qty: 30 TABLET | Refills: 0 | Status: SHIPPED | OUTPATIENT
Start: 2021-11-18 | End: 2022-04-26 | Stop reason: ALTCHOICE

## 2021-11-18 RX ORDER — CEPHALEXIN 500 MG/1
500 CAPSULE ORAL 4 TIMES DAILY
Qty: 40 CAPSULE | Refills: 0 | Status: SHIPPED | OUTPATIENT
Start: 2021-11-18 | End: 2021-11-28

## 2021-11-18 RX ORDER — HYDROMORPHONE HYDROCHLORIDE 2 MG/1
2 TABLET ORAL
Qty: 50 TABLET | Refills: 0 | Status: SHIPPED | OUTPATIENT
Start: 2021-11-18 | End: 2021-12-02

## 2021-11-23 NOTE — PROGRESS NOTES
Juan Badillo (: 1955) is a 74 y.o. female, patient, here for evaluation of the following chief complaint(s):  Back Pain and Surgical Follow-up (Sx 2021 - L4/5 Lami/Fusion (PO #1))       ASSESSMENT/PLAN:  Below is the assessment and plan developed based on review of pertinent history, physical exam, labs, studies, and medications. Patient presents today approximately 2 weeks status post L4-5 lumbar laminectomy and fusion. I am happy with her progress thus far. She does have some residual right foot and leg numbness as well as intermittent shooting pains down the posterior lateral aspect of her right leg. Discussed with her that this is normal at this time postoperatively. She does report mild to moderate amount of serosanguineous drainage from her incision, which is slowly decreasing. Denies fevers or chills. Antibiotic prescribed as noted below. She is currently taking 1  2 mg tablet of Dilaudid every 4-6 hours.  checked. Refill provided. Muscle relaxer prescribed also. She will remain in her [facility] until her next visit. Informed the patient to call the office with any worsening of symptoms. She will follow up in 4 weeks. 1. S/P lumbar fusion  -     XR SPINE LUMB 2 OR 3 V; Future  -     HYDROmorphone (Dilaudid) 2 mg tablet; Take 1 Tablet by mouth every four to six (4-6) hours as needed for Pain for up to 14 days. Max Daily Amount: 12 mg., Normal, Disp-50 Tablet, R-0  2. Lumbar stenosis with neurogenic claudication      Return in about 4 weeks (around 2021). SUBJECTIVE/OBJECTIVE:  Juan Badillo (: 1955) is a 74 y.o. female. No flowsheet data found. Patient presents today approximately 2 weeks status post L4-5 lumbar laminectomy and fusion. She is doing well overall. She reports some residual right foot and leg numbness as well as intermittent shooting pains.   She had an incident a few days ago where she kicked her foot at her dog and had a shooting pain from her back radiating down the posterolateral aspect of her right leg. This is improved compared to prior to surgery. She is currently taking 100 mg tablet of Dilaudid every 4-6 hours. She does report moderate amount of drainage coming from her incision, but states this is slowly decreasing. Denies fevers or chills. She has been compliant with wearing her Amedeo Seip, \"most of the time. \"  She reports she has been compliant with her bending, lifting and twisting restrictions. Imaging:    XR Results (most recent):  Results from Appointment encounter on 11/18/21    XR SPINE LUMB 2 OR 3 V    Narrative  AP and lateral views of the lumbar spine reveal stable L4-5 fusion. No acute fractures, lytic lesions or gross subluxations noted. No evidence of subsidence or hardware failure. MRI Results (most recent):  No results found for this or any previous visit. Allergies   Allergen Reactions    Morphine Nausea Only    Nitrofurantoin Hives    Sulfa (Sulfonamide Antibiotics) Nausea Only and Unknown (comments)    Macrobid [Nitrofurantoin Monohyd/M-Cryst] Hives       Current Outpatient Medications   Medication Sig    HYDROmorphone (Dilaudid) 2 mg tablet Take 1 Tablet by mouth every four to six (4-6) hours as needed for Pain for up to 14 days. Max Daily Amount: 12 mg.  cephALEXin (KEFLEX) 500 mg capsule Take 1 Capsule by mouth four (4) times daily for 10 days.  cyclobenzaprine (FLEXERIL) 10 mg tablet Take 1 Tablet by mouth three (3) times daily as needed for Muscle Spasm(s).  naloxone (Narcan) 4 mg/actuation nasal spray Use 1 spray intranasally, then discard. Repeat with new spray every 2 min as needed for opioid overdose symptoms, alternating nostrils.  albuterol (PROVENTIL HFA, VENTOLIN HFA, PROAIR HFA) 90 mcg/actuation inhaler Take 2 Puffs by inhalation every six (6) hours as needed for Wheezing.  multivitamin (ONE A DAY) tablet Take 1 Tablet by mouth daily.     b complex vitamins tablet Take 1 Tablet by mouth daily.  cholecalciferol (Vitamin D3) (1000 Units /25 mcg) tablet Take  by mouth daily.  levothyroxine (SYNTHROID) 175 mcg tablet Take 1 Tablet by mouth Daily (before breakfast).  lisinopriL (PRINIVIL, ZESTRIL) 20 mg tablet Take 1 Tablet by mouth daily. Indications: high blood pressure    hydroCHLOROthiazide (HYDRODIURIL) 12.5 mg tablet Take 1 Tablet by mouth daily. Indications: high blood pressure    melatonin 5 mg tablet Take  by mouth nightly. Takes 2 at bedtime    aspirin 81 mg chewable tablet Take 81 mg by mouth daily. No current facility-administered medications for this visit.        Past Medical History:   Diagnosis Date    Anxiety     Arthritis     Cataract     Chronic obstructive pulmonary disease (HCC)     Chronic pain of right groin     after multiple complications from bladder sling that became imbedded    Depression     GERD (gastroesophageal reflux disease)     HTN (hypertension)     Infiltrating ductal [facility] of overlapping sites of both breasts in [facility])     Lumbar stenosis with neurogenic claudication 10/15/2021    Pneumonia     Spondylisthesis 10/15/2021    Thyroid disease     UTI (urinary tract infection)         Past Surgical History:   Procedure Laterality Date    HX ADENOIDECTOMY  1960    HX BLADDER SUSPENSION      Removed     HX BREAST LUMPECTOMY Right 2009    HX CATARACT REMOVAL Bilateral     HX HYSTERECTOMY  2015    HX LYMPHADENECTOMY      HX TONSILLECTOMY  1960    HX UROLOGICAL  2014    [address] ,SLING PLACED     HX UROLOGICAL  2016    REMOVAL OF SLING     HX UROLOGICAL  2014    CYSTOCCELE/ RECTOCELE     [place] BIOPSY OF BREAST, INCISIONAL Bilateral        Family History   Problem Relation Age of Onset    Cancer Mother         BREAST     Alcohol abuse Father     Anesth Problems Neg Hx         Social History     Tobacco Use    Smoking status: Current Every Day Smoker     Packs/day: 1.00     Years: 48.00 Pack years: 48.00     Types: Cigarettes    Smokeless tobacco: Never Used   Vaping Use    Vaping Use: Former    Substances: CBD, [address] [place] 2018    Devices: Pre-filled or refillable cartridge   Substance Use Topics    Alcohol use: Yes     Comment: rarely    Drug use: Yes     Types: Marijuana, Prescription     Comment: nightly(pain relief)        Review of Systems   Constitutional: Negative for chills and fever. Respiratory: Negative for cough, chest tightness and shortness of breath. Cardiovascular: Negative for chest pain and palpitations. Gastrointestinal: Negative for nausea and vomiting. Genitourinary: Negative for difficulty urinating and dysuria. Musculoskeletal: Positive for back pain. Negative for arthralgias, gait problem, neck pain and neck stiffness. Neurological: Negative for weakness, light-headedness, numbness and headaches. Psychiatric/Behavioral: Negative for confusion and hallucinations. The patient is not hyperactive. Vitals:  Ht 5' 6\" (1.676 m)   Wt 161 lb (73 kg)   BMI 25.99 kg/m²    Body mass index is 25.99 kg/m². Physical Exam  Integumentary  Assessment of Surgical Incision - healing and consistent with normal anticipated wound healing. Wound edges well approximated. Nonfluctuant. Nontender to palpation. Moderate amount of serosanguineous drainage noted on dressing emanating from inferior pole of the incision. Neurologic  Sensory  Light Touch - Intact - Globally. Overall Assessment of Muscle Strength and Tone reveals  Lower Extremities - Right Iliopsoas - 5/5. Left Iliopsoas - 5/5. Right Tibialis Anterior - 5/5. Left Tibialis Anterior - 5/5. Right Gastroc-Soleus - 5/5. Left Gastroc-Soleus - 5/5. Right EHL - 5/5. Left EHL - 5/5. General Assessment of Reflexes  Right Ankle - Clonus is not present. Left Ankle - Clonus is not present.   Reflexes (Dermatomes)  2/2 Normal - Left Achilles (L5-S2), Left Knee (L2-4), Right Achilles (L5-S2) and Right Knee (L2-4). Musculoskeletal  Global Assessment  Examination of related systems reveals - well-developed, well-nourished, in no acute distress, alert and oriented x 3 and normal coordination. Spine/Ribs/Pelvis  Lumbosacral Spine - Examination of the lumbosacral spine reveals - no known fractures or deformities. Inspection and Palpation - Tenderness - moderate. Assessment of pain reveals the following findings - The pain is characterized as - moderate. Location - pain refers to lower back bilaterally. Dr. Behzad Coburn was available for immediate consult during this encounter. An electronic signature was used to authenticate this note.   -- ARIAN Mathew

## 2021-12-13 ENCOUNTER — HOSPITAL ENCOUNTER (OUTPATIENT)
Dept: MAMMOGRAPHY | Age: 66
Discharge: HOME | End: 2021-12-13
Attending: NURSE PRACTITIONER
Payer: MEDICARE

## 2021-12-13 DIAGNOSIS — C50.411 MALIGNANT NEOPLASM OF UPPER-OUTER QUADRANT OF RIGHT FEMALE BREAST, UNSPECIFIED ESTROGEN RECEPTOR STATUS (HCC): ICD-10-CM

## 2021-12-13 DIAGNOSIS — Z12.31 ENCOUNTER FOR SCREENING MAMMOGRAM FOR MALIGNANT NEOPLASM OF BREAST: ICD-10-CM

## 2021-12-13 PROCEDURE — 77063 BREAST TOMOSYNTHESIS BI: CPT

## 2021-12-21 ENCOUNTER — OFFICE VISIT (OUTPATIENT)
Dept: ORTHOPEDIC SURGERY | Age: 66
End: 2021-12-21
Payer: MEDICARE

## 2021-12-21 DIAGNOSIS — Z98.1 S/P LUMBAR FUSION: Primary | ICD-10-CM

## 2021-12-21 PROCEDURE — 99024 POSTOP FOLLOW-UP VISIT: CPT | Performed by: ORTHOPAEDIC SURGERY

## 2021-12-22 NOTE — PROGRESS NOTES
Yashira Clements (: 1955) is a 74 y.o. female, patient, here for evaluation of the following chief complaint(s):  Surgical Follow-up       ASSESSMENT/PLAN:    Below is the assessment and plan developed based on review of pertinent history, physical exam, labs, studies, and medications. At this point she is stable postoperatively. She will increase activities. She will wean out of the lumbar corset. To continue to wean medications. We will see her back in 6 weeks to start formal physical therapy. 1. S/P lumbar fusion  -     XR SPINE LUMB 2 OR 3 V; Future      Return in about 6 weeks (around 2022). SUBJECTIVE/OBJECTIVE:  Yashira Clements (: 1955) is a 74 y.o. female. No flowsheet data found. There for 6-week follow-up. No acute findings are noted. Back pain is expected but is improving. She denies any bowel bladder difficulties. Imaging:          XR Results (most recent):  Results from Appointment encounter on 21    XR SPINE LUMB 2 OR 3 V    Narrative  AP and lateral of the lumbar spine demonstrates a stable L4-5 midline fusion. No acute changes       MRI Results (most recent):  No results found for this or any previous visit. Allergies   Allergen Reactions    Morphine Nausea Only    Nitrofurantoin Hives    Sulfa (Sulfonamide Antibiotics) Nausea Only and Unknown (comments)    Macrobid [Nitrofurantoin Monohyd/M-Cryst] Hives       Current Outpatient Medications   Medication Sig    cyclobenzaprine (FLEXERIL) 10 mg tablet Take 1 Tablet by mouth three (3) times daily as needed for Muscle Spasm(s).  albuterol (PROVENTIL HFA, VENTOLIN HFA, PROAIR HFA) 90 mcg/actuation inhaler Take 2 Puffs by inhalation every six (6) hours as needed for Wheezing.  multivitamin (ONE A DAY) tablet Take 1 Tablet by mouth daily.  b complex vitamins tablet Take 1 Tablet by mouth daily.  cholecalciferol (Vitamin D3) (1000 Units /25 mcg) tablet Take  by mouth daily.     levothyroxine (SYNTHROID) 175 mcg tablet Take 1 Tablet by mouth Daily (before breakfast).  lisinopriL (PRINIVIL, ZESTRIL) 20 mg tablet Take 1 Tablet by mouth daily. Indications: high blood pressure    hydroCHLOROthiazide (HYDRODIURIL) 12.5 mg tablet Take 1 Tablet by mouth daily. Indications: high blood pressure    melatonin 5 mg tablet Take  by mouth nightly. Takes 2 at bedtime    aspirin 81 mg chewable tablet Take 81 mg by mouth daily. No current facility-administered medications for this visit.        Past Medical History:   Diagnosis Date    Anxiety     Arthritis     Cataract     Chronic obstructive pulmonary disease (HCC)     Chronic pain of right groin     after multiple complications from bladder sling that became imbedded    Depression     GERD (gastroesophageal reflux disease)     HTN (hypertension)     Lobular carcinoma in situ of breast 2009    right breast bx LCIS    Lumbar stenosis with neurogenic claudication 10/15/2021    Pneumonia     Spondylisthesis 10/15/2021    Thyroid disease     UTI (urinary tract infection)         Past Surgical History:   Procedure Laterality Date    HX ADENOIDECTOMY  1960    HX BLADDER SUSPENSION      Removed     HX BREAST BIOPSY Right 2009    LCIS    HX BREAST BIOPSY Left 2000's    punch bx benign had to be put under    HX CATARACT REMOVAL Bilateral     HX HYSTERECTOMY  2015    HX LYMPHADENECTOMY      HX TONSILLECTOMY  1960    HX UROLOGICAL  2014    [facility] ,SLING PLACED     HX UROLOGICAL  2016    REMOVAL OF SLING     HX UROLOGICAL  2014    CYSTOCCELE/ RECTOCELE     [place] BIOPSY OF BREAST, INCISIONAL Bilateral        Family History   Problem Relation Age of Onset    Cancer Mother         BREAST     Breast Cancer Mother         dx age 48   [facility] Alcohol abuse Father     Breast Cancer Sister         dx age 48   [facility] Anesth Problems Neg Hx         Social History     Tobacco Use    Smoking status: Current Every Day Smoker     Packs/day: 1.00     Years: 48.00     Pack years: 48.00     Types: Cigarettes    Smokeless tobacco: Never Used   Vaping Use    Vaping Use: Former    Substances: CBD, [address] [place] 2018    Devices: Pre-filled or refillable cartridge   Substance Use Topics    Alcohol use: Yes     Comment: rarely    Drug use: Yes     Types: Marijuana, Prescription     Comment: nightly(pain relief)        Review of Systems       Vitals: There were no vitals taken for this visit. There is no height or weight on file to calculate BMI. Ortho Exam       Integumentary  Assessment of Surgical Incision - healing and consistent with normal anticipated wound healing. Neurologic  Sensory  Light Touch - Intact - Globally. Overall Assessment of Muscle Strength and Tone reveals  Lower Extremities - Right Iliopsoas - 5/5. Left Iliopsoas - 5/5. Right Tibialis Anterior - 5/5. Left Tibialis Anterior - 5/5. Right Gastroc-Soleus - 5/5. Left Gastroc-Soleus - 5/5. Right EHL - 5/5. Left EHL - 5/5. General Assessment of Reflexes  Right Ankle - Clonus is not present. Left Ankle - Clonus is not present. Reflexes (Dermatomes)  2/2 Normal - Left Achilles (L5-S2), Left Knee (L2-4), Right Achilles (L5-S2) and Right Knee (L2-4). Musculoskeletal  Global Assessment  Examination of related systems reveals - well-developed, well-nourished, in no acute distress, alert and oriented x 3 and normal coordination. Spine/Ribs/Pelvis  Lumbosacral Spine - Examination of the lumbosacral spine reveals - no known fractures or deformities. Inspection and Palpation - Tenderness - moderate. Assessment of pain reveals the following findings - The pain is characterized as - moderate. Location - pain refers to lower back bilaterally. An electronic signature was used to authenticate this note.   -- Mary Bocanegra MD

## 2022-02-01 ENCOUNTER — OFFICE VISIT (OUTPATIENT)
Dept: ORTHOPEDIC SURGERY | Age: 67
End: 2022-02-01
Payer: MEDICARE

## 2022-02-01 VITALS — WEIGHT: 163 LBS | BODY MASS INDEX: 26.2 KG/M2 | HEIGHT: 66 IN

## 2022-02-01 DIAGNOSIS — Z98.1 S/P LUMBAR FUSION: Primary | ICD-10-CM

## 2022-02-01 PROCEDURE — 99024 POSTOP FOLLOW-UP VISIT: CPT | Performed by: ORTHOPAEDIC SURGERY

## 2022-02-01 RX ORDER — ACETAMINOPHEN 325 MG/1
TABLET ORAL
COMMUNITY
End: 2022-11-01

## 2022-02-01 RX ORDER — GABAPENTIN 300 MG/1
300 CAPSULE ORAL
Qty: 30 CAPSULE | Refills: 1 | Status: SHIPPED | OUTPATIENT
Start: 2022-02-01 | End: 2022-05-18

## 2022-02-01 NOTE — PROGRESS NOTES
Shruthi Lemos (: 1955) is a 74 y.o. female, patient, here for evaluation of the following chief complaint(s):  Surgical Follow-up       ASSESSMENT/PLAN:    Below is the assessment and plan developed based on review of pertinent history, physical exam, labs, studies, and medications. At this point she is stable postoperatively. She will increase activities. She will wean out of the lumbar corset. To continue to wean medications. We will see her back in 6 weeks to start formal physical therapy. 1. S/P lumbar fusion  -     XR SPINE LUMB 2 OR 3 V; Future  -     gabapentin (Neurontin) 300 mg capsule; Take 1 Capsule by mouth nightly. Max Daily Amount: 300 mg., Print, Disp-30 Capsule, R-1      No follow-ups on file. SUBJECTIVE/OBJECTIVE:  Shruthi Lemos (: 1955) is a 74 y.o. female. No flowsheet data found. There for 6-week follow-up. No acute findings are noted. Back pain is expected but is improving. She denies any bowel bladder difficulties. Imaging:          XR Results (most recent):  Results from Appointment encounter on 22    XR SPINE LUMB 2 OR 3 V    Narrative  PA and lateral lumbar spine demonstrate stable L4-5 fusion. No acute changes       MRI Results (most recent):  No results found for this or any previous visit. Allergies   Allergen Reactions    Morphine Nausea Only    Nitrofurantoin Hives    Sulfa (Sulfonamide Antibiotics) Nausea Only and Unknown (comments)    Macrobid [Nitrofurantoin Monohyd/M-Cryst] Hives       Current Outpatient Medications   Medication Sig    acetaminophen (TylenoL) 325 mg tablet Take  by mouth every four (4) hours as needed for Pain.  gabapentin (Neurontin) 300 mg capsule Take 1 Capsule by mouth nightly. Max Daily Amount: 300 mg.  cyclobenzaprine (FLEXERIL) 10 mg tablet Take 1 Tablet by mouth three (3) times daily as needed for Muscle Spasm(s).  (Patient not taking: Reported on 2022)    albuterol (PROVENTIL HFA, VENTOLIN HFA, PROAIR HFA) 90 mcg/actuation inhaler Take 2 Puffs by inhalation every six (6) hours as needed for Wheezing.  multivitamin (ONE A DAY) tablet Take 1 Tablet by mouth daily.  b complex vitamins tablet Take 1 Tablet by mouth daily.  cholecalciferol (Vitamin D3) (1000 Units /25 mcg) tablet Take  by mouth daily.  levothyroxine (SYNTHROID) 175 mcg tablet Take 1 Tablet by mouth Daily (before breakfast).  lisinopriL (PRINIVIL, ZESTRIL) 20 mg tablet Take 1 Tablet by mouth daily. Indications: high blood pressure    hydroCHLOROthiazide (HYDRODIURIL) 12.5 mg tablet Take 1 Tablet by mouth daily. Indications: high blood pressure    melatonin 5 mg tablet Take  by mouth nightly. Takes 2 at bedtime    aspirin 81 mg chewable tablet Take 81 mg by mouth daily. No current facility-administered medications for this visit.        Past Medical History:   Diagnosis Date    Anxiety     Arthritis     Cataract     Chronic obstructive pulmonary disease (HCC)     Chronic pain of right groin     after multiple complications from bladder sling that became imbedded    Depression     GERD (gastroesophageal reflux disease)     HTN (hypertension)     Lobular carcinoma in situ of breast 2009    right breast bx LCIS    Lumbar stenosis with neurogenic claudication 10/15/2021    Pneumonia     Spondylisthesis 10/15/2021    Thyroid disease     UTI (urinary tract infection)         Past Surgical History:   Procedure Laterality Date    HX ADENOIDECTOMY  1960    HX BLADDER SUSPENSION      Removed     HX BREAST BIOPSY Right 2009    LCIS    HX BREAST BIOPSY Left 2000's    punch bx benign had to be put under    HX CATARACT REMOVAL Bilateral     HX HYSTERECTOMY  2015    HX LYMPHADENECTOMY      HX TONSILLECTOMY  1960    HX UROLOGICAL  2014    MESH ,SLING PLACED     HX UROLOGICAL  2016    REMOVAL OF SLING     HX UROLOGICAL  2014    CYSTOCCELE/ RECTOCELE     [place] BIOPSY OF BREAST, INCISIONAL Bilateral Family History   Problem Relation Age of Onset   [place] Cancer Mother         BREAST     Breast Cancer Mother         dx age 48    Alcohol abuse Father     Breast Cancer Sister         dx age 48   [place] Anesth Problems Neg Hx         Social History     Tobacco Use    Smoking status: Current Every Day Smoker     Packs/day: 1.00     Years: 48.00     Pack years: 48.00     Types: Cigarettes    Smokeless tobacco: Never Used   Vaping Use    Vaping Use: Former    Substances: CBD, [address] [place] 2018    Devices: Pre-filled or refillable cartridge   Substance Use Topics    Alcohol use: Yes     Comment: rarely    Drug use: Yes     Types: Marijuana, Prescription     Comment: nightly(pain relief)        Review of Systems   Constitutional: Positive for activity change. Musculoskeletal: Positive for back pain and gait problem. All other systems reviewed and are negative. Vitals:  Ht 5' 6\" (1.676 m)   Wt 163 lb (73.9 kg)   BMI 26.31 kg/m²    Body mass index is 26.31 kg/m². Ortho Exam       Integumentary  Assessment of Surgical Incision - healing and consistent with normal anticipated wound healing. Neurologic  Sensory  Light Touch - Intact - Globally. Overall Assessment of Muscle Strength and Tone reveals  Lower Extremities - Right Iliopsoas - 5/5. Left Iliopsoas - 5/5. Right Tibialis Anterior - 5/5. Left Tibialis Anterior - 5/5. Right Gastroc-Soleus - 5/5. Left Gastroc-Soleus - 5/5. Right EHL - 5/5. Left EHL - 5/5. General Assessment of Reflexes  Right Ankle - Clonus is not present. Left Ankle - Clonus is not present. Reflexes (Dermatomes)  2/2 Normal - Left Achilles (L5-S2), Left Knee (L2-4), Right Achilles (L5-S2) and Right Knee (L2-4). Musculoskeletal  Global Assessment  Examination of related systems reveals - well-developed, well-nourished, in no acute distress, alert and oriented x 3 and normal coordination.   Spine/Ribs/Pelvis  Lumbosacral Spine - Examination of the lumbosacral spine reveals - no known fractures or deformities. Inspection and Palpation - Tenderness - moderate. Assessment of pain reveals the following findings - The pain is characterized as - moderate. Location - pain refers to lower back bilaterally. An electronic signature was used to authenticate this note.   -- Smitha Ojeda MD

## 2022-02-14 ENCOUNTER — TELEPHONE (OUTPATIENT)
Dept: FAMILY MEDICINE CLINIC | Age: 67
End: 2022-02-14

## 2022-02-14 NOTE — TELEPHONE ENCOUNTER
Rec'd call from [facility] with DILLON ROONEY wanting to know if Arlette Kidd would be in agreement with f/u and signing of all home health orders for the patient> Her call back # ll [phone]

## 2022-02-14 NOTE — TELEPHONE ENCOUNTER
Returned David Olivier from [facility] phone call to shane Montgomery response to earlier question.  No answer, no voicemail

## 2022-02-23 ENCOUNTER — OFFICE VISIT (OUTPATIENT)
Dept: FAMILY MEDICINE CLINIC | Age: 67
End: 2022-02-23
Payer: MEDICARE

## 2022-02-23 VITALS
OXYGEN SATURATION: 95 % | BODY MASS INDEX: 26.81 KG/M2 | TEMPERATURE: 97 F | DIASTOLIC BLOOD PRESSURE: 60 MMHG | RESPIRATION RATE: 16 BRPM | HEIGHT: 66 IN | WEIGHT: 166.8 LBS | SYSTOLIC BLOOD PRESSURE: 100 MMHG | HEART RATE: 70 BPM

## 2022-02-23 DIAGNOSIS — F13.20 BENZODIAZEPINE DEPENDENCE (HCC): ICD-10-CM

## 2022-02-23 DIAGNOSIS — C50.411 MALIGNANT NEOPLASM OF UPPER-OUTER QUADRANT OF RIGHT FEMALE BREAST, UNSPECIFIED ESTROGEN RECEPTOR STATUS (HCC): ICD-10-CM
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
## 2022-02-23 DIAGNOSIS — J42 CHRONIC BRONCHITIS, UNSPECIFIED CHRONIC BRONCHITIS TYPE (HCC): ICD-10-CM

## 2022-02-23 PROCEDURE — 99214 OFFICE O/P EST MOD 30 MIN: CPT | Performed by: NURSE PRACTITIONER

## 2022-02-23 RX ORDER — GABAPENTIN 400 MG/1
CAPSULE ORAL
COMMUNITY
Start: 2022-02-13 | End: 2022-05-18

## 2022-02-23 RX ORDER — DIAZEPAM 5 MG/1
5 TABLET ORAL
COMMUNITY
End: 2022-05-18 | Stop reason: ALTCHOICE

## 2022-02-23 RX ORDER — OXYCODONE HYDROCHLORIDE 10 MG/1
10 TABLET ORAL
COMMUNITY
End: 2022-05-18 | Stop reason: ALTCHOICE

## 2022-02-23 RX ORDER — DOCUSATE SODIUM 100 MG/1
100 CAPSULE, LIQUID FILLED ORAL 2 TIMES DAILY
COMMUNITY
End: 2022-05-18

## 2022-02-23 RX ORDER — DOCUSATE SODIUM 100 MG/1
CAPSULE, LIQUID FILLED ORAL
COMMUNITY
Start: 2022-02-13 | End: 2022-05-18 | Stop reason: ALTCHOICE

## 2022-02-23 NOTE — PROGRESS NOTES
Chief Complaint   Patient presents with   [address]/u [facility] for fall fractuered ribs with surgery     1. Have you been to the ER, urgent care clinic since your last visit? Hospitalized since your last visit? Yes [facility] 3 weeks ago for fall and fractured ribs with surgergy    2. Have you seen or consulted any other health care providers outside of the [address] [place] [address] since your last visit? Include any pap smears or colon screening.  No      Chief Complaint   Patient presents with   [address] for fall fractuered ribs with surgery         Visit Vitals  /60 (BP 1 Location: Right upper arm, BP Patient Position: Sitting, BP Cuff Size: Adult)   Pulse 70   Temp 97 °F (36.1 °C) (Temporal)   Resp 16   Ht 5' 6\" (1.676 m)   Wt 166 lb 12.8 oz (75.7 kg)   SpO2 95%   BMI 26.92 kg/m²       Pain Scale: 4/10  Pain Location: Rib Cage

## 2022-02-26 NOTE — PROGRESS NOTES
Subjective: Jef Rivas is a 74 y.o. female who presents today with the following:  Chief Complaint   Patient presents with    Fall     f/u [facility] for fall fractuered ribs with surgery       Patient Active Problem List   Diagnosis Code    Chronic fatigue R53.82    Chronic pain syndrome G89.4    Depression with anxiety F41.8    Abnormal weight loss R63.4    Acquired hypothyroidism E03.9    Gastroesophageal reflux disease without esophagitis K21.9    Tobacco use Z72.0    Marijuana use F12.90    Nocturnal hypoxemia G47.34    Opiate dependence (HCC) F11.20    Benzodiazepine dependence (HCC) F13.20    Other emphysema ([facility] [address] J43.8    Vitamin B12 deficiency E53.8    Vitamin D deficiency E55.9    Spondylisthesis M43.10    Lumbar stenosis with neurogenic claudication M48.062    Lumbar stenosis M48.061    S/P lumbar fusion Z98.1       HPI; Ms. Oumar Prince endorses she was visiting family and fell asleep on the couch. She woke up in the dark room  To go to the rest room and fell anayeli the flight of stairs abut 3 weeks ago . She was taken to [facility] under the care of trauma surgery . Treated for fractures ribs  With plates to stabilize and chest tubes. She is home now with home health . Hx of LCIS ; She will need an MRI of the breasts if appropriate . Will need to check with trauma surgeon to see if stabilizing plate will be removed. COMPLIANT WITH MEDICATION:    Denies chest pain, dyspnea, palpitations, headache and blurred vision. Blood pressure normotensive.     depression screening addressed not at risk    abuse screening addressed denies    learning assessment addressed reviewed nurses notes    fall risk addressed not at risk    HM: addressed    ROS:  Gen: denies fever, chills, fatigue, weight loss, weight gain  HEENT:denies blurry vision, nasal congestion, sore throat  Resp: denies dypsnea, cough, wheezing  CV: denies chest pain radiating to the jaws or arms, palpitations, lower extremity edema  Abd: denies nausea, vomiting, diarrhea, constipation  Neuro: denies numbness/tingling  Endo: denies polyuria, polydipsia, heat/cold intolerance  Heme: no lymphadenopathy    Allergies   Allergen Reactions    Morphine Nausea Only    Nitrofurantoin Hives    Sulfa (Sulfonamide Antibiotics) Nausea Only and Unknown (comments)    Macrobid [Nitrofurantoin Monohyd/M-Cryst] Hives         Current Outpatient Medications:     diazePAM (VALIUM) 5 mg tablet, Take 5 mg by mouth every eight (8) hours as needed. , Disp: , Rfl:     docusate sodium (COLACE) 100 mg capsule, TAKE 1 CAPSULE TWICE A DAY, Disp: , Rfl:     docusate sodium (COLACE) 100 mg capsule, Take 100 mg by mouth two (2) times a day., Disp: , Rfl:     oxyCODONE IR (ROXICODONE) 10 mg tab immediate release tablet, Take 10 mg by mouth., Disp: , Rfl:     gabapentin (NEURONTIN) 400 mg capsule, TAKE 400 MG BY MOUTH EVERY 8 HOURS, Disp: , Rfl:     multivitamin (ONE A DAY) tablet, Take 1 Tablet by mouth daily. , Disp: , Rfl:     b complex vitamins tablet, Take 1 Tablet by mouth daily. , Disp: , Rfl:     cholecalciferol (Vitamin D3) (1000 Units /25 mcg) tablet, Take  by mouth daily. , Disp: , Rfl:     levothyroxine (SYNTHROID) 175 mcg tablet, Take 1 Tablet by mouth Daily (before breakfast). , Disp: 90 Tablet, Rfl: 3    lisinopriL (PRINIVIL, ZESTRIL) 20 mg tablet, Take 1 Tablet by mouth daily. Indications: high blood pressure, Disp: 90 Tablet, Rfl: 3    hydroCHLOROthiazide (HYDRODIURIL) 12.5 mg tablet, Take 1 Tablet by mouth daily. Indications: high blood pressure, Disp: 90 Tablet, Rfl: 1    melatonin 5 mg tablet, Take  by mouth nightly. Takes 2 at bedtime, Disp: , Rfl:     acetaminophen (TylenoL) 325 mg tablet, Take  by mouth every four (4) hours as needed for Pain. (Patient not taking: Reported on 2/23/2022), Disp: , Rfl:     gabapentin (Neurontin) 300 mg capsule, Take 1 Capsule by mouth nightly. Max Daily Amount: 300 mg.  (Patient not taking: Reported on 2/23/2022), Disp: 30 Capsule, Rfl: 1    cyclobenzaprine (FLEXERIL) 10 mg tablet, Take 1 Tablet by mouth three (3) times daily as needed for Muscle Spasm(s). (Patient not taking: Reported on 2/1/2022), Disp: 30 Tablet, Rfl: 0    albuterol (PROVENTIL HFA, VENTOLIN HFA, PROAIR HFA) 90 mcg/actuation inhaler, Take 2 Puffs by inhalation every six (6) hours as needed for Wheezing. (Patient not taking: Reported on 2/23/2022), Disp: 18 g, Rfl: 2    aspirin 81 mg chewable tablet, Take 81 mg by mouth daily.  (Patient not taking: Reported on 2/23/2022), Disp: , Rfl:     Past Medical History:   Diagnosis Date    Anxiety     Arthritis     Cataract     Chronic obstructive pulmonary disease (HCC)     Chronic pain of right groin     after multiple complications from bladder sling that became imbedded    Depression     GERD (gastroesophageal reflux disease)     HTN (hypertension)     Lobular carcinoma in situ of breast 2009    right breast bx LCIS    Lumbar stenosis with neurogenic claudication 10/15/2021    Pneumonia     Spondylisthesis 10/15/2021    Thyroid disease     UTI (urinary tract infection)        Past Surgical History:   Procedure Laterality Date    HX ADENOIDECTOMY  1960    HX BLADDER SUSPENSION      Removed     HX BREAST BIOPSY Right 2009    LCIS    HX BREAST BIOPSY Left 2000's    punch bx benign had to be put under    HX CATARACT REMOVAL Bilateral     HX HYSTERECTOMY  2015    HX LYMPHADENECTOMY      HX OTHER SURGICAL  02/03/2022    plate inchest to stabalize fractured ribs    HX OTHER SURGICAL  11-    spinal infusion    HX TONSILLECTOMY  1960    HX UROLOGICAL  2014    MESH ,SLING PLACED     HX UROLOGICAL  2016    REMOVAL OF SLING     HX UROLOGICAL  2014    CYSTOCCELE/ RECTOCELE     [place] BIOPSY OF BREAST, INCISIONAL Bilateral        Social History     Tobacco Use   Smoking Status Current Every Day Smoker    Packs/day: 1.00    Years: 48.00    Pack years: 48.00    Types: Cigarettes   Smokeless Tobacco Never Used       Social History     Socioeconomic History    Marital status:    Tobacco Use    Smoking status: Current Every Day Smoker     Packs/day: 1.00     Years: 48.00     Pack years: 48.00     Types: Cigarettes    Smokeless tobacco: Never Used   Vaping Use    Vaping Use: Former    Substances: CBD, [address] [place] 2018    Devices: Pre-filled or refillable cartridge   Substance and Sexual Activity    Alcohol use: Yes     Comment: rarely    Drug use: Yes     Types: Marijuana, Prescription     Comment: nightly(pain relief)    Sexual activity: Not Currently   Other Topics Concern     Service No    Blood Transfusions No    Caffeine Concern No    Occupational Exposure No    Hobby Hazards No    Sleep Concern No    Stress Concern No    Weight Concern Yes    Special Diet No    Back Care Yes    Exercise Yes    Bike Helmet No    Seat Belt Yes    Self-Exams Yes       Family History   Problem Relation Age of Onset    Cancer Mother         BREAST     Breast Cancer Mother         dx age 48   [place] Alcohol abuse Father     Breast Cancer Sister         dx age 48   [place] Anesth Problems Neg Hx          Objective:     Visit Vitals  /60 (BP 1 Location: Right upper arm, BP Patient Position: Sitting, BP Cuff Size: Adult)   Pulse 70   Temp 97 °F (36.1 °C) (Temporal)   Resp 16   Ht 5' 6\" (1.676 m)   Wt 166 lb 12.8 oz (75.7 kg)   SpO2 95%   BMI 26.92 kg/m²     Body mass index is 26.92 kg/m². General: Alert and oriented. No acute distress. Well nourished  HEENT :  Ears:TMs are normal. Canals are clear. Eyes: pupils equal, round, react to light and accommodation. Extra ocular movements intact. Nose: patent. Mouth and throat is clear. Neck:supple full range of motion no thyromegaly. Trachea midline, No carotid bruits. No significant lymphadenopathy  Lungs[de-identified] clear to auscultation without wheezes, rales, or rhonchi. Heart :RRR, S1 & S2 are normal intensity.  No murmur; no gallop  Abdomen: bowel sounds active. No tenderness, guarding, rebound, masses, hepatic or spleen enlargement  Back: no CVA tenderness. Extremities: without clubbing, cyanosis, or edema  Pulses: radial and femoral pulses are normal  Neuro: HMF intact. Cranial nerves II through XII grossly normal.  Motor: is 5 over 5 and symmetrical.   Deep tendon reflexes: +2 equal  Integumentary -left side of chest dressings intact. Psych:appropriate behavior, mood, affect and judgement. Results for orders placed or performed during the hospital encounter of 37/02/59   METABOLIC PANEL, BASIC   Result Value Ref Range    Sodium 136 136 - 145 mmol/L    Potassium 3.8 3.5 - 5.1 mmol/L    Chloride 104 97 - 108 mmol/L    CO2 29 21 - 32 mmol/L    Anion gap 3 (L) 5 - 15 mmol/L    Glucose 127 (H) 65 - 100 mg/dL    BUN 17 6 - 20 MG/DL    Creatinine 0.77 0.55 - 1.02 MG/DL    BUN/Creatinine ratio 22 (H) 12 - 20      GFR est AA >60 >60 ml/min/1.73m2    GFR est non-AA >60 >60 ml/min/1.73m2    Calcium 9.5 8.5 - 10.1 MG/DL   HEMOGLOBIN   Result Value Ref Range    HGB 13.3 11.5 - 16.0 g/dL   HEMOGLOBIN   Result Value Ref Range    HGB 11.4 (L) 11.5 - 16.0 g/dL   GLUCOSE, POC   Result Value Ref Range    Glucose (POC) 75 65 - 117 mg/dL    Performed by [employer]    POC HEMOGLOBIN   Result Value Ref Range    Hemoglobin (POC) 14.0 11.5 - 16.0 g/dL       No results found for this visit on 02/23/22. Assessment/ Plan:     1. Chronic bronchitis, unspecified chronic bronchitis type ([address]  Stable     2. Benzodiazepine dependence ([address]  Trauma surgeon -pain management     3. Malignant neoplasm of upper-outer quadrant of right female breast, unspecified estrogen receptor status ([address]  As noted above  MRI may be contraindicated with metal plate to stabilize the ribs . 4. Fall, sequela  Continuefollow up with trauma surgery and home health  Dressings on left side of chest after chest tubes discontinued.       Orders Placed This Encounter  diazePAM (VALIUM) 5 mg tablet     Sig: Take 5 mg by mouth every eight (8) hours as needed.  docusate sodium (COLACE) 100 mg capsule     Sig: TAKE 1 CAPSULE TWICE A DAY    docusate sodium (COLACE) 100 mg capsule     Sig: Take 100 mg by mouth two (2) times a day.  oxyCODONE IR (ROXICODONE) 10 mg tab immediate release tablet     Sig: Take 10 mg by mouth.  gabapentin (NEURONTIN) 400 mg capsule     Sig: TAKE 400 MG BY MOUTH EVERY 8 HOURS         Verbal and written instructions (see AVS) provided. Patient expresses understanding of diagnosis and treatment plan.     Health Maintenance Due   Topic Date Due    Colorectal Cancer Screening Combo  Never done    Shingrix Vaccine Age 50> (1 of 2) Never done    Low dose CT lung screening  Never done               ANDREW Dykes

## 2022-02-26 NOTE — ACP (ADVANCE CARE PLANNING)
Discussed importance of advanced medical directives with patient. Patient is capable of making decisions.   Bao Cadet NP-C

## 2022-03-18 PROBLEM — Z72.0 TOBACCO USE: Status: ACTIVE | Noted: 2020-07-21

## 2022-03-18 PROBLEM — F41.8 DEPRESSION WITH ANXIETY: Status: ACTIVE | Noted: 2020-07-20

## 2022-03-18 PROBLEM — F11.20 OPIATE DEPENDENCE (HCC): Status: ACTIVE | Noted: 2020-08-05

## 2022-03-19 PROBLEM — G89.4 CHRONIC PAIN SYNDROME: Status: ACTIVE | Noted: 2020-07-20

## 2022-03-19 PROBLEM — E55.9 VITAMIN D DEFICIENCY: Status: ACTIVE | Noted: 2020-08-24

## 2022-03-19 PROBLEM — G47.34 NOCTURNAL HYPOXEMIA: Status: ACTIVE | Noted: 2020-08-05

## 2022-03-19 PROBLEM — E53.8 VITAMIN B12 DEFICIENCY: Status: ACTIVE | Noted: 2020-08-24

## 2022-03-19 PROBLEM — K21.9 GASTROESOPHAGEAL REFLUX DISEASE WITHOUT ESOPHAGITIS: Status: ACTIVE | Noted: 2020-07-21

## 2022-03-19 PROBLEM — Z98.1 S/P LUMBAR FUSION: Status: ACTIVE | Noted: 2021-12-21

## 2022-03-19 PROBLEM — F13.20 BENZODIAZEPINE DEPENDENCE (HCC): Status: ACTIVE | Noted: 2020-08-05

## 2022-03-19 PROBLEM — M48.062 LUMBAR STENOSIS WITH NEUROGENIC CLAUDICATION: Status: ACTIVE | Noted: 2021-10-15

## 2022-03-19 PROBLEM — R53.82 CHRONIC FATIGUE: Status: ACTIVE | Noted: 2020-07-20

## 2022-03-19 PROBLEM — J43.8 OTHER EMPHYSEMA (HCC): Status: ACTIVE | Noted: 2020-08-24

## 2022-03-19 PROBLEM — F12.90 MARIJUANA USE: Status: ACTIVE | Noted: 2020-07-21

## 2022-03-19 PROBLEM — E03.9 ACQUIRED HYPOTHYROIDISM: Status: ACTIVE | Noted: 2020-07-21

## 2022-03-20 PROBLEM — M43.10 SPONDYLISTHESIS: Status: ACTIVE | Noted: 2021-10-15

## 2022-03-20 PROBLEM — R63.4 ABNORMAL WEIGHT LOSS: Status: ACTIVE | Noted: 2020-07-21

## 2022-03-20 PROBLEM — M48.061 LUMBAR STENOSIS: Status: ACTIVE | Noted: 2021-11-02

## 2022-03-22 ENCOUNTER — OFFICE VISIT (OUTPATIENT)
Dept: ORTHOPEDIC SURGERY | Age: 67
End: 2022-03-22

## 2022-03-22 VITALS — HEIGHT: 66 IN | BODY MASS INDEX: 26.03 KG/M2 | WEIGHT: 162 LBS

## 2022-03-22 DIAGNOSIS — M54.6 ACUTE MIDLINE THORACIC BACK PAIN: ICD-10-CM

## 2022-03-22 DIAGNOSIS — M54.41 ACUTE MIDLINE LOW BACK PAIN WITH RIGHT-SIDED SCIATICA: ICD-10-CM

## 2022-03-22 DIAGNOSIS — Z98.1 S/P LUMBAR FUSION: Primary | ICD-10-CM

## 2022-03-22 PROCEDURE — 99214 OFFICE O/P EST MOD 30 MIN: CPT | Performed by: ORTHOPAEDIC SURGERY

## 2022-03-22 PROCEDURE — 1090F PRES/ABSN URINE INCON ASSESS: CPT | Performed by: ORTHOPAEDIC SURGERY

## 2022-03-22 PROCEDURE — 1101F PT FALLS ASSESS-DOCD LE1/YR: CPT | Performed by: ORTHOPAEDIC SURGERY

## 2022-03-22 PROCEDURE — G9899 SCRN MAM PERF RSLTS DOC: HCPCS | Performed by: ORTHOPAEDIC SURGERY

## 2022-03-22 PROCEDURE — G8419 CALC BMI OUT NRM PARAM NOF/U: HCPCS | Performed by: ORTHOPAEDIC SURGERY

## 2022-03-22 PROCEDURE — G9717 DOC PT DX DEP/BP F/U NT REQ: HCPCS | Performed by: ORTHOPAEDIC SURGERY

## 2022-03-22 PROCEDURE — G8399 PT W/DXA RESULTS DOCUMENT: HCPCS | Performed by: ORTHOPAEDIC SURGERY

## 2022-03-22 PROCEDURE — 3017F COLORECTAL CA SCREEN DOC REV: CPT | Performed by: ORTHOPAEDIC SURGERY

## 2022-03-22 PROCEDURE — G8427 DOCREV CUR MEDS BY ELIG CLIN: HCPCS | Performed by: ORTHOPAEDIC SURGERY

## 2022-03-22 PROCEDURE — G8536 NO DOC ELDER MAL SCRN: HCPCS | Performed by: ORTHOPAEDIC SURGERY

## 2022-03-22 RX ORDER — GABAPENTIN 300 MG/1
300 CAPSULE ORAL 3 TIMES DAILY
Qty: 60 CAPSULE | Refills: 1 | Status: ON HOLD | OUTPATIENT
Start: 2022-03-22 | End: 2022-10-20

## 2022-03-22 NOTE — PROGRESS NOTES
1. Have you been to the ER, urgent care clinic since your last visit? Hospitalized since your last visit? Yes When: 02/03/2022 [facility]    2. Have you seen or consulted any other health care providers outside of the [address] [place] [address] since your last visit? Include any pap smears or colon screening.  No    Chief Complaint   Patient presents with    Surgical Follow-up     Sx 11/02/2021 L4/5 Lami/Midline Fusion    Back Pain     Fell on 2/03/2022

## 2022-03-22 NOTE — LETTER
3/24/2022    Patient: Malinda Corrales   YOB: 1955   Date of Visit: 3/22/2022     Guthrie SERAFIN AlejandraRehob[facility]  [address] [address]  [address] [facility] Me, NP,      Thank you for referring Ms. Neeta Singh to [facility] for evaluation. My notes for this consultation are attached. If you have questions, please do not hesitate to call me. I look forward to following your patient along with you.       Sincerely,    Darcy Fotoe MD

## 2022-03-24 NOTE — PROGRESS NOTES
Reta Webster (: 1955) is a 74 y.o. female, patient, here for evaluation of the following chief complaint(s):  Surgical Follow-up (Sx 2021 L4/5 Lami/Midline Fusion) and Back Pain Doylene Dyers on 2022)       ASSESSMENT/PLAN:  Below is the assessment and plan developed based on review of pertinent history, physical exam, labs, studies, and medications. Patient presents today approximately 4.5 months status post MIDLIF, and approximately 1.5 months after a fall down a few stairs. She was taken to [facility], as detailed below. She did not bring any imaging with her that was done during this hospital stay. She does report new right buttock pain as well as midline pain extending up to the mid thoracic region. She is tender to palpation over spinous processes in the mid thoracic and low back region. Denies changes in bowel or bladder control. No weakness noted on physical exam.  Refill of gabapentin prescribed to the patient today.  checked. Additionally, I would like for her to obtain CTs of both lumbar and thoracic spine. She will follow-up to discuss the results. Radiologic findings reviewed with the patient today. Additionally, encouraged the patient to follow-up with the surgeon who repaired her ribs. 1. S/P lumbar fusion  -     XR SPINE LUMB 2 OR 3 V; Future  -     XR SPINE THORAC 3 V; Future  -     CT SPINE LUMB WO CONT; Future  2. Acute midline low back pain with right-sided sciatica  -     gabapentin (NEURONTIN) 300 mg capsule; Take 1 Capsule by mouth three (3) times daily. Max Daily Amount: 900 mg., Normal, Disp-60 Capsule, R-1  -     CT SPINE LUMB WO CONT; Future  3. Acute midline thoracic back pain  -     gabapentin (NEURONTIN) 300 mg capsule; Take 1 Capsule by mouth three (3) times daily. Max Daily Amount: 900 mg., Normal, Disp-60 Capsule, R-1  -     CT SPINE THORAC WO CONT; Future      No follow-ups on file.       SUBJECTIVE/OBJECTIVE:  Reta Webster (: 1955) is a 74 y.o. female. No flowsheet data found. Patient presents today approximately 4.5 months status post recent L4-5 MIDLIF. She was doing well overall, but unfortunately had a fall in the beginning of February. She reports she fell down the stairs at night when trying to go to the bathroom, she was taken to [facility].  At that time she had multiple rib fractures that were surgically repaired. She is unsure if she had any imaging of her spine done. She did not bring any imaging with her today. Since the fall, she reports pain into her right buttock as well as midline pain extending all the way up into the mid thoracic region. She does report numbness in bilateral feet. She denies other radiating pain, numbness or tingling. Denies changes in bowel or bladder control. She is currently taking Tylenol and methocarbamol as well as 1 oxycodone per week as prescribed by the surgeon who urgently repaired her rib fractures. She was previously taking gabapentin but ran out a few weeks ago. She is requesting a new refill of this medication. Imaging:    XR Results (most recent):  Results from Appointment encounter on 03/22/22    XR [address] 3 V    Narrative  AP and lateral views of the thoracic spine reveal mild disc degeneration throughout thoracic spine, as well as questionable mild compression fractures in the mid-thoracic region. XR SPINE LUMB 2 OR 3 V    Narrative  AP and lateral of the thoracic spine reviewed today. No obvious fracture lytic lesions noted. Normal thoracic kyphosis. **On thoracic XRAY: Displaced rib fractures noted at Rib 7 and 8 as well as fixation on ribs 5, 6 and 7.      Allergies   Allergen Reactions    Morphine Nausea Only    Nitrofurantoin Hives    Sulfa (Sulfonamide Antibiotics) Nausea Only and Unknown (comments)    Macrobid [Nitrofurantoin Monohyd/M-Cryst] Hives       Current Outpatient Medications   Medication Sig    gabapentin (NEURONTIN) 300 mg capsule Take 1 Capsule by mouth three (3) times daily. Max Daily Amount: 900 mg.    diazePAM (VALIUM) 5 mg tablet Take 5 mg by mouth every eight (8) hours as needed.  docusate sodium (COLACE) 100 mg capsule TAKE 1 CAPSULE TWICE A DAY    docusate sodium (COLACE) 100 mg capsule Take 100 mg by mouth two (2) times a day.  oxyCODONE IR (ROXICODONE) 10 mg tab immediate release tablet Take 10 mg by mouth.  gabapentin (NEURONTIN) 400 mg capsule TAKE 400 MG BY MOUTH EVERY 8 HOURS    acetaminophen (TylenoL) 325 mg tablet Take  by mouth every four (4) hours as needed for Pain.  gabapentin (Neurontin) 300 mg capsule Take 1 Capsule by mouth nightly. Max Daily Amount: 300 mg.  cyclobenzaprine (FLEXERIL) 10 mg tablet Take 1 Tablet by mouth three (3) times daily as needed for Muscle Spasm(s).  albuterol (PROVENTIL HFA, VENTOLIN HFA, PROAIR HFA) 90 mcg/actuation inhaler Take 2 Puffs by inhalation every six (6) hours as needed for Wheezing.  multivitamin (ONE A DAY) tablet Take 1 Tablet by mouth daily.  b complex vitamins tablet Take 1 Tablet by mouth daily.  cholecalciferol (Vitamin D3) (1000 Units /25 mcg) tablet Take  by mouth daily.  levothyroxine (SYNTHROID) 175 mcg tablet Take 1 Tablet by mouth Daily (before breakfast).  lisinopriL (PRINIVIL, ZESTRIL) 20 mg tablet Take 1 Tablet by mouth daily. Indications: high blood pressure    hydroCHLOROthiazide (HYDRODIURIL) 12.5 mg tablet Take 1 Tablet by mouth daily. Indications: high blood pressure    melatonin 5 mg tablet Take  by mouth nightly. Takes 2 at bedtime    aspirin 81 mg chewable tablet Take 81 mg by mouth daily. No current facility-administered medications for this visit.        Past Medical History:   Diagnosis Date    Anxiety     Arthritis     Cataract     Chronic obstructive pulmonary disease (HCC)     Chronic pain of right groin     after multiple complications from bladder sling that became imbedded    Depression  GERD (gastroesophageal reflux disease)     HTN (hypertension)     Lobular carcinoma in situ of breast 2009    right breast bx LCIS    Lumbar stenosis with neurogenic claudication 10/15/2021    Pneumonia     Spondylisthesis 10/15/2021    Thyroid disease     UTI (urinary tract infection)         Past Surgical History:   Procedure Laterality Date    HX ADENOIDECTOMY  1960    HX BLADDER SUSPENSION      Removed     HX BREAST BIOPSY Right 2009    LCIS    HX BREAST BIOPSY Left 2000's    punch bx benign had to be put under    HX CATARACT REMOVAL Bilateral     HX HYSTERECTOMY  2015    HX LYMPHADENECTOMY      HX OTHER SURGICAL  02/03/2022    plate inchest to stabalize fractured ribs    HX OTHER SURGICAL  11-    spinal infusion    HX TONSILLECTOMY  1960    HX UROLOGICAL  2014    MESH ,SLING PLACED     HX UROLOGICAL  2016    REMOVAL OF SLING     HX UROLOGICAL  2014    CYSTOCCELE/ RECTOCELE     VT BIOPSY OF BREAST, INCISIONAL Bilateral        Family History   Problem Relation Age of Onset    Cancer Mother         BREAST     Breast Cancer Mother         dx age 48    Alcohol abuse Father     Breast Cancer Sister         dx age 48   Jacky Stager Anesth Problems Neg Hx         Social History     Tobacco Use    Smoking status: Current Every Day Smoker     Packs/day: 1.00     Years: 48.00     Pack years: 48.00     Types: Cigarettes    Smokeless tobacco: Never Used   Vaping Use    Vaping Use: Former    Substances: CBD, [address] 2018    Devices: Pre-filled or refillable cartridge   Substance Use Topics    Alcohol use: Yes     Comment: rarely    Drug use: Yes     Types: Marijuana, Prescription     Comment: nightly(pain relief)        Review of Systems   Musculoskeletal: Positive for back pain. All other systems reviewed and are negative. Vitals:  Ht 5' 6\" (1.676 m)   Wt 162 lb (73.5 kg)   BMI 26.15 kg/m²    Body mass index is 26.15 kg/m².     Physical Exam  Integumentary  Assessment of Surgical Incision - healing and consistent with normal anticipated wound healing. Neurologic  Sensory  Light Touch - Intact - Globally. Overall Assessment of Muscle Strength and Tone reveals  Lower Extremities - Right Iliopsoas - 5/5. Left Iliopsoas - 5/5. Right Tibialis Anterior - 5/5. Left Tibialis Anterior - 5/5. Right Gastroc-Soleus - 5/5. Left Gastroc-Soleus - 5/5. Right EHL - 5/5. Left EHL - 5/5. General Assessment of Reflexes  Right Ankle - Clonus is not present. Left Ankle - Clonus is not present. Reflexes (Dermatomes)  2/2 Normal - Left Achilles (L5-S2), Left Knee (L2-4), Right Achilles (L5-S2) and Right Knee (L2-4). Musculoskeletal  Global Assessment  Examination of related systems reveals - well-developed, well-nourished, in no acute distress, alert and oriented x 3 and normal coordination. Spine/Ribs/Pelvis  Lumbosacral and thoracic spine - Examination of the lumbosacral and thoracic spine reveals - no known fractures or deformities. Inspection and Palpation - Tenderness - moderate. Assessment of pain reveals the following findings - The pain is characterized as - moderate. Location -midline in the mid thoracic and low back region. Dr. Antelmo Vargas was available for immediate consult during this encounter. An electronic signature was used to authenticate this note.   -- ARIAN Louise

## 2022-03-29 ENCOUNTER — APPOINTMENT (OUTPATIENT)
Dept: CT IMAGING | Age: 67
End: 2022-03-29
Attending: STUDENT IN AN ORGANIZED HEALTH CARE EDUCATION/TRAINING PROGRAM
Payer: MEDICARE

## 2022-03-29 ENCOUNTER — HOSPITAL ENCOUNTER (OUTPATIENT)
Dept: CT IMAGING | Age: 67
Discharge: HOME | End: 2022-03-29
Attending: STUDENT IN AN ORGANIZED HEALTH CARE EDUCATION/TRAINING PROGRAM
Payer: MEDICARE

## 2022-03-29 DIAGNOSIS — M54.6 ACUTE MIDLINE THORACIC BACK PAIN: ICD-10-CM

## 2022-03-29 DIAGNOSIS — M54.41 ACUTE MIDLINE LOW BACK PAIN WITH RIGHT-SIDED SCIATICA: ICD-10-CM

## 2022-03-29 DIAGNOSIS — Z98.1 S/P LUMBAR FUSION: ICD-10-CM

## 2022-03-29 PROCEDURE — 72128 CT CHEST SPINE W/O DYE: CPT

## 2022-03-29 PROCEDURE — 72131 CT LUMBAR SPINE W/O DYE: CPT

## 2022-04-26 ENCOUNTER — OFFICE VISIT (OUTPATIENT)
Dept: ORTHOPEDIC SURGERY | Age: 67
End: 2022-04-26
Payer: MEDICARE

## 2022-04-26 VITALS — BODY MASS INDEX: 26.03 KG/M2 | HEIGHT: 66 IN | WEIGHT: 162 LBS

## 2022-04-26 DIAGNOSIS — Z98.1 S/P LUMBAR FUSION: Primary | ICD-10-CM

## 2022-04-26 PROCEDURE — 99214 OFFICE O/P EST MOD 30 MIN: CPT | Performed by: ORTHOPAEDIC SURGERY

## 2022-04-26 PROCEDURE — G8419 CALC BMI OUT NRM PARAM NOF/U: HCPCS | Performed by: ORTHOPAEDIC SURGERY

## 2022-04-26 PROCEDURE — G9899 SCRN MAM PERF RSLTS DOC: HCPCS | Performed by: ORTHOPAEDIC SURGERY

## 2022-04-26 PROCEDURE — G9717 DOC PT DX DEP/BP F/U NT REQ: HCPCS | Performed by: ORTHOPAEDIC SURGERY

## 2022-04-26 PROCEDURE — G8399 PT W/DXA RESULTS DOCUMENT: HCPCS | Performed by: ORTHOPAEDIC SURGERY

## 2022-04-26 PROCEDURE — 3017F COLORECTAL CA SCREEN DOC REV: CPT | Performed by: ORTHOPAEDIC SURGERY

## 2022-04-26 PROCEDURE — G8427 DOCREV CUR MEDS BY ELIG CLIN: HCPCS | Performed by: ORTHOPAEDIC SURGERY

## 2022-04-26 PROCEDURE — G8536 NO DOC ELDER MAL SCRN: HCPCS | Performed by: ORTHOPAEDIC SURGERY

## 2022-04-26 PROCEDURE — 1090F PRES/ABSN URINE INCON ASSESS: CPT | Performed by: ORTHOPAEDIC SURGERY

## 2022-04-26 PROCEDURE — 1101F PT FALLS ASSESS-DOCD LE1/YR: CPT | Performed by: ORTHOPAEDIC SURGERY

## 2022-04-26 RX ORDER — CYCLOBENZAPRINE HCL 10 MG
10 TABLET ORAL
Qty: 30 TABLET | Refills: 0 | Status: SHIPPED | OUTPATIENT
Start: 2022-04-26 | End: 2022-04-26

## 2022-04-26 RX ORDER — CYCLOBENZAPRINE HCL 10 MG
10 TABLET ORAL
Qty: 30 TABLET | Refills: 0 | Status: SHIPPED | OUTPATIENT
Start: 2022-04-26 | End: 2022-05-18

## 2022-04-26 RX ORDER — PREGABALIN 100 MG/1
100 CAPSULE ORAL 2 TIMES DAILY
Qty: 60 CAPSULE | Refills: 2 | Status: SHIPPED | OUTPATIENT
Start: 2022-04-26 | End: 2022-04-26

## 2022-04-26 RX ORDER — PREGABALIN 100 MG/1
100 CAPSULE ORAL 2 TIMES DAILY
Qty: 60 CAPSULE | Refills: 2 | Status: ON HOLD | OUTPATIENT
Start: 2022-04-26 | End: 2022-10-20

## 2022-04-26 NOTE — PROGRESS NOTES
1. Have you been to the ER, urgent care clinic since your last visit? Hospitalized since your last visit? No    2. Have you seen or consulted any other health care providers outside of the [address] [place] [address] since your last visit? Include any pap smears or colon screening.  No    Chief Complaint   Patient presents with    Back Pain     Thoracic and Lumbar CT Results 3/22/22

## 2022-05-02 NOTE — PROGRESS NOTES
Cristhian Buchanan (: 1955) is a 74 y.o. female patient here for evaluation of the following chief complaint(s):  Back Pain (Thoracic and Lumbar CT Results 3/22/22)         ASSESSMENT/PLAN:  Below is the assessment and plan developed based on review of pertinent history, physical exam, labs, studies, and medications. 1. S/P lumbar fusion  -     REFERRAL TO PHYSICAL THERAPY  -     pregabalin (Lyrica) 100 mg capsule; Take 1 Capsule by mouth two (2) times a day. Max Daily Amount: 200 mg., Normal, Disp-60 Capsule, R-2      The patient's radiologic findings have been reviewed with her in detail today. She has persistent midthoracic pain with radiation of the left thoracic cage which is residual from her multiple rib fractures and subsequent ORIF of these fractures. She has been cleared from a fracture standpoint. She continues also with bilateral buttock pain and left lower extremity radiculopathy. I would like for her to begin with a formal course of outpatient physical therapy. She will stop gabapentin and start with Lyrica and will also try Flexeril for muscle spasms. She will return for follow-up visit in 2 to 3 months. Return in about 3 months (around 2022) for PT follow up. SUBJECTIVE/OBJECTIVE:  Cristhian Buchanan (: 1955) is a 74 y.o. female who presents today for the following:  Chief Complaint   Patient presents with    Back Pain     Thoracic and Lumbar CT Results 3/22/22        HPI   Ms. Shaye Williamson returns today for follow-up of her recent CT scan of the thoracic and lumbar spine. She continues with left-sided thoracic pain which is likely related to her recent multiple left rib fractures. She continues with bilateral buttock pain and left leg pain. She continues with bilateral foot tingling that persists from prior to her surgery. She is approximately 6 months out from her recent posterior lumbar fusion at L4-5.   She continues with gabapentin 3 times per day without any significant relief. She has been taking a muscle relaxant recently without any substantial relief. IMAGING:  XR Results (most recent):  Results from Appointment encounter on 03/22/22    XR [address] 3 V    Narrative  AP and lateral views of the thoracic spine reveal mild disc degeneration throughout thoracic spine, as well as questionable mild compression fractures in the mid-thoracic region. MRI Results (most recent):  No results found for this or any previous visit. CT Scan Thoracic Spine 3/29/22    EXAM:  CT SPINE [facility] WO CONT      INDICATION: Fall in February/back pain     COMPARISON: The radiographic examination of the thoracic spine of 3/22/2022 is  available for correlation.     TECHNIQUE:   Multislice helical CT of the thoracic spine was performed. Sagittal and coronal  reformations were generated. CT dose reduction was achieved through use of a  standardized protocol tailored for this examination and automatic exposure  control for dose modulation.     FINDINGS:  There is evidence of thoracic scoliosis convex to the right. There is evidence  of thoracic spondylosis. The thoracic vertebral bodies are of normal height. No  thoracic vertebral compression fractures/deformities are seen. The spinal canal  in the thoracic region is patent.     IMPRESSION  1. No evidence of traumatic injury involving the thoracic spine. 2. Evidence of thoracic scoliosis convex to the right. 3. Evidence of thoracic spondylosis. The intervertebral disc spaces are  well-maintained. CT Scan Lumbar Spine 3/29/22  EXAM:  CT LUMBAR SPINE WITHOUT  CONTRAST     INDICATION: . Arthrodesis status     COMPARISON: None.     CONTRAST:  None.     TECHNIQUE: Multislice helical CT of the lumbar spine was performed without  intravenous contrast administration. Coronal and sagittal reformats were  generated.   CT dose reduction was achieved through use of a standardized  protocol tailored for this examination and automatic exposure control for dose  modulation.      FINDINGS:     There is evidence of mild lumbar scoliosis convex to the left. There is evidence  of degenerative/arthritic change. Postsurgical change is noted at the L4 and the  L5 levels. Specifically, metallic Garcia rods are noted posteriorly. A grade  1 spondylolisthesis of L4 on L5 is present. This is appreciated on sagittal  image 44. There is evidence of facet hypertrophy of the mid/lower lumbar region.     IMPRESSION  Evidence of postsurgical/degenerative/scoliotic/osteopenic change involving the  lumbar spine as described above. Allergies   Allergen Reactions    Morphine Nausea Only    Nitrofurantoin Hives    Sulfa (Sulfonamide Antibiotics) Nausea Only and Unknown (comments)    Macrobid [Nitrofurantoin Monohyd/M-Cryst] Hives       Current Outpatient Medications   Medication Sig    pregabalin (Lyrica) 100 mg capsule Take 1 Capsule by mouth two (2) times a day. Max Daily Amount: 200 mg.  cyclobenzaprine (FLEXERIL) 10 mg tablet Take 1 Tablet by mouth two (2) times daily as needed for Muscle Spasm(s).  gabapentin (NEURONTIN) 300 mg capsule Take 1 Capsule by mouth three (3) times daily. Max Daily Amount: 900 mg.    diazePAM (VALIUM) 5 mg tablet Take 5 mg by mouth every eight (8) hours as needed.  docusate sodium (COLACE) 100 mg capsule TAKE 1 CAPSULE TWICE A DAY    docusate sodium (COLACE) 100 mg capsule Take 100 mg by mouth two (2) times a day.  oxyCODONE IR (ROXICODONE) 10 mg tab immediate release tablet Take 10 mg by mouth.  gabapentin (NEURONTIN) 400 mg capsule TAKE 400 MG BY MOUTH EVERY 8 HOURS    acetaminophen (TylenoL) 325 mg tablet Take  by mouth every four (4) hours as needed for Pain.  gabapentin (Neurontin) 300 mg capsule Take 1 Capsule by mouth nightly.  Max Daily Amount: 300 mg.    albuterol (PROVENTIL HFA, VENTOLIN HFA, PROAIR HFA) 90 mcg/actuation inhaler Take 2 Puffs by inhalation every six (6) hours as needed for Wheezing.  multivitamin (ONE A DAY) tablet Take 1 Tablet by mouth daily.  b complex vitamins tablet Take 1 Tablet by mouth daily.  cholecalciferol (Vitamin D3) (1000 Units /25 mcg) tablet Take  by mouth daily.  levothyroxine (SYNTHROID) 175 mcg tablet Take 1 Tablet by mouth Daily (before breakfast).  lisinopriL (PRINIVIL, ZESTRIL) 20 mg tablet Take 1 Tablet by mouth daily. Indications: high blood pressure    hydroCHLOROthiazide (HYDRODIURIL) 12.5 mg tablet Take 1 Tablet by mouth daily. Indications: high blood pressure    melatonin 5 mg tablet Take  by mouth nightly. Takes 2 at bedtime    aspirin 81 mg chewable tablet Take 81 mg by mouth daily. No current facility-administered medications for this visit.        Past Medical History:   Diagnosis Date    Anxiety     Arthritis     Cataract     Chronic obstructive pulmonary disease (HCC)     Chronic pain of right groin     after multiple complications from bladder sling that became imbedded    Depression     GERD (gastroesophageal reflux disease)     HTN (hypertension)     Lobular carcinoma in situ of breast 2009    right breast bx LCIS    Lumbar stenosis with neurogenic claudication 10/15/2021    Pneumonia     Spondylisthesis 10/15/2021    Thyroid disease     UTI (urinary tract infection)         Past Surgical History:   Procedure Laterality Date    HX ADENOIDECTOMY  1960    HX BLADDER SUSPENSION      Removed     HX BREAST BIOPSY Right 2009    LCIS    HX BREAST BIOPSY Left 2000's    punch bx benign had to be put under    HX CATARACT REMOVAL Bilateral     HX HYSTERECTOMY  2015    HX LYMPHADENECTOMY      HX OTHER SURGICAL  02/03/2022    plate inchest to stabalize fractured ribs    HX OTHER SURGICAL  11-    spinal infusion    HX TONSILLECTOMY  1960    HX UROLOGICAL  2014    MESH ,SLING PLACED     HX UROLOGICAL  2016    REMOVAL OF SLING     HX UROLOGICAL  2014    CYSTOCCELE/ RECTOCELE     IN BIOPSY OF BREAST, INCISIONAL Bilateral        Family History   Problem Relation Age of Onset    Cancer Mother         BREAST     Breast Cancer Mother         dx age 48    Alcohol abuse Father     Breast Cancer Sister         dx age 48   Apple Anesth Problems Neg Hx         Social History     Tobacco Use    Smoking status: Current Every Day Smoker     Packs/day: 1.00     Years: 48.00     Pack years: 48.00     Types: Cigarettes    Smokeless tobacco: Never Used   Substance Use Topics    Alcohol use: Yes     Comment: rarely        Review of Systems   Constitutional: Negative. Respiratory: Negative. Cardiovascular: Negative. Gastrointestinal: Negative. Endocrine: Negative. Genitourinary: Negative. Musculoskeletal: Positive for back pain. Skin: Negative. Allergic/Immunologic: Negative. Neurological: Positive for numbness. Hematological: Negative. Psychiatric/Behavioral: Negative. All other systems reviewed and are negative. No flowsheet data found. Vitals:  Ht 5' 6\" (1.676 m)   Wt 162 lb (73.5 kg)   BMI 26.15 kg/m²    Body mass index is 26.15 kg/m². Physical Exam    Integumentary  Assessment of Surgical Incision - healing and consistent with normal anticipated wound healing. Neurologic  Sensory  Light Touch - Intact - Globally. Overall Assessment of Muscle Strength and Tone reveals  Lower Extremities - Right Iliopsoas - 5/5. Left Iliopsoas - 5/5. Right Tibialis Anterior - 5/5. Left Tibialis Anterior - 5/5. Right Gastroc-Soleus - 5/5. Left Gastroc-Soleus - 5/5. Right EHL - 5/5. Left EHL - 5/5. General Assessment of Reflexes  Right Ankle - Clonus is not present. Left Ankle - Clonus is not present. Reflexes (Dermatomes)  2/2 Normal - Left Achilles (L5-S2), Left Knee (L2-4), Right Achilles (L5-S2) and Right Knee (L2-4).     Musculoskeletal  Global Assessment  Examination of related systems reveals - well-developed, well-nourished, in no acute distress, alert and oriented x 3 and normal coordination. Spine/Ribs/Pelvis  Lumbosacral Spine - Examination of the lumbosacral spine reveals - no known fractures or deformities. Inspection and Palpation - Tenderness - moderate. Assessment of pain reveals the following findings - The pain is characterized as - moderate. Location - pain refers to lower back bilaterally. Dr. Zulay Gonzalez was available for immediate consult during this encounter. An electronic signature was used to authenticate this note.   -- ARIAN Lopez

## 2022-05-09 NOTE — PROGRESS NOTES
Patient verified by stating name and date of birth. Patient informed of CXR results and states understanding. Per written order by Dr. Edwards, \"PSA.\"    Order placed and appt made.

## 2022-05-17 ENCOUNTER — HOSPITAL ENCOUNTER (OUTPATIENT)
Dept: PHYSICAL THERAPY | Age: 67
Discharge: HOME | End: 2022-05-17
Payer: MEDICARE

## 2022-05-17 NOTE — PROGRESS NOTES
11:10-12:30    Patient came into therapy very tearful today for her evaluation, she had marked on her intake form\" maybe\" for the question \"Would you like to speak to someone regarding suicide\" . Patient said that she sometimes thinks about going to sleep and not waking up, and does not want to get out of bed, wants to talk to a psychiatrist.  Per hospital policy, C-SSRS  screening form completed and patient was found to be at low risk. Contacted Venice Rivera, [facility] manager, and informed her we had a patient that scored low on the C-SSRS tool and she advised that patient contact outpatient [facility]. Patient was given the number to outpatient [facility] to call for an appointment. Also attempted to give patient the [address] however, patient said she did not need that number. Contacted patient's  outpatient therapist Orestes Engle and patient has an appointment with her on 5/25/22. Contacted patients PCP to make her aware of the situation. Will complete PT evaluation 5/19/22.

## 2022-05-18 ENCOUNTER — VIRTUAL VISIT (OUTPATIENT)
Dept: FAMILY MEDICINE CLINIC | Age: 67
End: 2022-05-18
Payer: MEDICARE

## 2022-05-18 DIAGNOSIS — F41.8 DEPRESSION WITH ANXIETY: Primary | ICD-10-CM

## 2022-05-18 DIAGNOSIS — Z12.11 SCREENING FOR COLON CANCER: ICD-10-CM

## 2022-05-18 DIAGNOSIS — K21.9 GASTROESOPHAGEAL REFLUX DISEASE WITHOUT ESOPHAGITIS: ICD-10-CM

## 2022-05-18 PROCEDURE — G2025 DIS SITE TELE SVCS RHC/FQHC: HCPCS

## 2022-05-18 RX ORDER — SERTRALINE HYDROCHLORIDE 50 MG/1
50 TABLET, FILM COATED ORAL DAILY
Qty: 30 TABLET | Refills: 0 | Status: SHIPPED | OUTPATIENT
Start: 2022-05-18 | End: 2022-06-13 | Stop reason: SDUPTHER

## 2022-05-18 NOTE — PROGRESS NOTES
Chief Complaint   Patient presents with    Depression   1. \"Have you been to the ER, urgent care clinic since your last visit? Hospitalized since your last visit? \"  no    2. \"Have you seen or consulted any other health care providers outside of the [address] [place] [address] since your last visit? \"  no    3. For patients aged 39-70: Has the patient had a colonoscopy / FIT/ Cologuard? Yes need to get been awhile per patient      If the patient is female:    4. For patients aged 41-77: Has the patient had a mammogram within the past 2 years? Yes No Care Gap      5. For patients aged 21-65: Has the patient had a pap smear? Yes no care Gap  Abuse Screening Questionnaire 5/18/2022   Do you ever feel afraid of your partner? N   Are you in a relationship with someone who physically or mentally threatens you? N   Is it safe for you to go home?  Y     3 most recent PHQ Screens 5/18/2022   PHQ Not Done -   Little interest or pleasure in doing things Nearly every day   Feeling down, depressed, irritable, or hopeless Nearly every day   Total Score PHQ 2 6   Trouble falling or staying asleep, or sleeping too much Nearly every day   Feeling tired or having little energy Nearly every day   Poor appetite, weight loss, or overeating Nearly every day   Feeling bad about yourself - or that you are a failure or have let yourself or your family down Nearly every day   Trouble concentrating on things such as school, work, reading, or watching TV Nearly every day   Moving or speaking so slowly that other people could have noticed; or the opposite being so fidgety that others notice Nearly every day   Thoughts of being better off dead, or hurting yourself in some way Several days   PHQ 9 Score 25   How difficult have these problems made it for you to do your work, take care of your home and get along with others Very difficult

## 2022-05-18 NOTE — PROGRESS NOTES
Meagan Patel (: 1955) is a 74 y.o. female, established patient, here for evaluation of the following chief complaint(s):   Depression       ASSESSMENT/PLAN:  Below is the assessment and plan developed based on review of pertinent history, labs, studies, and medications. 1. Depression with anxiety  -     sertraline (ZOLOFT) 50 mg tablet; Take 1 Tablet by mouth daily. , Normal, Disp-30 Tablet, R-0  2. Gastroesophageal reflux disease without esophagitis  -     REFERRAL TO GENERAL SURGERY  3. Screening for colon cancer  -     REFERRAL TO GENERAL SURGERY    Patient educated as below:    · Will titrate gradually depending on the response. · Patient will be seen or communicate within a few weeks their progress. · Risks, benefits, and side effects of medications were reviewed and discussed in detail including the black box warning about the potential for increased suicide risk among adolescents treated with these medications. · Patient agreed to alert others and to seek help promptly if they would experience any intensification of their previous passive thoughts of life not being worth living. · Patient agreed that the potential benefit from a medication trial outweighs the acknowledged risks. · Patient advised that if feeling that they feel they would hurt themselves, they should call someone or go to the ED/Call 911 IMMEDIATELY. · Suicide hotline number 3-535-197-COPE     Discussed expected benefits vs possible side effects of SSRIs. Explained maximal effect may not be noted for 6 weeks. Discussed possibility of increased suicidal tendencies during onset of action. Patient contracts for safety and can identify an accessible social support network. Patient underdstands that medication is more effective when partnered with counseling. Follow up appointment scheduled for 2 weeks. Return in about 2 weeks (around 2022) for with PCP.     SUBJECTIVE/OBJECTIVE:  Meagan Patel presents for an acute visit. Notes ongoing depression and anxiety her \"whole life\"; has been on multiple different antidepressants with varying degrees of success. Feelings of sadness and hopelessness worse for the past 5 years since the death of her daughter and other family members, as well as her own declining health. Denies SI, HI, hallucinations; reports some paranoia and panic associated with anxiety and agoraphobia. Is also interested in screening colonoscopy and EGD for assessment of GERD sx. Review of Systems   Constitutional: Negative for chills and fatigue. Respiratory: Negative. Negative for cough and shortness of breath. Cardiovascular: Negative for chest pain and palpitations. Neurological: Negative for dizziness and headaches. Psychiatric/Behavioral: Positive for dysphoric mood. Negative for agitation, hallucinations and suicidal ideas. The patient is nervous/anxious. On this date 05/18/2022 I have spent 30 minutes reviewing previous notes, test results and face to face (virtual) with the patient discussing the diagnosis and importance of compliance with the treatment plan as well as documenting on the day of the visit. Marybeth Kitchen, was evaluated through a synchronous (real-time) audio encounter. The patient (or guardian if applicable) is aware that this is a billable service, which includes applicable co-pays. Verbal consent to proceed has been obtained. The visit was conducted pursuant to the emergency declaration under the [address] [place] [address], [address] [place] [address] authority and the [employer] General Act. Patient identification was verified, and a caregiver was present when appropriate. The patient was located at home in a state where the provider was licensed to provide care. An electronic signature was used to authenticate this note.   -- Gildardo Lamas NP .

## 2022-05-18 NOTE — PATIENT INSTRUCTIONS
Anxiety Disorder: Care Instructions  Your Care Instructions     Anxiety is a normal reaction to stress. Difficult situations can cause you to have symptoms such as sweaty palms and a nervous feeling. In an anxiety disorder, the symptoms are far more severe. Constant worry, muscle tension, trouble sleeping, nausea and diarrhea, and other symptoms can make normal daily activities difficult or impossible. These symptoms may occur for no reason, and they can affect your work, school, or social life. Medicines, counseling, and self-care can all help. Follow-up care is a key part of your treatment and safety. Be sure to make and go to all appointments, and call your doctor if you are having problems. It's also a good idea to know your test results and keep a list of the medicines you take. How can you care for yourself at home? · Take medicines exactly as directed. Call your doctor if you think you are having a problem with your medicine. · Go to your counseling sessions and follow-up appointments. · Recognize and accept your anxiety. Then, when you are in a situation that makes you anxious, say to yourself, \"This is not an emergency. I feel uncomfortable, but I am not in danger. I can keep going even if I feel anxious. \"  · Be kind to your body:  ? Relieve tension with exercise or a massage. ? Get enough rest.  ? Avoid alcohol, caffeine, nicotine, and illegal drugs. They can increase your anxiety level and cause sleep problems. ? Learn and do relaxation techniques. See below for more about these techniques. · Engage your mind. Get out and do something you enjoy. Go to a funny movie, or take a walk or hike. Plan your day. Having too much or too little to do can make you anxious. · Keep a record of your symptoms. Discuss your fears with a good friend or family member, or join a support group for people with similar problems. Talking to others sometimes relieves stress.   · Get involved in social groups, or volunteer to help others. Being alone sometimes makes things seem worse than they are. · Get at least 30 minutes of exercise on most days of the week to relieve stress. Walking is a good choice. You also may want to do other activities, such as running, swimming, cycling, or playing tennis or team sports. Relaxation techniques  Do relaxation exercises 10 to 20 minutes a day. You can play soothing, relaxing music while you do them, if you wish. · Tell others in your house that you are going to do your relaxation exercises. Ask them not to disturb you. · Find a comfortable place, away from all distractions and noise. · Lie down on your back, or sit with your back straight. · Focus on your breathing. Make it slow and steady. · Breathe in through your nose. Breathe out through either your nose or mouth. · Breathe deeply, filling up the area between your navel and your rib cage. Breathe so that your belly goes up and down. · Do not hold your breath. · Breathe like this for 5 to 10 minutes. Notice the feeling of calmness throughout your whole body. As you continue to breathe slowly and deeply, relax by doing the following for another 5 to 10 minutes:  · Tighten and relax each muscle group in your body. You can begin at your toes and work your way up to your head. · Imagine your muscle groups relaxing and becoming heavy. · Empty your mind of all thoughts. · Let yourself relax more and more deeply. · Become aware of the state of calmness that surrounds you. · When your relaxation time is over, you can bring yourself back to alertness by moving your fingers and toes and then your hands and feet and then stretching and moving your entire body. Sometimes people fall asleep during relaxation, but they usually wake up shortly afterward. · Always give yourself time to return to full alertness before you drive a car or do anything that might cause an accident if you are not fully alert.  Never play a relaxation tape while you drive a car. When should you call for help? Call 911 anytime you think you may need emergency care. For example, call if:    · You feel you cannot stop from hurting yourself or someone else. Keep the numbers for these national suicide hotlines: 6-735-705-TALK (9-873.444.7752) and 3-254-MVTEILU (5-918.122.8682). If you or someone you know talks about suicide or feeling hopeless, get help right away. Watch closely for changes in your health, and be sure to contact your doctor if:    · You have anxiety or fear that affects your life.     · You have symptoms of anxiety that are new or different from those you had before. Where can you learn more? Go to http://www.cervantes.com/  Enter P754 in the search box to learn more about \"Anxiety Disorder: Care Instructions. \"  Current as of: June 16, 2021               Content Version: 13.2  © 2006-2022 [employer]. Care instructions adapted under license by [employer] (which disclaims liability or warranty for this information). If you have questions about a medical condition or this instruction, always ask your healthcare professional. [place] [address] any warranty or liability for your use of this information. Generalized Anxiety Disorder: Care Instructions  Overview     We all worry. It's a normal part of life. But when you have generalized anxiety disorder, you worry about lots of things. You have a hard time not worrying. This worry or anxiety interferes with your relationships, work or school, and other areas of your life. You may worry most days about things like money, health, work, or friends. That may make you feel tired, tense, or cranky. It can make it hard to think. It may get in the way of healthy sleep. Counseling and medicine can both work to treat anxiety. They are often used together with lifestyle changes, such as getting enough sleep.  Treatment can include a type of counseling called cognitive-behavioral therapy, or CBT. It helps you notice and replace thoughts that make you worry. You also might have counseling along with those closest to you so that they can help. Follow-up care is a key part of your treatment and safety. Be sure to make and go to all appointments, and call your doctor if you are having problems. It's also a good idea to know your test results and keep a list of the medicines you take. How can you care for yourself at home? · Get at least 30 minutes of exercise on most days of the week. Walking is a good choice. You also may want to do other activities, such as running, swimming, cycling, or playing tennis or team sports. · Learn and do relaxation exercises, such as deep breathing. · Go to bed at the same time every night. Try for 8 to 10 hours of sleep a night. · Avoid alcohol, marijuana, and illegal drugs. · Find a counselor who uses cognitive-behavioral therapy (CBT). · Don't isolate yourself. Let those closest to you help you. Find someone you can trust and confide in. Talk to that person. · Be safe with medicines. Take your medicines exactly as prescribed. Call your doctor if you think you are having a problem with your medicine. · Practice healthy thinking. How you think can affect how you feel and act. Ask yourself if your thoughts are helpful or unhelpful. If they are unhelpful, you can learn how to change them. · Recognize and accept your anxiety. When you feel anxious, say to yourself, \"This is not an emergency. I feel uncomfortable, but I am not in danger. I can keep going even if I feel anxious. \"  When should you call for help? Call 911  anytime you think you may need emergency care. For example, call if:    · You feel you can't stop from hurting yourself or someone else. Keep the numbers for these national suicide hotlines: 1-783-261-TALK (7-179.364.1225) and 4-469-PPEBDAT (2-199.740.8297).  If you or someone you know talks about suicide or feeling hopeless, get help right away. Call your doctor or counselor now or seek immediate medical care if:    · You have new anxiety, or your anxiety gets worse.     · You have been feeling sad, depressed, or hopeless or have lost interest in things that you usually enjoy.     · You do not get better as expected. Where can you learn more? Go to http://www.gray.com/  Enter G123 in the search box to learn more about \"Generalized Anxiety Disorder: Care Instructions. \"  Current as of: June 16, 2021               Content Version: 13.2  © 2245-1047 [employer]. Care instructions adapted under license by [employer] (which disclaims liability or warranty for this information). If you have questions about a medical condition or this instruction, always ask your healthcare professional. [place] [address] any warranty or liability for your use of this information. Depression Treatment: Care Instructions  Your Care Instructions     Depression is a condition that affects the way you feel, think, and act. It causes symptoms such as low energy, loss of interest in daily activities, and sadness or grouchiness that goes on for a long time. Depression is very common and affects men and women of all ages. Depression is a medical illness caused by changes in the natural chemicals in your brain. It is not a character flaw, and it does not mean that you are a bad or weak person. It does not mean that you are going crazy. It is important to know that depression can be treated. Medicines, counseling, and self-care can all help. Many people do not get help because they are embarrassed or think that they will get over the depression on their own. But some people do not get better without treatment. Follow-up care is a key part of your treatment and safety. Be sure to make and go to all appointments, and call your doctor if you are having problems. It's also a good idea to know your test results and keep a list of the medicines you take. How can you care for yourself at home? Learn about antidepressant medicines  Antidepressant medicines can improve or end the symptoms of depression. You may need to take the medicine for at least 6 months, and often longer. Keep taking your medicine even if you feel better. If you stop taking it too soon, your symptoms may come back or get worse. You may start to feel better within 1 to 3 weeks of taking antidepressant medicine. But it can take as many as 6 to 8 weeks to see more improvement. Talk to your doctor if you have problems with your medicine or if you do not notice any improvement after 3 weeks. Antidepressants can make you feel tired, dizzy, or nervous. Some people have dry mouth, constipation, headaches, sexual problems, an upset stomach, or diarrhea. Many of these side effects are mild and go away on their own after you take the medicine for a few weeks. Some may last longer. Talk to your doctor if side effects bother you too much. You might be able to try a different medicine. If you are pregnant or breastfeeding, talk to your doctor about what medicines you can take. Learn about counseling  In many cases, counseling can work as well as medicines to treat mild to moderate depression. Counseling is done by licensed mental health providers, such as psychologists, social workers, and some types of nurses. It can be done in one-on-one sessions or in a group setting. Many people find group sessions helpful. Cognitive-behavioral therapy is a type of counseling. In this treatment, you learn how to see and change unhelpful thinking styles that may be adding to your depression. Counseling and medicines often work well when used together. When should you call for help? Call 911 anytime you think you may need emergency care. For example, call if:    · You feel you cannot stop from hurting yourself or someone else. Call your doctor now or seek immediate medical care if:    · You hear voices.     · You feel much more depressed. Watch closely for changes in your health, and be sure to contact your doctor if:    · You are having problems with your depression medicine.     · You are not getting better as expected. Where can you learn more? Go to http://www.gray.com/  Enter G693 in the search box to learn more about \"Depression Treatment: Care Instructions. \"  Current as of: June 16, 2021               Content Version: 13.2  © 2161-0596 [employer]. Care instructions adapted under license by [employer] (which disclaims liability or warranty for this information). If you have questions about a medical condition or this instruction, always ask your healthcare professional. [address] any warranty or liability for your use of this information.

## 2022-05-19 ENCOUNTER — HOSPITAL ENCOUNTER (OUTPATIENT)
Dept: PHYSICAL THERAPY | Age: 67
Discharge: HOME | End: 2022-05-19
Payer: MEDICARE

## 2022-05-19 PROCEDURE — 97110 THERAPEUTIC EXERCISES: CPT

## 2022-05-19 PROCEDURE — 97162 PT EVAL MOD COMPLEX 30 MIN: CPT

## 2022-05-19 NOTE — PROGRESS NOTES
[facility]  [address] [facility] [place], [address] [facility]:  [phone]  FAX: [phone]    Plan of Care/Statement of Necessity for Physical Therapy Services  2-15    Patient name: Cyril Bell  : 1955  Provider#: [id number]  Referral source: Juan Liang MD      Medical/Treatment Diagnosis: Other low back pain [M54.59]     Prior Hospitalization: see medical history     Comorbidities: depression, thyroid problems, alcohol use, tobacco use, HTN, Cancer  Prior Level of Function: active, drives, ambulates independently  Medications: Verified on Patient Summary List  Start of Care: 22      Onset Date: 21   The Plan of Care and following information is based on the information from the initial evaluation. Assessment/ key information: Patient presents with limited mobility and strength, and pain and radicular symptoms, S/P lumbar fixation (21) and rib fx with surgical fixation repair (22). Patient will benefit from skilled PT to increase her strength and mobility and decrease her pain. Evaluation Complexity History MEDIUM  Complexity : 1-2 comorbidities / personal factors will impact the outcome/ POC ; Examination MEDIUM Complexity : 3 Standardized tests and measures addressing body structure, function, activity limitation and / or participation in recreation  ;Presentation MEDIUM Complexity : Evolving with changing characteristics  ; Clinical Decision Making MEDIUM Complexity : FOTO score of 26-74  Overall Complexity Rating: MEDIUM    Problem List: pain affecting function, decrease ROM, decrease strength and decrease activity tolerance   Treatment Plan may include any combination of the following: Therapeutic exercise, Therapeutic activities, Neuromuscular re-education, Physical agent/modality, Gait/balance training, Manual therapy and Patient education  Patient / Family readiness to learn indicated by: asking questions, trying to perform skills and interest  Persons(s) to be included in education: patient (P)  Barriers to Learning/Limitations: None  Patient Goal (s): back to normal, not have numbness and tingling in LEs  Patient Self Reported Health Status: fair  Rehabilitation Potential: good    Short Term Goals: To be accomplished in 8 treatments:  1. Patient will be independent in a HEP to decrease her pain and increase her overall mobility. 2.  Patient will report she is able to perform activities around her home without difficulty. 3.  Patient will report her worst pain with activities is a 2/10. Long Term Goals: To be accomplished in 16 treatments:  1. Patient will report she is able to return to all prior activities without difficulty. 2.  Patient will report she is able to return to all prior activities without pain. 3.  Patient will report she has less numbness and tingling in her LEs at rest and with activitieis. Frequency / Duration: Patient to be seen 1-2 times per week for 16 treatments. Patient/ Caregiver education and instruction: exercises    [x]  Plan of care has been reviewed with PTA    The severity rating is based on clinical judgment and the FOTO Score score. Certification Period: 5/19/22-8/19/22  Meek Yap, PT 5/19/2022   ________________________________________________________________________    I certify that the above Therapy Services are being furnished while the patient is under my care. I agree with the treatment plan and certify that this therapy is necessary. Physician's Signature:____________________  Date:____________Time: _________           Priscila Kumar MD    Please sign and return to: [facility]  [address] [place], [place], [address] [facility]:  [address] [place] [address] Street: [phone]

## 2022-05-19 NOTE — PROGRESS NOTES
PT INITIAL EVALUATION NOTE - [facility] 2-15    Patient Name: Raymond Lagos  Date:2022  : 1955  [x]  Patient  Verified  Payor: Prieto Sahu / Plan: [place] MED[place] PART A & B / Product Type: Medicare /    In time:930  Out time:1015  Total Treatment Time (min): 45  Total Timed Codes (min): 45  1:1 Treatment Time ( [address] only): 45   Visit #: 1     Treatment Area: Other low back pain [M54.59]    SUBJECTIVE  Pain Level (0-10 scale): 2-310  Any medication changes, allergies to medications, adverse drug reactions, diagnosis change, or new procedure performed?: []? No    [x]? Yes (see summary sheet for update)  Subjective:    Lumbar surgery 21, fell down steps and fx L ribs 22- plates/trauma surgery. Currently pain R lumbar sacral into RLE, feet and LEs feel numb and tiggling. Takes ibuprofin, tylenol, medical marijuana. Patient reports she is depressed. Patient worked in a [employer] lab for 26 years. Between  and - daughter  in 2018 of overdose, love of life  of Cancer 2019, moved here from [place] 2020, pt was more isolated, son is living with her and has mental health issues, brother in [address], patient tearful, patient is weak, at times does not want to do anything, she feels she needs to speak with a psychiatrist.  Patient has a history of depression, anxiety, and panic attacks  Patient stays in bed all day, stays on phone and watches TV  Patient reports she does not have any interests now    Patient reports with her state of mind she would like to see a psychiatrist and have someone evaluate her meds.     PLOF: ambulatory, drives  PMHx/Surgical Hx: refer to note  Pt Goals: back to normal, not have numbness and tingling in LEs        OBJECTIVE/EXAMINATION  Posture:  decreased lumbar lordosis, increased thoracic kyphosis, stands with posterior pelvic tilt  Other Observations:  healed left thoracic scar laterally T3-T12, healed lumbar scar L1-L5  Gait and Functional Mobility:  ambulates independently without a device, decreased stance and step length R LE  Palpation: minimal tenderness lumbar paraspinals, L ribs/flank region   Lumbar AROM:50% throughout, functional   MMT: RLE grossly 4-/5, LLE grossly 4/5  Neurological: Reflexes / Sensations: diminished sensation R lateral calf      25 min Therapeutic Exercise:  [x] See flow sheet :   Rationale: increase strength to improve the patients ability to perform functional activities              With   [] TE   [] TA   [] neuro   [] other: Patient Education: [x] Review HEP    [] Progressed/Changed HEP based on:   [] positioning   [] body mechanics   [] transfers   [] heat/ice application    [] other:      Other Objective/Functional Measures:FOTO score 53    Pain Level (0-10 scale) post treatment: 2-3    ASSESSMENT/Changes in Function:     [x]  See Plan of [address], PT 5/19/2022

## 2022-05-20 ENCOUNTER — HOSPITAL ENCOUNTER (EMERGENCY)
Age: 67
Discharge: HOME | End: 2022-05-20
Attending: EMERGENCY MEDICINE
Payer: MEDICARE

## 2022-05-20 VITALS
WEIGHT: 160 LBS | HEART RATE: 72 BPM | BODY MASS INDEX: 25.71 KG/M2 | SYSTOLIC BLOOD PRESSURE: 147 MMHG | DIASTOLIC BLOOD PRESSURE: 87 MMHG | HEIGHT: 66 IN | OXYGEN SATURATION: 97 % | TEMPERATURE: 98.5 F | RESPIRATION RATE: 18 BRPM

## 2022-05-20 DIAGNOSIS — R10.9 ABDOMINAL PAIN, UNSPECIFIED ABDOMINAL LOCATION: ICD-10-CM

## 2022-05-20 DIAGNOSIS — R53.83 FATIGUE, UNSPECIFIED TYPE: Primary | ICD-10-CM

## 2022-05-20 LAB
ALBUMIN SERPL-MCNC: 3.6 G/DL (ref 3.5–5)
ALBUMIN/GLOB SERPL: 1 {RATIO} (ref 1.1–2.2)
ALP SERPL-CCNC: 82 U/L (ref 45–117)
ALT SERPL-CCNC: 15 U/L (ref 12–78)
ANION GAP SERPL CALC-SCNC: 11 MMOL/L (ref 5–15)
APPEARANCE UR: CLEAR
AST SERPL-CCNC: 11 U/L (ref 15–37)
BACTERIA URNS QL MICRO: NEGATIVE /HPF
BASOPHILS # BLD: 0.1 K/UL (ref 0–0.1)
BASOPHILS NFR BLD: 1 % (ref 0–1)
BILIRUB SERPL-MCNC: 0.4 MG/DL (ref 0.2–1)
BILIRUB UR QL: NEGATIVE
BUN SERPL-MCNC: 13 MG/DL (ref 6–20)
BUN/CREAT SERPL: 23 (ref 12–20)
CALCIUM SERPL-MCNC: 9.3 MG/DL (ref 8.5–10.1)
CHLORIDE SERPL-SCNC: 104 MMOL/L (ref 97–108)
CO2 SERPL-SCNC: 27 MMOL/L (ref 21–32)
COLOR UR: NORMAL
CREAT SERPL-MCNC: 0.56 MG/DL (ref 0.55–1.02)
DIFFERENTIAL METHOD BLD: NORMAL
EOSINOPHIL # BLD: 0.2 K/UL (ref 0–0.4)
EOSINOPHIL NFR BLD: 2 % (ref 0–7)
EPITH CASTS URNS QL MICRO: NORMAL /LPF
ERYTHROCYTE [DISTWIDTH] IN BLOOD BY AUTOMATED COUNT: 13.5 % (ref 11.5–14.5)
GLOBULIN SER CALC-MCNC: 3.5 G/DL (ref 2–4)
GLUCOSE SERPL-MCNC: 103 MG/DL (ref 65–100)
GLUCOSE UR STRIP.AUTO-MCNC: NEGATIVE MG/DL
HCT VFR BLD AUTO: 41.6 % (ref 35–47)
HGB BLD-MCNC: 14.6 G/DL (ref 11.5–16)
HGB UR QL STRIP: NEGATIVE
IMM GRANULOCYTES # BLD AUTO: 0 K/UL (ref 0–0.04)
IMM GRANULOCYTES NFR BLD AUTO: 0 % (ref 0–0.5)
KETONES UR QL STRIP.AUTO: NEGATIVE MG/DL
LEUKOCYTE ESTERASE UR QL STRIP.AUTO: NEGATIVE
LIPASE SERPL-CCNC: 90 U/L (ref 73–393)
LYMPHOCYTES # BLD: 2.3 K/UL (ref 0.8–3.5)
LYMPHOCYTES NFR BLD: 31 % (ref 12–49)
MAGNESIUM SERPL-MCNC: 2 MG/DL (ref 1.6–2.4)
MCH RBC QN AUTO: 32.3 PG (ref 26–34)
MCHC RBC AUTO-ENTMCNC: 35.1 G/DL (ref 30–36.5)
MCV RBC AUTO: 92 FL (ref 80–99)
MONOCYTES # BLD: 0.5 K/UL (ref 0–1)
MONOCYTES NFR BLD: 7 % (ref 5–13)
NEUTS SEG # BLD: 4.4 K/UL (ref 1.8–8)
NEUTS SEG NFR BLD: 59 % (ref 32–75)
NITRITE UR QL STRIP.AUTO: NEGATIVE
NRBC # BLD: 0 K/UL (ref 0–0.01)
NRBC BLD-RTO: 0 PER 100 WBC
PH UR STRIP: 7 [PH] (ref 5–8)
PLATELET # BLD AUTO: 255 K/UL (ref 150–400)
PMV BLD AUTO: 9.8 FL (ref 8.9–12.9)
POTASSIUM SERPL-SCNC: 3.7 MMOL/L (ref 3.5–5.1)
PROT SERPL-MCNC: 7.1 G/DL (ref 6.4–8.2)
PROT UR STRIP-MCNC: NEGATIVE MG/DL
RBC # BLD AUTO: 4.52 M/UL (ref 3.8–5.2)
RBC #/AREA URNS HPF: NORMAL /HPF (ref 0–5)
RBC MORPH BLD: NORMAL
SODIUM SERPL-SCNC: 142 MMOL/L (ref 136–145)
SP GR UR REFRACTOMETRY: 1.01 (ref 1–1.03)
TROPONIN-HIGH SENSITIVITY: 25 NG/L (ref 0–51)
UR CULT HOLD, URHOLD: NORMAL
UROBILINOGEN UR QL STRIP.AUTO: 0.2 EU/DL (ref 0.2–1)
WBC # BLD AUTO: 7.5 K/UL (ref 3.6–11)
WBC URNS QL MICRO: NORMAL /HPF (ref 0–4)

## 2022-05-20 PROCEDURE — 36415 COLL VENOUS BLD VENIPUNCTURE: CPT

## 2022-05-20 PROCEDURE — 85025 COMPLETE CBC W/AUTO DIFF WBC: CPT

## 2022-05-20 PROCEDURE — 81001 URINALYSIS AUTO W/SCOPE: CPT

## 2022-05-20 PROCEDURE — 84484 ASSAY OF TROPONIN QUANT: CPT

## 2022-05-20 PROCEDURE — 83735 ASSAY OF MAGNESIUM: CPT

## 2022-05-20 PROCEDURE — 83690 ASSAY OF LIPASE: CPT

## 2022-05-20 PROCEDURE — 80053 COMPREHEN METABOLIC PANEL: CPT

## 2022-05-20 PROCEDURE — 99284 EMERGENCY DEPT VISIT MOD MDM: CPT

## 2022-05-20 PROCEDURE — 93005 ELECTROCARDIOGRAM TRACING: CPT

## 2022-05-20 NOTE — ED PROVIDER NOTES
73-year-old female with a history of COPD, depression presents with chief complaint of fatigue. Patient states that she has had ongoing symptoms for the last 6 months. She was recently started on an SSRI. She took 1 pill today and states that \"she just does not feel right\". She indicates that she has had some abdominal pain but does not describe it or rated on a scale. She denies diarrhea, constipation, bloody stools, fevers or urinary symptoms.            Past Medical History:   Diagnosis Date    Anxiety     Arthritis     Cataract     Chronic obstructive pulmonary disease (HCC)     Chronic pain of right groin     after multiple complications from bladder sling that became imbedded    Depression     GERD (gastroesophageal reflux disease)     HTN (hypertension)     Lobular carcinoma in situ of breast 2009    right breast bx LCIS    Lumbar stenosis with neurogenic claudication 10/15/2021    Pneumonia     Spondylisthesis 10/15/2021    Thyroid disease     UTI (urinary tract infection)        Past Surgical History:   Procedure Laterality Date    HX ADENOIDECTOMY  1960    HX BLADDER SUSPENSION      Removed     HX BREAST BIOPSY Right 2009    LCIS    HX BREAST BIOPSY Left 2000's    punch bx benign had to be put under    HX CATARACT REMOVAL Bilateral     HX HYSTERECTOMY  2015    HX LYMPHADENECTOMY      HX OTHER SURGICAL  02/03/2022    plate inchest to stabalize fractured ribs    HX OTHER SURGICAL  11-    spinal infusion    HX TONSILLECTOMY  1960    HX UROLOGICAL  2014    MESH ,SLING PLACED     HX UROLOGICAL  2016    REMOVAL OF SLING     HX UROLOGICAL  2014    CYSTOCCELE/ RECTOCELE     KS BIOPSY OF BREAST, INCISIONAL Bilateral          Family History:   Problem Relation Age of Onset    Cancer Mother         BREAST     Breast Cancer Mother         dx age 48    Alcohol abuse Father     Breast Cancer Sister         dx age 48   [facility] Anesth Problems Neg Hx        Social History Socioeconomic History    Marital status:      Spouse name: Not on file    Number of children: Not on file    Years of education: Not on file    Highest education level: Not on file   Occupational History    Not on file   Tobacco Use    Smoking status: Current Every Day Smoker     Packs/day: 1.00     Years: 48.00     Pack years: 48.00     Types: Cigarettes    Smokeless tobacco: Never Used   Vaping Use    Vaping Use: Former    Substances: CBD, [address] [place] 2018    Devices: Pre-filled or refillable cartridge   Substance and Sexual Activity    Alcohol use: Yes     Comment: rarely    Drug use: Yes     Types: Marijuana, Prescription     Comment: nightly(pain relief)    Sexual activity: Not Currently   Other Topics Concern     Service No    Blood Transfusions No    Caffeine Concern No    Occupational Exposure No    Hobby Hazards No    Sleep Concern No    Stress Concern No    Weight Concern Yes    Special Diet No    Back Care Yes    Exercise Yes    Bike Helmet No    Seat Belt Yes    Self-Exams Yes   Social History Narrative    Not on file     Social Determinants of Health     Financial Resource Strain: Low Risk     Difficulty of Paying Living Expenses: Not hard at all   Food Insecurity: No Food Insecurity    Worried About Running Out of Food in the Last Year: Never true    Shama of Food in the Last Year: Never true   Transportation Needs: No Transportation Needs    Lack of Transportation (Medical): No    Lack of Transportation (Non-Medical):  No   Physical Activity:     Days of Exercise per Week: Not on file    Minutes of Exercise per Session: Not on file   Stress:     Feeling of Stress : Not on file   Social Connections:     Frequency of Communication with Friends and Family: Not on file    Frequency of Social Gatherings with Friends and Family: Not on file    Attends Orthodox Services: Not on file    Active Member of Clubs or Organizations: Not on file   [facility] Attends Club or Organization Meetings: Not on file    Marital Status: Not on file   Intimate Partner Violence:     Fear of Current or Ex-Partner: Not on file    Emotionally Abused: Not on file    Physically Abused: Not on file    Sexually Abused: Not on file   Housing Stability:     Unable to Pay for Housing in the Last Year: Not on file    Number of [place] in the Last Year: Not on file    Unstable Housing in the Last Year: Not on file         ALLERGIES: Morphine, Nitrofurantoin, Sulfa (sulfonamide antibiotics), and Macrobid [nitrofurantoin monohyd/m-cryst]    Review of Systems   Constitutional: Positive for fatigue. Negative for fever. HENT: Negative for rhinorrhea. Respiratory: Negative for shortness of breath. Cardiovascular: Negative for chest pain. Gastrointestinal: Positive for abdominal pain. Genitourinary: Negative for dysuria. Musculoskeletal: Negative for back pain. Skin: Negative for wound. Neurological: Negative for headaches. Psychiatric/Behavioral: Negative for confusion. Vitals:    05/20/22 1355   BP: (!) 147/87   Pulse: 72   Resp: 18   Temp: 98.5 °F (36.9 °C)   SpO2: 97%   Weight: 72.6 kg (160 lb)   Height: 5' 6\" (1.676 m)            Physical Exam  Vitals and nursing note reviewed. Constitutional:       General: She is not in acute distress. Appearance: Normal appearance. She is not ill-appearing, toxic-appearing or diaphoretic. HENT:      Head: Normocephalic and atraumatic. Eyes:      Extraocular Movements: Extraocular movements intact. Cardiovascular:      Rate and Rhythm: Normal rate. Pulses: Normal pulses. Pulmonary:      Effort: Pulmonary effort is normal. No respiratory distress. Abdominal:      General: There is no distension. Tenderness: There is no abdominal tenderness. Musculoskeletal:         General: Normal range of motion. Cervical back: Normal range of motion. Skin:     General: Skin is dry.    Neurological: Mental Status: She is alert and oriented to person, place, and time. Psychiatric:         Mood and Affect: Mood normal.          MDM  Number of Diagnoses or Management Options  Abdominal pain, unspecified abdominal location  Fatigue, unspecified type  Diagnosis management comments:   Patient presents with nonspecific symptoms of fatigue and abdominal pain. Labs including white blood cell count, renal and liver function are normal.  UA shows no evidence of infection. Patient advised to follow-up with family medicine. Discussed my clinical impression(s), any labs and/or radiology results with the patient. I answered any questions and addressed any concerns. Discussed the importance of following up with their primary care physician and/or specialist(s). Discussed signs or symptoms that would warrant return back to the ER for further evaluation. The patient is agreeable with discharge. EKG shows sinus rhythm at a rate of 61, normal intervals, normal axis, no ischemic changes.        Amount and/or Complexity of Data Reviewed  Clinical lab tests: ordered and reviewed           Procedures

## 2022-05-20 NOTE — ED TRIAGE NOTES
Pt arrived with c/o generalized body aches and issues. States she has not been feeling \"right\" since last June. Has had no pain or N,V,D. Cannot pinpoint her exact complaint but feels that something is \"not right\".  Pt ambulated into ED independently

## 2022-05-22 LAB
ATRIAL RATE: 61 BPM
CALCULATED P AXIS, ECG09: 46 DEGREES
CALCULATED R AXIS, ECG10: -18 DEGREES
CALCULATED T AXIS, ECG11: 48 DEGREES
DIAGNOSIS, 93000: NORMAL
P-R INTERVAL, ECG05: 170 MS
Q-T INTERVAL, ECG07: 410 MS
QRS DURATION, ECG06: 78 MS
QTC CALCULATION (BEZET), ECG08: 412 MS
VENTRICULAR RATE, ECG03: 61 BPM

## 2022-05-25 ENCOUNTER — HOSPITAL ENCOUNTER (OUTPATIENT)
Dept: PHYSICAL THERAPY | Age: 67
Discharge: HOME | End: 2022-05-25
Payer: MEDICARE

## 2022-05-25 PROCEDURE — 97110 THERAPEUTIC EXERCISES: CPT

## 2022-05-25 NOTE — PROGRESS NOTES
PT DAILY TREATMENT NOTE - [facility]     Patient Name: Marybeth Kitchen  Date:2022  : 1955  [x]  Patient  Verified  Payor: Zurdo Delong / Plan: [place] & [place] / Product Type: Medicare /    In time:115  Out time:2  Total Treatment Time (min): 45  Total Timed Codes (min): 30  1:1 Treatment Time ( [address] only): 30   Visit #: 3 of 16    Treatment Area: Other low back pain [M54.59]    SUBJECTIVE  Pain Level (0-10 scale): 5-610  Any medication changes, allergies to medications, adverse drug reactions, diagnosis change, or new procedure performed?: [x] No    [] Yes (see summary sheet for update)  Subjective functional status/changes:   [] No changes reported  Pain in legs and lumbar region, 5-6/10.   Had a good day yesterday, and was very busy the past several days    OBJECTIVE    Modality rationale: decrease inflammation and decrease pain to improve the patients ability to perform functional mobility   Min Type Additional Details    [] Estim:  []Unatt       []IFC  []Premod                        []Other:  []w/ice   []w/heat  Position:  Location:    [] Estim: []Att    []TENS instruct  []NMES                    []Other:  []w/US   []w/ice   []w/heat  Position:  Location:    []  Traction: [] Cervical       []Lumbar                       [] Prone          []Supine                       []Intermittent   []Continuous Lbs:  [] before manual  [] after manual    []  Ultrasound: []Continuous   [] Pulsed                           []1MHz   []3MHz W/cm2:  Location:    []  Iontophoresis with dexamethasone         Location: [] Take home patch   [] In clinic   15 []  Ice     [x]  heat  []  Ice massage  []  Laser   []  Anodyne Position:supine  Location:lumbar    []  Laser with stim  []  Other:  Position:  Location:    []  Vasopneumatic Device Pressure:       [] lo [] med [] hi   Temperature: [] lo [] med [] hi   [x] Skin assessment post-treatment:  [x]intact []redness- no adverse reaction    []redness  adverse reaction: 30 min Therapeutic Exercise:  [x] See flow sheet :   Rationale: increase ROM and increase strength to improve the patients ability to perform functional activities      With   [] TE   [] TA   [] neuro   [] other: Patient Education: [x] Review HEP    [] Progressed/Changed HEP based on:   [] positioning   [] body mechanics   [] transfers   [] heat/ice application    [] other:           Pain Level (0-10 scale) post treatment: 3    ASSESSMENT/Changes in Function: flare up of pain today, diminished after therapy, patient will continue to benefit from skilled PT to increase mobility and decrease her pain. Patient will continue to benefit from skilled PT services to modify and progress therapeutic interventions and address functional mobility deficits to attain remaining goals. [x]  See Plan of Care  []  See progress note/recertification  []  See Discharge Summary         Progress towards goals / Updated goals:   Working towards goals    PLAN  [x]  Upgrade activities as tolerated     [x]  Continue plan of care  []  Update interventions per flow sheet       []  Discharge due to:_  []  Other:_      Christal Standard, PT 5/25/2022

## 2022-05-26 ENCOUNTER — TELEPHONE (OUTPATIENT)
Dept: FAMILY MEDICINE CLINIC | Age: 67
End: 2022-05-26

## 2022-05-26 NOTE — TELEPHONE ENCOUNTER
Patient not tolerating her Zoloft, due to SE was seen in ER see previous note. Denies depression at this time has an appointment next week with Joy Pathak to discuss alternatives.  KATEY

## 2022-05-26 NOTE — TELEPHONE ENCOUNTER
----- Message from Naif sent at 5/26/2022 12:32 PM EDT -----  Subject: Medication Problem    QUESTIONS  Name of Medication? sertraline (ZOLOFT) 50 mg tablet  Patient-reported dosage and instructions? 50 mg, to take once a day  What question or problem do you have with the medication? Patient states   she took her medication 5/19 afternoon and felt very sick through 5/20. She went to [address] [place] [address] ED 5/20. She has not taken more than that one   pill on 5/19. She was told to contact her PCP. Preferred Pharmacy? [id number] [place], [address] phone number (if available)? [phone]  Additional Information for Provider? Message was sent to PCP to schedule   ED follow up.  ---------------------------------------------------------------------------  --------------  [address]  What is the best way for the office to contact you? OK to leave message on   voicemail  Preferred Call Back Phone Number? [phone]  ---------------------------------------------------------------------------  --------------  SCRIPT ANSWERS  Relationship to Patient?  Self Pt complaining of not feeling well for the past few days.

## 2022-06-01 ENCOUNTER — HOSPITAL ENCOUNTER (OUTPATIENT)
Dept: PHYSICAL THERAPY | Age: 67
Discharge: HOME | End: 2022-06-01
Payer: MEDICARE

## 2022-06-01 PROCEDURE — 97110 THERAPEUTIC EXERCISES: CPT

## 2022-06-01 NOTE — PROGRESS NOTES
PT DAILY TREATMENT NOTE - [facility]     Patient Name: Chasidy Simeon  Date:2022  : 1955  [x]  Patient  Verified  Payor: Nancy Ill / Plan: [place] MEDICARE PART A & B / Product Type: Medicare /    In time:920  Out time:1010  Total Treatment Time (min): 50  Total Timed Codes (min): 35  1:1 Treatment Time ( [address] only): 35   Visit #: 4 of 16    Treatment Area: Other low back pain [M54.59]    SUBJECTIVE  Pain Level (0-10 scale): 2-3  Any medication changes, allergies to medications, adverse drug reactions, diagnosis change, or new procedure performed?: [x] No    [] Yes (see summary sheet for update)  Subjective functional status/changes:   [] No changes reported  Slept not well last night    OBJECTIVE              Modality rationale: decrease inflammation and decrease pain to improve the patients ability to perform functional mobility   Min Type Additional Details      []? Estim:  []? Unatt       []? IFC  []? Premod                        []?Other:  []?w/ice   []?w/heat  Position:  Location:      []? Estim: []? Att    []? TENS instruct  []? NMES                    []?Other:  []?w/US   []?w/ice   []?w/heat  Position:  Location:      []? Traction: []? Cervical       []? Lumbar                       []? Prone          []? Supine                       []?Intermittent   []? Continuous Lbs:  []? before manual  []? after manual      []? Ultrasound: []? Continuous   []? Pulsed                           []? 1MHz   []? 3MHz W/cm2:  Location:      []? Iontophoresis with dexamethasone         Location: []? Take home patch   []? In clinic    15 []? Ice     [x]? heat  []? Ice massage  []? Laser   []? Anodyne Position:supine  Location:lumbar      []? Laser with stim  []? Other:  Position:  Location:      []? Vasopneumatic Device Pressure:       []? lo []? med []? hi   Temperature: []? lo []? med []? hi    [x]? Skin assessment post-treatment:  [x]? intact []? redness- no adverse reaction    []? redness  adverse reaction:    35 min Therapeutic Exercise:  [x]? See flow sheet :   Rationale: increase ROM and increase strength to improve the patients ability to perform functional activities      With   [] TE   [] TA   [] neuro   [] other: Patient Education: [x] Review HEP    [] Progressed/Changed HEP based on:   [] positioning   [] body mechanics   [] transfers   [] heat/ice application    [] other:         Pain Level (0-10 scale) post treatment: 0-2/10    ASSESSMENT/Changes in Function: Patient continues to benefit from core, and gentle LE and UE strengthening, flexibility exercises, and balance exercises. Pain has decreased since the evaluation    Patient will continue to benefit from skilled PT services to modify and progress therapeutic interventions and address functional mobility deficits to attain remaining goals. [x]  See Plan of Care  []  See progress note/recertification  []  See Discharge Summary         Progress towards goals / Updated goals:    Short Term Goals: To be accomplished in 8 treatments:  1. Patient will be independent in a HEP to decrease her pain and increase her overall mobility. MET   2. Patient will report she is able to perform activities around her home without difficulty. 3.  Patient will report her worst pain with activities is a 2/10. Long Term Goals: To be accomplished in 16 treatments:  1. Patient will report she is able to return to all prior activities without difficulty. 2.  Patient will report she is able to return to all prior activities without pain.     PLAN  [x]  Upgrade activities as tolerated     [x]  Continue plan of care  [x]  Update interventions per flow sheet       []  Discharge due to:_  []  Other:_      Ladarius Ramos, PT 6/1/2022

## 2022-06-06 ENCOUNTER — HOSPITAL ENCOUNTER (OUTPATIENT)
Dept: PHYSICAL THERAPY | Age: 67
Discharge: HOME | End: 2022-06-06
Payer: MEDICARE

## 2022-06-06 PROCEDURE — 97110 THERAPEUTIC EXERCISES: CPT

## 2022-06-06 NOTE — PROGRESS NOTES
PT DAILY TREATMENT NOTE - [facility]     Patient Name: Suresh Kohli  Date:2022  : 1955  [x]  Patient  Verified  Payor: Sri Case / Plan: [place] MEDICARE PART A & B / Product Type: Medicare /    In time: 1010 Out time:1050  Total Treatment Time (min): 40  Total Timed Codes (min): 40  1:1 Treatment Time ([facility] only): 40  Visit #: 6 of 16    Treatment Area: Other low back pain [M54.59]    SUBJECTIVE  Pain Level (0-10 scale): 3/10  Any medication changes, allergies to medications, adverse drug reactions, diagnosis change, or new procedure performed?: [x] No    [] Yes (see summary sheet for update)  Subjective functional status/changes:   [x] No changes reported      OBJECTIVE    Modality rationale: decrease inflammation, decrease pain and increase tissue extensibility to improve the patients ability to perform functional mobility tasks independently and safely. Min Type Additional Details   10 []  Ice     [x]  heat  []  Ice massage  []  Laser   []  Anodyne Position: Supine  Location: Lumbar region hot pack   [x] Skin assessment post-treatment:  [x]intact [x]redness- no adverse reaction    []redness - adverse reaction:       30 min Therapeutic Exercise:  [x] See flow sheet :   Rationale: increase ROM, increase strength and improve coordination to improve the patients ability to perform mobility and community level tasks independently and safely. With   [x] TE   [] TA   [] neuro   [] other: Patient Education: [x] Review HEP    [x] Progressed/Changed HEP based on:   [] positioning   [] body mechanics   [] transfers   [] heat/ice application    [] other:      Pain Level (0-10 scale) post treatment: 3/10    ASSESSMENT/Changes in Function: Pt reports good adherence to HEP, decreased pain this visit. Progressed UE/ back strengthening exercises to include lat pull downs and progressed color of band from yellow to red for previous exercises.  Noted improved posture and balance with cues for core activation with activities. She continues to benefit from OP PT services, continue per POC. Patient will continue to benefit from skilled PT services to modify and progress therapeutic interventions, address functional mobility deficits, address ROM deficits, address strength deficits, analyze and cue movement patterns, assess and modify postural abnormalities and instruct in home and community integration to attain remaining goals.      [x]  See Plan of Care  []  See progress note/recertification  []  See Discharge Summary    PLAN  [x]  Upgrade activities as tolerated     [x]  Continue plan of care  [x]  Update interventions per flow sheet           Lawrence Lanier, PT, DPT, NCS 6/6/2022

## 2022-06-07 ENCOUNTER — TELEPHONE (OUTPATIENT)
Dept: ORTHOPEDIC SURGERY | Age: 67
End: 2022-06-07

## 2022-06-07 NOTE — TELEPHONE ENCOUNTER
Prior to changing to Lyrica, she was on Gabapentin three times daily but was not seeing any relief from her pain. Does she want to go back on this dose or does she want me to increase the dose as well?     Constanza iVck

## 2022-06-08 ENCOUNTER — HOSPITAL ENCOUNTER (OUTPATIENT)
Dept: PHYSICAL THERAPY | Age: 67
Discharge: HOME | End: 2022-06-08
Payer: MEDICARE

## 2022-06-08 PROCEDURE — 97110 THERAPEUTIC EXERCISES: CPT

## 2022-06-08 NOTE — PROGRESS NOTES
PT DAILY TREATMENT NOTE - [facility]     Patient Name: Karan Dupree  Date:2022  : 1955  [x]  Patient  Verified  Payor: [place] MEDICARE / Plan: [place] MEDICARE PART A & B / Product Type: Medicare /    In time:1130  Out time:1215  Total Treatment Time (min): 45  Total Timed Codes (min): 45  1:1 Treatment Time ([address] only): 45   Visit #: 7  16    Treatment Area: Other low back pain [M54.59]    SUBJECTIVE  Pain Level (0-10 scale): 2/10  Any medication changes, allergies to medications, adverse drug reactions, diagnosis change, or new procedure performed?: [x] No    [] Yes (see summary sheet for update)  Subjective functional status/changes:   [x] No changes reported  \"I am feeling so much better. The heat and exercises really help me. \"    OBJECTIVE    Modality rationale: decrease edema, decrease inflammation, decrease pain and increase tissue extensibility to improve the patients ability to perform gait and community level mobility tasks independently and safely. Min Type Additional Details   15 []  Ice     [x]  heat  []  Ice massage  []  Laser   []  Anodyne Position: Supine  Location: Lumbar spine   [x] Skin assessment post-treatment:  [x]intact [x]redness- no adverse reaction    []redness - adverse reaction:     30 min Therapeutic Exercise:  [x] See flow sheet :   Rationale: increase ROM, increase strength, improve coordination and improve balance to improve the patients ability to perform community level mobility pain free and safely without risk for falls. With   [x] TE   [] TA   [] neuro   [] other: Patient Education: [x] Review HEP    [x] Progressed/Changed HEP based on:   [] positioning   [] body mechanics   [] transfers   [] heat/ice application    [] other:        Pain Level (0-10 scale) post treatment: 010    ASSESSMENT/Changes in Function: Miquel Bahena ambulated into clinic independently without device, no major LOB noted.  She reports decreased pain today compared to last visit (2/10) and good adherence to HEP as instructed this week. She tolerated increase in difficulty of scapular and back strengthening exercises with good result. Progressed difficulty of HEP to include green bands up from red resistance bands last visit. Pt continues to be highly motivated and is a good therapy clinic. Final pain level was 0/10 today, continue per POC. Patient will continue to benefit from skilled PT services to modify and progress therapeutic interventions, address functional mobility deficits, address ROM deficits, address strength deficits, analyze and address soft tissue restrictions, analyze and cue movement patterns, analyze and modify body mechanics/ergonomics, assess and modify postural abnormalities, address imbalance/dizziness and instruct in home and community integration to attain remaining goals.      [x]  See Plan of Care  [x]  See progress note/recertification  []  See Discharge Summary      PLAN  [x]  Upgrade activities as tolerated     [x]  Continue plan of care  [x]  Update interventions per flow sheet       []  Discharge due to:_  []  Other:_      Graciela Park, PT, DPT, NCS 6/8/2022

## 2022-06-13 ENCOUNTER — HOSPITAL ENCOUNTER (OUTPATIENT)
Dept: PHYSICAL THERAPY | Age: 67
Discharge: HOME | End: 2022-06-13
Payer: MEDICARE

## 2022-06-13 DIAGNOSIS — F41.8 DEPRESSION WITH ANXIETY: ICD-10-CM

## 2022-06-13 PROCEDURE — 97110 THERAPEUTIC EXERCISES: CPT

## 2022-06-13 NOTE — PROGRESS NOTES
PT DAILY TREATMENT NOTE - [facility]     Patient Name: Batool Marroquin  Date:2022  : 1955  [x]  Patient  Verified  Payor: Tarah Clineer / Plan: [place] / Product Type: Medicare /    In time:1035  Out time:1140  Total Treatment Time (min): 45  Total Timed Codes (min): 45  1:1 Treatment Time ([address] only): 35  Visit #: 7  16    Treatment Area: Other low back pain [M54.59]    SUBJECTIVE  Pain Level (0-10 scale): 1-2/10  Any medication changes, allergies to medications, adverse drug reactions, diagnosis change, or new procedure performed?: [x] No    [] Yes (see summary sheet for update)  Subjective functional status/changes:   [x] No changes reported  \"I got a good workout in today with that (therapy exercises). \"    OBJECTIVE    Modality rationale: decrease edema, decrease inflammation, decrease pain and increase tissue extensibility to improve the patients ability to perform community level gait and mobility safely. Min Type Additional Details   10 -  Ice     x  heat  -  Ice massage  -  Laser   -  Anodyne Position:supine  Location:lumbar   [x] Skin assessment post-treatment:  []intact [x]redness- no adverse reaction    []redness - adverse reaction:     35 min Therapeutic Exercise:  [x] See flow sheet :   Rationale: increase ROM, increase strength and improve coordination to improve the patients ability to perform community level mobility safely and pain free. With   [x] TE   [] TA   [] neuro   [] other: Patient Education: [x] Review HEP    [] Progressed/Changed HEP based on:   [] positioning   [] body mechanics   [] transfers   [] heat/ice application    [] other:        Pain Level (0-10 scale) post treatment: did not report    ASSESSMENT/Changes in Function: see progress note this date.     Patient will continue to benefit from skilled PT services to modify and progress therapeutic interventions, address functional mobility deficits, address ROM deficits, address strength deficits, analyze and address soft tissue restrictions, analyze and cue movement patterns, analyze and modify body mechanics/ergonomics and assess and modify postural abnormalities to attain remaining goals. []  See Plan of Care  []  See progress note/recertification  []  See Discharge Summary         Progress towards goals / Updated goals:  See progress note this date.     PLAN  [x]  Upgrade activities as tolerated     [x]  Continue plan of care  [x]  Update interventions per flow sheet       []  Discharge due to:_  []  Other:_      Julia Perez, PT, DPT, NCS 6/13/2022

## 2022-06-13 NOTE — PROGRESS NOTES
[facility]   [address], [address] [place] [address] Road  Phone: [phone]      Fax:  [phone]    Progress Note    Name: Zana Abraham   : 1955   MD: Joaquina Howard MD       Treatment Diagnosis: Other low back pain [M54.59]  Start of Care: 2022    Visits from Start of Care: 7  Missed Visits: 0    Summary of Care: Quiana Chester continues to make slow steady progress towards mobility goals this plan of care. She continues to report decreased pain pre/post visits and is tolerating increased difficulty of strengthening exercises. She reports good tolerance of HEP and adherence to program outside clinic sessions. She continues to be highly motivated to improve her mobility status and is a good PT candidate. Recommend continue with therapy sessions 1-2x/wk for an additional 8 visits or until mobility goals are met. Assessment / Recommendations: extend visits 1-2x/wk for an additional 8 visits or until mobility goals are met. Progress towards goals since evaluation:    Short Term Goals: To be accomplished in 8 treatments:  1. Patient will be independent in a HEP to decrease her pain and increase her overall mobility. MET. Continue as HEP progresses  2. Patient will report she is able to perform activities around her home without difficulty. Progressing, continue  3. Patient will report her worst pain with activities is a 2/10. Progressing, continue    Long Term Goals: To be accomplished in 16 treatments:  1. Patient will report she is able to return to all prior activities without difficulty. Progressing, continue  2. Patient will report she is able to return to all prior activities without pain. Progressing, continue  3. Patient will report she has less numbness and tingling in her LEs at rest and with activities. Progressing, continue  Frequency / Duration: Patient to be seen 1-2 times per week for 16 treatments.  Progressing, continue    Adam Wang PT, DPT, NCS 6/13/2022 11:01 AM    ________________________________________________________________________  NOTE TO PHYSICIAN:  Please complete the following and fax to: [facility]: Fax: [phone]. Rometta Favre Retain this original for your records. If you are unable to process this request in 24 hours, please contact our office.        ____ I have read the above report and request that my patient continue therapy with the following changes/special instructions:  ____ I have read the above report and request that my patient be discharged from therapy    Physician's Signature:_________________ Date:___________Time:__________

## 2022-06-15 RX ORDER — SERTRALINE HYDROCHLORIDE 50 MG/1
50 TABLET, FILM COATED ORAL DAILY
Qty: 30 TABLET | Refills: 0 | Status: SHIPPED | OUTPATIENT
Start: 2022-06-15 | End: 2022-06-29 | Stop reason: SDUPTHER

## 2022-06-16 ENCOUNTER — HOSPITAL ENCOUNTER (OUTPATIENT)
Dept: PHYSICAL THERAPY | Age: 67
Discharge: HOME | End: 2022-06-16
Payer: MEDICARE

## 2022-06-16 PROCEDURE — 97110 THERAPEUTIC EXERCISES: CPT

## 2022-06-16 NOTE — PROGRESS NOTES
PT DAILY TREATMENT NOTE - [facility]     Patient Name: Jd Fee  Date:2022  : 1955  [x]  Patient  Verified  Payor: Nilda Watson / Plan: [place] MEDICARE PART A & B / Product Type: Medicare /    In time:1055  Out time:1150  Total Treatment Time (min): 55  Total Timed Codes (min): 45  1:1 Treatment Time ( [address] only): 45   Visit #: 8 of 16    Treatment Area: Other low back pain [M54.59]    SUBJECTIVE  Pain Level (0-10 scale): 210  Any medication changes, allergies to medications, adverse drug reactions, diagnosis change, or new procedure performed?: [x] No    [] Yes (see summary sheet for update)  Subjective functional status/changes:   [x] No changes reported  \"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\"    OBJECTIVE    Modality rationale: decrease edema, decrease inflammation, decrease pain, increase tissue extensibility and increase muscle contraction/control to improve the patients ability to perform daily mobility tasks safely and independently   Min Type Additional Details   15 []  Ice     [x]  heat  []  Ice massage  []  Laser   []  Anodyne Position: supine  Location: lumbar   [x] Skin assessment post-treatment:  [x]intact []redness- no adverse reaction    []redness - adverse reaction:     40 min Therapeutic Exercise:  [x] See flow sheet :   Rationale: increase ROM, increase strength and improve coordination to improve the patients ability to perform community level mobility tasks safely, independently, and pain free. With   [x] TE   [] TA   [] neuro   [] other: Patient Education: [x] Review HEP    [] Progressed/Changed HEP based on:   [] positioning   [] body mechanics   [] transfers   [] heat/ice application    [] other:        Pain Level (0-10 scale) post treatment: 0-1/10    ASSESSMENT/Changes in Function: Colleen Zaidi ambulated into clinic independently without device, no LOB noted.  She reports good adherence to HEP with generally decreased back pain this POC. Pt tolerated progression of strengthening exercises with good effect, increased resistance on standing back strengthening exercises to blue theraband this date. Pt continues to benefit from PT services. Recommend continue per POC. Patient will continue to benefit from skilled PT services to modify and progress therapeutic interventions, address functional mobility deficits, address ROM deficits, address strength deficits, analyze and address soft tissue restrictions, analyze and cue movement patterns, analyze and modify body mechanics/ergonomics, assess and modify postural abnormalities and instruct in home and community integration to attain remaining goals.      [x]  See Plan of Care  [x]  See progress note/recertification  []  See Discharge Summary           PLAN  [x]  Upgrade activities as tolerated     [x]  Continue plan of care  [x]  Update interventions per flow sheet       []  Discharge due to:_  []  Other:_      Angy Davidson, PT, DPT, NCS 6/16/2022

## 2022-06-23 ENCOUNTER — HOSPITAL ENCOUNTER (OUTPATIENT)
Dept: PHYSICAL THERAPY | Age: 67
Discharge: HOME | End: 2022-06-23
Payer: MEDICARE

## 2022-06-23 PROCEDURE — 97110 THERAPEUTIC EXERCISES: CPT

## 2022-06-23 NOTE — PROGRESS NOTES
PT DAILY TREATMENT NOTE - [facility]     Patient Name: Ant Weeks  Date:2022  : 1955  [x]  Patient  Verified  Payor: Jannet Baig / Plan: [place] MEDICARE PART A & B / Product Type: Medicare /    In time:1050  Out time:1145  Total Treatment Time (min): 55  Total Timed Codes (min): 40  1:1 Treatment Time ( [address] only): 40   Visit #: 9 of 16    Treatment Area: Other low back pain [M54.59]    SUBJECTIVE  Pain Level (0-10 scale): 2  Any medication changes, allergies to medications, adverse drug reactions, diagnosis change, or new procedure performed?: [x] No    [] Yes (see summary sheet for update)  Subjective functional status/changes:   [] No changes reported  Patient reports she is doing more around the home however she fatigues quickly    OBJECTIVE                   Modality rationale: decrease inflammation and decrease pain to improve the patients ability to perform functional mobility   Min Type Additional Details       []? ? Estim:  []?? Unatt       []? ?IFC  []? ?Premod                        []? ? Other:  []??w/ice   []? ?w/heat  Position:  Location:       []? ? Estim: []??Att    []? ?TENS instruct  []? ?NMES                    []? ? Other:  []??w/US   []? ?w/ice   []? ?w/heat  Position:  Location:       []? ?  Traction: []?? Cervical       []? ?Lumbar                       []? ? Prone          []? ?Supine                       []? ?Intermittent   []? ? Continuous Lbs:  []?? before manual  []? ? after manual       []? ?  Ultrasound: []??Continuous   []? ? Pulsed                           []? ?1MHz   []? ? 3MHz W/cm2:  Location:       []? ?  Iontophoresis with dexamethasone         Location: []?? Take home patch   []? ? In clinic     15 []? ?  Ice     [x]? ?  heat  []? ?  Ice massage  []? ?  Laser   []? ?  Anodyne Position:supine  Location:lumbar       []? ?  Laser with stim  []? ?  Other:  Position:  Location:       []? ?  Vasopneumatic Device Pressure:       []? ? lo []? ? med []?? hi   Temperature: []?? lo []? ? med []?? hi   [x]?? Skin assessment post-treatment:  [x]? ? intact []? ?redness- no adverse reaction    []? ?redness - adverse reaction:      40 min Therapeutic Exercise:  [x]? ? See flow sheet :   Rationale: increase ROM and increase strength to improve the patients ability to perform functional activities                 With   [] TE   [] TA   [] neuro   [] other: Patient Education: [x] Review HEP    [] Progressed/Changed HEP based on:   [] positioning   [] body mechanics   [] transfers   [] heat/ice application    [] other:         Pain Level (0-10 scale) post treatment:1- 2    ASSESSMENT/Changes in Function: Patient responding well to gentle trunk strengthening, decreased pain    Patient will continue to benefit from skilled PT services to modify and progress therapeutic interventions and address functional mobility deficits to attain remaining goals. [x]  See Plan of Care  []  See progress note/recertification  []  See Discharge Summary         Progress towards goals / Updated goals:    Short Term Goals: To be accomplished in 8 treatments:  1.  Patient will be independent in a HEP to decrease her pain and increase her overall mobility.  MET. Continue as HEP progresses  2.  Patient will report she is able to perform activities around her home without difficulty. Progressing, continue  3.  Patient will report her worst pain with activities is a 2/10. Progressing, continue     Long Term Goals: To be accomplished in 16 treatments:  1.  Patient will report she is able to return to all prior activities without difficulty. Progressing, continue  2.  Patient will report she is able to return to all prior activities without pain. Progressing, continue  3.  Patient will report she has less numbness and tingling in her LEs at rest and with activities.  Progressing, continue    PLAN  [x]  Upgrade activities as tolerated     [x]  Continue plan of care  [x]  Update interventions per flow sheet       []  Discharge due to:_  []  Other:_ Ofilia Standard, PT 6/23/2022

## 2022-06-29 DIAGNOSIS — F41.8 DEPRESSION WITH ANXIETY: ICD-10-CM

## 2022-06-29 RX ORDER — SERTRALINE HYDROCHLORIDE 50 MG/1
50 TABLET, FILM COATED ORAL DAILY
Qty: 30 TABLET | Refills: 0 | Status: ON HOLD | OUTPATIENT
Start: 2022-06-29 | End: 2022-10-20

## 2022-07-05 ENCOUNTER — APPOINTMENT (OUTPATIENT)
Dept: PHYSICAL THERAPY | Age: 67
End: 2022-07-05

## 2022-07-05 DIAGNOSIS — F41.8 DEPRESSION WITH ANXIETY: ICD-10-CM

## 2022-07-05 RX ORDER — SERTRALINE HYDROCHLORIDE 50 MG/1
50 TABLET, FILM COATED ORAL DAILY
Qty: 30 TABLET | Refills: 0 | OUTPATIENT
Start: 2022-07-05

## 2022-07-07 ENCOUNTER — APPOINTMENT (OUTPATIENT)
Dept: PHYSICAL THERAPY | Age: 67
End: 2022-07-07

## 2022-07-12 ENCOUNTER — HOSPITAL ENCOUNTER (OUTPATIENT)
Dept: PHYSICAL THERAPY | Age: 67
Discharge: HOME | End: 2022-07-12

## 2022-07-12 NOTE — PROGRESS NOTES
[facility]   [address], [address] [place] [address] Road  Phone: [phone]      Fax:  [phone]    Progress Note    Name: Micky Marshall   : 1955   MD: Tati Flaherty MD       Treatment Diagnosis: Other low back pain [M54.59]  Start of Care: 22    Visits from Start of Care: 9      Summary of Care: Rico Dupree continues to make slow steady progress towards mobility goals this plan of care. She continues to report decreased pain pre/post visits and is tolerating increased difficulty of strengthening exercises. She reports good tolerance of HEP and adherence to program outside clinic sessions. Her pain at the last session was a 2/10 and she reported being able to perform more activities around the home. She continues to be highly motivated to improve her mobility status and is a good PT candidate. Recommend continue with therapy sessions 1-2x/wk for an additional 7 visits or until mobility goals are met. Also Recommend patient participate in the maintenance exercise program after D/C and patient is in agreement with this. Assessment / Recommendations: Continue skilled PT 1-2 times/wk up to 16 visits to work towards mobility goals. Short Term Goals: To be accomplished in 8 treatments:  1.  Patient will be independent in a HEP to decrease her pain and increase her overall mobility.  MET.  Continue as HEP progresses  2.  Patient will report she is able to perform activities around her home without difficulty. Progressing, continue  3.  Patient will report her worst pain with activities is a 2/10. Progressing, continue  [address] be accomplished in 16 treatments:  1.  Patient will report she is able to return to all prior activities without difficulty. Progressing, continue  2.  Patient will report she is able to return to all prior activities without pain. Progressing, continue  3.  Patient will report she has less numbness and tingling in her LEs at rest and with activities. Progressing, continue        Dot Anibal, PT 7/12/2022 3:20 PM    ________________________________________________________________________  NOTE TO PHYSICIAN:  Please complete the following and fax to: [facility]: Fax: [phone]. [facility] Retain this original for your records. If you are unable to process this request in 24 hours, please contact our office.        ____ I have read the above report and request that my patient continue therapy with the following changes/special instructions:  ____ I have read the above report and request that my patient be discharged from therapy    Physician's Signature:_________________ Date:___________Time:__________

## 2022-07-26 ENCOUNTER — APPOINTMENT (OUTPATIENT)
Dept: PHYSICAL THERAPY | Age: 67
End: 2022-07-26

## 2022-07-28 ENCOUNTER — APPOINTMENT (OUTPATIENT)
Dept: PHYSICAL THERAPY | Age: 67
End: 2022-07-28

## 2022-08-03 ENCOUNTER — OFFICE VISIT (OUTPATIENT)
Dept: SURGERY | Age: 67
End: 2022-08-03
Payer: MEDICARE

## 2022-08-03 VITALS
SYSTOLIC BLOOD PRESSURE: 138 MMHG | DIASTOLIC BLOOD PRESSURE: 80 MMHG | BODY MASS INDEX: 25.11 KG/M2 | HEIGHT: 67 IN | TEMPERATURE: 97.1 F | HEART RATE: 64 BPM | WEIGHT: 160 LBS

## 2022-08-03 DIAGNOSIS — K57.90 DIVERTICULOSIS: ICD-10-CM

## 2022-08-03 DIAGNOSIS — Z86.010 HX OF COLONIC POLYPS: ICD-10-CM

## 2022-08-03 DIAGNOSIS — R19.8 RECTAL FULLNESS: ICD-10-CM

## 2022-08-03 DIAGNOSIS — K21.9 GASTROESOPHAGEAL REFLUX DISEASE WITHOUT ESOPHAGITIS: Primary | ICD-10-CM

## 2022-08-03 PROCEDURE — G8427 DOCREV CUR MEDS BY ELIG CLIN: HCPCS | Performed by: SURGERY

## 2022-08-03 PROCEDURE — G9899 SCRN MAM PERF RSLTS DOC: HCPCS | Performed by: SURGERY

## 2022-08-03 PROCEDURE — 1123F ACP DISCUSS/DSCN MKR DOCD: CPT | Performed by: SURGERY

## 2022-08-03 PROCEDURE — 1090F PRES/ABSN URINE INCON ASSESS: CPT | Performed by: SURGERY

## 2022-08-03 PROCEDURE — G9717 DOC PT DX DEP/BP F/U NT REQ: HCPCS | Performed by: SURGERY

## 2022-08-03 PROCEDURE — 99204 OFFICE O/P NEW MOD 45 MIN: CPT | Performed by: SURGERY

## 2022-08-03 PROCEDURE — 1101F PT FALLS ASSESS-DOCD LE1/YR: CPT | Performed by: SURGERY

## 2022-08-03 PROCEDURE — G8399 PT W/DXA RESULTS DOCUMENT: HCPCS | Performed by: SURGERY

## 2022-08-03 PROCEDURE — G8536 NO DOC ELDER MAL SCRN: HCPCS | Performed by: SURGERY

## 2022-08-03 PROCEDURE — G8419 CALC BMI OUT NRM PARAM NOF/U: HCPCS | Performed by: SURGERY

## 2022-08-03 PROCEDURE — 3017F COLORECTAL CA SCREEN DOC REV: CPT | Performed by: SURGERY

## 2022-08-03 NOTE — PATIENT INSTRUCTIONS
Upper GI Endoscopy: Before Your Procedure  What is an upper GI endoscopy? An upper gastrointestinal (or GI) endoscopy is a test that allows your doctor to look at the inside of your esophagus, stomach, and the first part of your small intestine, called the duodenum. The esophagus is the tube that carries food to your stomach. The doctor uses a thin, lighted tube that bends. It is called an endoscope, or scope. The doctor puts the tip of the scope in your mouth and gently moves it down your throat. The scope is a flexible video camera. The doctor looks at a monitor (like a TV set or a computer screen) as he or she moves the scope. A doctor may do this procedure to look for ulcers, tumors, infection, or bleeding. It also can be used to look for signs of acid backing up into your esophagus. This is called gastroesophageal reflux disease, or GERD. The doctor can use the scope to take a sample of tissue for study (a biopsy). The doctor also can use the scope to take out growths or stop bleeding. Follow-up care is a key part of your treatment and safety. Be sure to make and go to all appointments, and call your doctor if you are having problems. It's also a good idea to know your test results and keep a list of the medicines you take. How do you prepare for the procedure? Procedures can be stressful. This information will help you understand what you can expect. And it will help you safely prepare for your procedure. Preparing for the procedure    Do not eat or drink anything for 6 to 8 hours before the test. An empty stomach helps your doctor see your stomach clearly during the test. It also reduces your chances of vomiting. If you vomit, there is a small risk that the vomit could enter your lungs. (This is called aspiration.) If the test is done in an emergency, a tube may be inserted through your nose or mouth to empty your stomach.      Do not take sucralfate (Carafate) or antacids on the day of the test. These medicines can make it hard for your doctor to see your upper GI tract. If your doctor tells you to, stop taking iron supplements 7 to 14 days before the test.     Be sure you have someone to take you home. Anesthesia and pain medicine will make it unsafe for you to drive or get home on your own. Understand exactly what procedure is planned, along with the risks, benefits, and other options. Tell your doctor ALL the medicines, vitamins, supplements, and herbal remedies you take. Some may increase the risk of problems during your procedure. Your doctor will tell you if you should stop taking any of them before the procedure and how soon to do it. If you take aspirin or some other blood thinner, ask your doctor if you should stop taking it before your procedure. Make sure that you understand exactly what your doctor wants you to do. These medicines increase the risk of bleeding. Make sure your doctor and the hospital have a copy of your advance directive. If you don't have one, you may want to prepare one. It lets others know your health care wishes. It's a good thing to have before any type of surgery or procedure. What happens on the day of the procedure? Follow the instructions exactly about when to stop eating and drinking. If you don't, your procedure may be canceled. If your doctor told you to take your medicines on the day of the procedure, take them with only a sip of water. Take a bath or shower before you come in for your procedure. Do not apply lotions, perfumes, deodorants, or nail polish. Take off all jewelry and piercings. And take out contact lenses, if you wear them. At the hospital or surgery center   Bring a picture ID. The test may take 15 to 30 minutes. The doctor may spray medicine on the back of your throat to numb it. You also will get medicine to prevent pain and to relax you. You will lie on your left side.  The doctor will put the scope in your mouth and toward the back of your throat. The doctor will tell you when to swallow. This helps the scope move down your throat. You will be able to breathe normally. The doctor will move the scope down your esophagus into your stomach. The doctor also may look at the duodenum. If your doctor wants to take a sample of tissue for a biopsy, he or she may use small surgical tools, which are put into the scope, to cut off some tissue. You will not feel a biopsy, if one is taken. The doctor also can use the tools to stop bleeding or to do other treatments, if needed. You will stay at the hospital or surgery center for 1 to 2 hours until the medicine you were given wears off. What happens after an upper GI endoscopy? After the test, you may belch and feel bloated for a while. You may have a tickling, dry throat or mouth. You may feel a bit hoarse, and you may have a mild sore throat. These symptoms may last several days. Throat lozenges and warm saltwater gargles can help relieve the throat symptoms. Ask your doctor when you can drive again. Your doctor will tell you when you can go back to your usual diet and activities. Don't drink alcohol for 12 to 24 hours after the test.   When should you call your doctor? You have questions or concerns. You don't understand how to prepare for your procedure. You become ill before the procedure (such as fever, flu, or a cold). You need to reschedule or have changed your mind about having the procedure. Where can you learn more? Go to http://zain-roberto.info/  Enter P790 in the search box to learn more about \"Upper GI Endoscopy: Before Your Procedure. \"  Current as of: September 8, 2021               Content Version: 13.2  © 4269-0400 [employer]. Care instructions adapted under license by [employer] (which disclaims liability or warranty for this information).  If you have questions about a medical condition or this instruction, always ask your healthcare professional. [address] any warranty or liability for your use of this information. Learning About Colonoscopy  What is a colonoscopy? A colonoscopy is a test (also called a procedure) that lets a doctor look inside your large intestine. The doctor uses a thin, lighted tube called a colonoscope. The doctor uses it to look for small growths called polyps, colon or rectal cancer (colorectal cancer), or other problems like bleeding. During the procedure, the doctor can take samples of tissue. The samples can then be checked for cancer or other conditions. The doctor can also take out polyps. How is a colonoscopy done? This procedure is done in a doctor's office or a clinic or hospital. You will get medicine to help you relax and not feel pain. Some people find that they don't remember having the test because of the medicine. The doctor gently moves the colonoscope, or scope, through the colon. The scope is also a small video camera. It lets the doctor see the colon and take pictures. How do you prepare for the procedure? You need to clean out your colon before the procedure so the doctor can see your colon. This depends on which \"colon prep\" your doctor recommends. To clean out your colon, you'll do a \"colon prep\" before the test. This means you stop eating solid foods and drink only clear liquids. You can have water, tea, coffee, clear juices, clear broths, flavored ice pops, and gelatin (such as Jell-O). Do not drink anything red or purple. The day or night before the procedure, you drink a large amount of a special liquid. This causes loose, frequent stools. You will go to the bathroom a lot. Your doctor may have you drink part of the liquid the evening before and the rest on the day of the test. It's very important to drink all of the liquid. If you have problems drinking it, call your doctor.   Arrange to have someone take you home after the test.  What can you expect after a colonoscopy? Your doctor will tell you when you can eat and do your usual activities. Drink a lot of fluid after the test to replace the fluids you may have lost during the colon prep. But don't drink alcohol. Your doctor will talk to you about when you'll need your next colonoscopy. The results of your test and your risk for colorectal cancer will help your doctor decide how often you need to be checked. After the test, you may be bloated or have gas pains. You may need to pass gas. If a biopsy was done or a polyp was removed, you may have streaks of blood in your stool (feces) for a few days. Check with your doctor to see when it is safe to take aspirin and nonsteroidal anti-inflammatory drugs (NSAIDs) again. Problems such as heavy rectal bleeding may not occur until several weeks after the test. This isn't common. But it can happen after polyps are removed. Follow-up care is a key part of your treatment and safety. Be sure to make and go to all appointments, and call your doctor if you are having problems. It's also a good idea to know your test results and keep a list of the medicines you take. Where can you learn more? Go to http://www.gray.com/  Enter Z368 in the search box to learn more about \"Learning About Colonoscopy. \"  Current as of: September 8, 2021               Content Version: 13.2  © 0433-4284 [employer]. Care instructions adapted under license by [employer] (which disclaims liability or warranty for this information). If you have questions about a medical condition or this instruction, always ask your healthcare professional. [address] any warranty or liability for your use of this information.

## 2022-08-03 NOTE — PROGRESS NOTES
Qiana Kraus is a 69 y.o. female who presents today with the following:  Chief Complaint   Patient presents with    New Patient    Colon Cancer Screening     EGD       HPI    66-year-old female who presents as a referral from [facility] for possible upper and lower endoscopy. Her last colonoscopy apparently was in September 2018 in [place]. They found 2 hyperplastic polyps. There also is mention on prior colonoscopies of diverticulosis. She states in the last 2 years she has developed some rectal fullness. She denies any melena or hematochezia with this. She states that when she feels like she has a moderate to large amount of stool in her rectum she has considerable pain. She has soreness after a bowel movement with this. She has had multiple pelvic surgeries. In particular she has had a hysterectomy. She had a cystocele and rectocele repair. She had a bladder sling. This was subsequently removed and according to her had erosion into the pelvic floor musculature. She also has a history of reflux esophagitis. There is some mention in the notes of the possibility of her having Fairchild's in the remote past.  Her most recent EGD apparently was in 2016 and showed some esophagitis but no dysplasia. She was on a PPI that she stopped on her own 2 years ago because of reading about the side effects and has put herself on vinegar. She has breakthrough episodes of reflux about 3-4 times a month. She still is actively smoking about a pack to a pack and a half a day and has done so for 50 years. She also have 4-5 drinks a week. She does have a history of hypertension and hypothyroidism. Other than the aforementioned pelvic surgery she sustained a fall and had 5 rib fractures on the left side that apparently underwent open reduction and internal fixation. She is also had spinal fusion.   Past Medical History:   Diagnosis Date    Anxiety     Arthritis     Cataract     Chronic obstructive pulmonary disease (HCC)     Chronic pain of right groin     after multiple complications from bladder sling that became imbedded    Depression     GERD (gastroesophageal reflux disease)     HTN (hypertension)     Lobular carcinoma in situ of breast 2009    right breast bx LCIS    Lumbar stenosis with neurogenic claudication 10/15/2021    Pneumonia     Spondylisthesis 10/15/2021    Thyroid disease     UTI (urinary tract infection)        Past Surgical History:   Procedure Laterality Date    HX ADENOIDECTOMY  1960    HX BLADDER SUSPENSION      Removed     HX BREAST BIOPSY Right 2009    LCIS    HX BREAST BIOPSY Left 2000's    punch bx benign had to be put under    HX CATARACT REMOVAL Bilateral     HX HYSTERECTOMY  2015    HX LYMPHADENECTOMY      HX OTHER SURGICAL  02/03/2022    plate inchest to stabalize fractured ribs    HX OTHER SURGICAL  11-    spinal infusion    HX TONSILLECTOMY  1960    HX UROLOGICAL  2014    MESH ,SLING PLACED     HX UROLOGICAL  2016    REMOVAL OF SLING     HX UROLOGICAL  2014    CYSTOCCELE/ RECTOCELE     NC BIOPSY OF BREAST, INCISIONAL Bilateral        Social History     Socioeconomic History    Marital status:      Spouse name: Not on file    Number of children: Not on file    Years of education: Not on file    Highest education level: Not on file   Occupational History    Not on file   Tobacco Use    Smoking status: Every Day     Packs/day: 1.00     Years: 48.00     Pack years: 48.00     Types: Cigarettes    Smokeless tobacco: Never   Vaping Use    Vaping Use: Former    Substances: CBD, [address] [place] 2018    Devices: Pre-filled or refillable cartridge   Substance and Sexual Activity    Alcohol use: Yes     Comment: rarely    Drug use: Yes     Types: Marijuana, Prescription     Comment: nightly(pain relief)    Sexual activity: Not Currently   Other Topics Concern     Service No    Blood Transfusions No    Caffeine Concern No    Occupational Exposure No    Hobby Hazards No    Sleep Concern No    Stress Concern No    Weight Concern Yes    Special Diet No    Back Care Yes    Exercise Yes    Bike Helmet No    Seat Belt Yes    Self-Exams Yes   Social History Narrative    Not on file     Social Determinants of Health     Financial Resource Strain: Low Risk     Difficulty of Paying Living Expenses: Not hard at all   Food Insecurity: No Food Insecurity    Worried About Running Out of Food in the Last Year: Never true    Ran Out of Food in the Last Year: Never true   Transportation Needs: No Transportation Needs    Lack of Transportation (Medical): No    Lack of Transportation (Non-Medical): No   Physical Activity: Not on file   Stress: Not on file   Social Connections: Not on file   Intimate Partner Violence: Not on file   Housing Stability: Not on file       Family History   Problem Relation Age of Onset    Cancer Mother         BREAST     Breast Cancer Mother         dx age 48    Alcohol abuse Father     Breast Cancer Sister         dx age 48    [de-identified] Problems Neg Hx        Allergies   Allergen Reactions    Morphine Nausea Only    Nitrofurantoin Hives    Sulfa (Sulfonamide Antibiotics) Nausea Only and Unknown (comments)    Macrobid [Nitrofurantoin Monohyd/M-Cryst] Hives       Current Outpatient Medications   Medication Sig    hydroCHLOROthiazide (HYDRODIURIL) 12.5 mg tablet TAKE 1 TABLET BY MOUTH EVERY DAY FOR BLOOD PRESSURE    multivitamin (ONE A DAY) tablet Take 1 Tablet by mouth daily. b complex vitamins tablet Take 1 Tablet by mouth daily. cholecalciferol (VITAMIN D3) (1000 Units /25 mcg) tablet Take  by mouth daily. levothyroxine (SYNTHROID) 175 mcg tablet Take 1 Tablet by mouth Daily (before breakfast). lisinopriL (PRINIVIL, ZESTRIL) 20 mg tablet Take 1 Tablet by mouth daily. Indications: high blood pressure    melatonin 5 mg tablet Take  by mouth nightly. Takes 2 at bedtime    aspirin 81 mg chewable tablet Take 81 mg by mouth daily.     sertraline (ZOLOFT) 50 mg tablet Take 1 Tablet by mouth daily. pregabalin (Lyrica) 100 mg capsule Take 1 Capsule by mouth two (2) times a day. Max Daily Amount: 200 mg.    gabapentin (NEURONTIN) 300 mg capsule Take 1 Capsule by mouth three (3) times daily. Max Daily Amount: 900 mg.    acetaminophen (TylenoL) 325 mg tablet Take  by mouth every four (4) hours as needed for Pain. albuterol (PROVENTIL HFA, VENTOLIN HFA, PROAIR HFA) 90 mcg/actuation inhaler Take 2 Puffs by inhalation every six (6) hours as needed for Wheezing. (Patient not taking: Reported on 8/3/2022)     No current facility-administered medications for this visit. The above histories, medications and allergies have been reviewed. ROS    Visit Vitals  /80   Pulse 64   Temp 97.1 °F (36.2 °C)   Ht 5' 6.5\" (1.689 m)   Wt 160 lb (72.6 kg)   BMI 25.44 kg/m²     Physical Exam  Constitutional:       Appearance: Normal appearance. Cardiovascular:      Rate and Rhythm: Normal rate and regular rhythm. Pulmonary:      Effort: No respiratory distress. Breath sounds: Normal breath sounds. No wheezing. Abdominal:      General: There is no distension. Palpations: Abdomen is soft. There is no mass. Tenderness: There is no abdominal tenderness. Neurological:      Mental Status: She is alert. 1. Gastroesophageal reflux disease without esophagitis  Recommend EGD. Risks of the procedure were shared with the patient including the risks of aspiration or esophageal or gastric perforation. All questions were answered to the patient's satisfaction. We will schedule. 2. Rectal fullness  Recommend colonoscopy. The procedure was explained in detail including the risks and benefits. Risks shared included risks of missed lesions, incomplete exam, colon injury or perforation. Risks associated with anesthesia were also discussed. The patient wishes to proceed and we will schedule. 3. Hx of colonic polyps  See #2    4.  Diverticulosis  Encouraged fiber supplementation. Records were obtained and reviewed. She does have a history of Fairchild's esophagus. She also in the remote past had an esophageal stricture that was dilated. She also had diverticulosis and multiple small polyps in the past.  It appears her last set of endoscopies was in 2018 in [place]. The patient was counseled at length about the risks of radha Covid-19 during their perioperative period and any recovery window from their procedure. The patient was made aware that radha Covid-19  may worsen their prognosis for recovering from their procedure and lend to a higher morbidity and/or mortality risk. All material risks, benefits, and reasonable alternatives including postponing the procedure were discussed. The patient does  wish to proceed with the procedure at this time. Follow-up and Dispositions    Return for post procedure.          Shima Sagastume MD

## 2022-08-03 NOTE — LETTER
8/3/2022    Patient: Amber Kidd   YOB: 1955   Date of Visit: 8/3/2022     Desiree Rae NP  [address]  [place]    Dear Desiree Rae NP,      Thank you for referring Ms. Bradshaw Section to [facility] for evaluation. My notes for this consultation are attached. If you have questions, please do not hesitate to call me. I look forward to following your patient along with you.       Sincerely,    David Persaud MD

## 2022-09-21 ENCOUNTER — ANESTHESIA EVENT (OUTPATIENT)
Dept: SURGERY | Age: 67
End: 2022-09-21
Payer: MEDICARE

## 2022-09-21 NOTE — ANESTHESIA PREPROCEDURE EVALUATION
Relevant Problems   RESPIRATORY SYSTEM   (+) Other emphysema (HCC)      NEUROLOGY   (+) Depression with anxiety      GASTROINTESTINAL   (+) Gastroesophageal reflux disease without esophagitis      ENDOCRINE   (+) Acquired hypothyroidism       Anesthetic History               Review of Systems / Medical History      Pulmonary    COPD      Smoker (current; 50 pk yrs)         Neuro/Psych         Psychiatric history (depression, anxiety)    Comments: Lumbar spinal stenosis Cardiovascular    Hypertension                   GI/Hepatic/Renal     GERD           Endo/Other      Hypothyroidism       Other Findings            Physical Exam    Airway             Cardiovascular               Dental         Pulmonary                 Abdominal  GI exam deferred       Other Findings            Anesthetic Plan                      Procedure cancelled by surgeon

## 2022-09-30 NOTE — PERIOP NOTES
[address] [place] [address]  SURGICAL PRE-ADMISSION INSTRUCTIONS    ARRIVAL  You will be called the day before your surgery with your expected arrival time. Sign in at the  of the hospital.  You will be directed to the Surgical Waiting Room. Please arrive at your scheduled appointment time. You have been scheduled to arrive for your procedure one or two hours prior to the expected start time of your procedure. Every effort will be made to minimize your wait but please be aware that unforeseen circumstances may affect our schedule. EATING  DO NOT EAT OR DRINK ANYTHING AFTER MIDNIGHT ON THE EVENING BEFORE YOUR SURGERY OR ON THE DAY OF YOUR SURGERY except for your medications (as instructed) with a sip of water. Do not use gum, mints or lozenges on the morning of your surgery. Please do not smoke or chew tobacco before your surgery. MEDICATIONS   Take the following medications on the morning of your surgery with the smallest amount of water possible : synthroid    STOP THESE MEDICATIONS AT THE TIMES LISTED BELOW  Aspirin ;  7 days before                                                NSAIDS (Indocin, Ibuprofen, Naprosyn) ;  7 days before  All diuretics (water pills): hydrochlorthiazide ;  do not take morning of     DRIVING/TRANSPORATION  Have a responsible adult to drive you home from the hospital and to stay with you over night. Please have them plan to remain in the hospital during your surgery. Your surgery will not be done if you do not have a responsible adult to take you home and to stay with you. If you have arranged for public transport, you must have a responsible adult to ride with you (who is not the ). You may not drive for 24 hours after anesthesia. PREPARATION  If you have a Living WiIl/Advance Directive, please bring a copy with you to scan into your chart.    Please DO NOT wear makeup or nail polish  Please leave valuables at home,  DO NOT wear jewelry. Wear loose, comfortable clothing that is large enough to cover a bulky dressing. SPECIAL INSTRUCTIONS:  Follow your surgeon's instructions for preoperative bowel prep.     Reviewed above preoperative instructions and answered questions by phone interview    Patient:  Kathlene Osler   Date:     September 30, 2022  Time:   10:27 AM    RN:  Marlo Champion    Date:     September 30, 2022  Time:   10:27 AM

## 2022-10-06 ENCOUNTER — ANESTHESIA (OUTPATIENT)
Dept: SURGERY | Age: 67
End: 2022-10-06
Payer: MEDICARE

## 2022-10-19 RX ORDER — SODIUM CHLORIDE 0.9 % (FLUSH) 0.9 %
5-40 SYRINGE (ML) INJECTION EVERY 8 HOURS
Status: CANCELLED | OUTPATIENT
Start: 2022-10-19

## 2022-10-19 RX ORDER — SODIUM CHLORIDE 0.9 % (FLUSH) 0.9 %
5-40 SYRINGE (ML) INJECTION AS NEEDED
Status: CANCELLED | OUTPATIENT
Start: 2022-10-19

## 2022-10-19 NOTE — H&P
History and Physical    HPI    77-year-old female who presents as a referral from [facility] for possible upper and lower endoscopy. Her last colonoscopy apparently was in September 2018 in [place]. They found 2 hyperplastic polyps. There also is mention on prior colonoscopies of diverticulosis. She states in the last 2 years she has developed some rectal fullness. She denies any melena or hematochezia with this. She states that when she feels like she has a moderate to large amount of stool in her rectum she has considerable pain. She has soreness after a bowel movement with this. She has had multiple pelvic surgeries. In particular she has had a hysterectomy. She had a cystocele and rectocele repair. She had a bladder sling. This was subsequently removed and according to her had erosion into the pelvic floor musculature. She also has a history of reflux esophagitis. There is some mention in the notes of the possibility of her having Fairchild's in the remote past.  Her most recent EGD apparently was in 2016 and showed some esophagitis but no dysplasia. She was on a PPI that she stopped on her own 2 years ago because of reading about the side effects and has put herself on vinegar. She has breakthrough episodes of reflux about 3-4 times a month. She still is actively smoking about a pack to a pack and a half a day and has done so for 50 years. She also have 4-5 drinks a week. She does have a history of hypertension and hypothyroidism. Other than the aforementioned pelvic surgery she sustained a fall and had 5 rib fractures on the left side that apparently underwent open reduction and internal fixation. She is also had spinal fusion.   Past Medical History:   Diagnosis Date    Anxiety     Arthritis     Cataract     Chronic obstructive pulmonary disease (HCC)     Chronic pain of right groin     after multiple complications from bladder sling that became imbedded    Depression     GERD (gastroesophageal reflux disease)     HTN (hypertension)     Lobular carcinoma in situ of breast 2009    right breast bx LCIS    Lumbar stenosis with neurogenic claudication 10/15/2021    Pneumonia     Spondylisthesis 10/15/2021    Thyroid disease     UTI (urinary tract infection)        Past Surgical History:   Procedure Laterality Date    HX ADENOIDECTOMY  1960    HX BLADDER SUSPENSION      Removed     HX BREAST BIOPSY Right 2009    LCIS    HX BREAST BIOPSY Left 2000's    punch bx benign had to be put under    HX CATARACT REMOVAL Bilateral     HX HYSTERECTOMY  2015    HX LYMPHADENECTOMY      HX OTHER SURGICAL  02/03/2022    plate inchest to stabalize fractured ribs    HX OTHER SURGICAL  11-    spinal infusion    HX TONSILLECTOMY  1960    HX UROLOGICAL  2014    MESH ,SLING PLACED     HX UROLOGICAL  2016    REMOVAL OF SLING     HX UROLOGICAL  2014    CYSTOCCELE/ RECTOCELE     ID BIOPSY OF BREAST, INCISIONAL Bilateral        Social History     Socioeconomic History    Marital status:      Spouse name: Not on file    Number of children: Not on file    Years of education: Not on file    Highest education level: Not on file   Occupational History    Not on file   Tobacco Use    Smoking status: Every Day     Packs/day: 1.00     Years: 48.00     Pack years: 48.00     Types: Cigarettes    Smokeless tobacco: Never   Vaping Use    Vaping Use: Former    Substances: CBD, QUIT VAPING IN 2018    Devices: Pre-filled or refillable cartridge   Substance and Sexual Activity    Alcohol use: Yes     Comment: social    Drug use: Yes     Types: Marijuana, Prescription     Comment: nightly(pain relief)    Sexual activity: Not Currently   Other Topics Concern     Service No    Blood Transfusions No    Caffeine Concern No    Occupational Exposure No    Hobby Hazards No    Sleep Concern No    Stress Concern No    Weight Concern Yes    Special Diet No    Back Care Yes  Exercise Yes    Bike Helmet No    Seat Belt Yes    Self-Exams Yes   Social History Narrative    Not on file     Social Determinants of Health     Financial Resource Strain: Low Risk     Difficulty of Paying Living Expenses: Not hard at all   Food Insecurity: No Food Insecurity    Worried About Running Out of Food in the Last Year: Never true    Shama of Food in the Last Year: Never true   Transportation Needs: No Transportation Needs    Lack of Transportation (Medical): No    Lack of Transportation (Non-Medical): No   Physical Activity: Not on file   Stress: Not on file   Social Connections: Not on file   Intimate Partner Violence: Not on file   Housing Stability: Not on file       Family History   Problem Relation Age of Onset    Cancer Mother         BREAST     Breast Cancer Mother         dx age 48   [facility] Alcohol abuse Father     Breast Cancer Sister         dx age 48   [facility] [de-identified] Problems Neg Hx        Allergies   Allergen Reactions    Morphine Nausea Only    Nitrofurantoin Hives    Sulfa (Sulfonamide Antibiotics) Nausea Only and Unknown (comments)    Macrobid [Nitrofurantoin Monohyd/M-Cryst] Hives       No current facility-administered medications for this encounter. Current Outpatient Medications   Medication Sig    hydroCHLOROthiazide (HYDRODIURIL) 12.5 mg tablet TAKE 1 TABLET BY MOUTH EVERY DAY FOR BLOOD PRESSURE    multivitamin (ONE A DAY) tablet Take 1 Tablet by mouth daily.  b complex vitamins tablet Take 1 Tablet by mouth daily.  cholecalciferol (VITAMIN D3) (1000 Units /25 mcg) tablet Take  by mouth daily.  levothyroxine (SYNTHROID) 175 mcg tablet Take 1 Tablet by mouth Daily (before breakfast).  lisinopriL (PRINIVIL, ZESTRIL) 20 mg tablet Take 1 Tablet by mouth daily. Indications: high blood pressure    melatonin 5 mg tablet Take  by mouth nightly. Takes 2 at bedtime    aspirin 81 mg chewable tablet Take 81 mg by mouth daily.     sertraline (ZOLOFT) 50 mg tablet Take 1 Tablet by mouth daily.  pregabalin (Lyrica) 100 mg capsule Take 1 Capsule by mouth two (2) times a day. Max Daily Amount: 200 mg.    gabapentin (NEURONTIN) 300 mg capsule Take 1 Capsule by mouth three (3) times daily. Max Daily Amount: 900 mg.  acetaminophen (TylenoL) 325 mg tablet Take  by mouth every four (4) hours as needed for Pain.  albuterol (PROVENTIL HFA, VENTOLIN HFA, PROAIR HFA) 90 mcg/actuation inhaler Take 2 Puffs by inhalation every six (6) hours as needed for Wheezing. (Patient not taking: No sig reported)       The above histories, medications and allergies have been reviewed. ROS    Visit Vitals  Ht 5' 6.5\" (1.689 m)   Wt 160 lb (72.6 kg)   BMI 25.44 kg/m²     Physical Exam  Constitutional:       Appearance: Normal appearance. Cardiovascular:      Rate and Rhythm: Normal rate and regular rhythm. Pulmonary:      Effort: No respiratory distress. Breath sounds: Normal breath sounds. No wheezing. Abdominal:      General: There is no distension. Palpations: Abdomen is soft. There is no mass. Tenderness: There is no abdominal tenderness. Neurological:      Mental Status: She is alert. 1. Gastroesophageal reflux disease without esophagitis  Recommend EGD. Risks of the procedure were shared with the patient including the risks of aspiration or esophageal or gastric perforation. All questions were answered to the patient's satisfaction. We will schedule. 2. Rectal fullness  Recommend colonoscopy. The procedure was explained in detail including the risks and benefits. Risks shared included risks of missed lesions, incomplete exam, colon injury or perforation. Risks associated with anesthesia were also discussed. The patient wishes to proceed and we will schedule. 3. Hx of colonic polyps  See #2    4. Diverticulosis  Encouraged fiber supplementation. Records were obtained and reviewed. She does have a history of Fairchild's esophagus.   She also in the remote past had an esophageal stricture that was dilated. She also had diverticulosis and multiple small polyps in the past.  It appears her last set of endoscopies was in 2018 in [place]. The patient was counseled at length about the risks of radha Covid-19 during their perioperative period and any recovery window from their procedure. The patient was made aware that radha Covid-19  may worsen their prognosis for recovering from their procedure and lend to a higher morbidity and/or mortality risk. All material risks, benefits, and reasonable alternatives including postponing the procedure were discussed. The patient does  wish to proceed with the procedure at this time.                Tita Gutierres MD

## 2022-10-20 ENCOUNTER — HOSPITAL ENCOUNTER (OUTPATIENT)
Age: 67
Setting detail: OUTPATIENT SURGERY
Discharge: HOME | End: 2022-10-20
Attending: SURGERY | Admitting: SURGERY
Payer: MEDICARE

## 2022-10-20 VITALS
WEIGHT: 160 LBS | OXYGEN SATURATION: 97 % | RESPIRATION RATE: 16 BRPM | SYSTOLIC BLOOD PRESSURE: 155 MMHG | HEIGHT: 66 IN | DIASTOLIC BLOOD PRESSURE: 96 MMHG | HEART RATE: 83 BPM | TEMPERATURE: 98 F | BODY MASS INDEX: 25.71 KG/M2

## 2022-10-20 PROCEDURE — 74011250636 HC RX REV CODE- 250/636: Performed by: SURGERY

## 2022-10-20 RX ORDER — SODIUM CHLORIDE 0.9 % (FLUSH) 0.9 %
5-40 SYRINGE (ML) INJECTION AS NEEDED
Status: DISCONTINUED | OUTPATIENT
Start: 2022-10-20 | End: 2022-10-20 | Stop reason: HOSPADM

## 2022-10-20 RX ORDER — SODIUM CHLORIDE, SODIUM LACTATE, POTASSIUM CHLORIDE, CALCIUM CHLORIDE 600; 310; 30; 20 MG/100ML; MG/100ML; MG/100ML; MG/100ML
125 INJECTION, SOLUTION INTRAVENOUS CONTINUOUS
Status: DISCONTINUED | OUTPATIENT
Start: 2022-10-20 | End: 2022-10-20 | Stop reason: HOSPADM

## 2022-10-20 RX ORDER — SODIUM CHLORIDE 0.9 % (FLUSH) 0.9 %
5-40 SYRINGE (ML) INJECTION EVERY 8 HOURS
Status: DISCONTINUED | OUTPATIENT
Start: 2022-10-20 | End: 2022-10-20 | Stop reason: HOSPADM

## 2022-10-20 RX ORDER — IBUPROFEN 800 MG/1
800 TABLET ORAL
COMMUNITY

## 2022-10-20 RX ADMIN — SODIUM CHLORIDE, POTASSIUM CHLORIDE, SODIUM LACTATE AND CALCIUM CHLORIDE 125 ML/HR: 600; 310; 30; 20 INJECTION, SOLUTION INTRAVENOUS at 07:29

## 2022-10-20 NOTE — PROGRESS NOTES
Patient has taken both ibuprofen and aspirin two days ago. She also was unable to tolerate her prep for colonoscopy. Offered EGD with anticipation that if larger lesion was seen would perform only limited biopsy vs attempt to re prep and hold on blood thinners and reschedule endoscopies. Patient has elected to cancel procedure and reschedule. We will see her in the office to discuss re prep and scheduling.

## 2022-10-20 NOTE — PERIOP NOTES
Patient cancelled per MD.  Patient dressed self, IV removed. Patient awake/alert, no sign/symptoms of distress, no complaints of pain.   Patient discharged to home patient ambulated from preoperative area to await arrival of ride home

## 2022-11-01 ENCOUNTER — OFFICE VISIT (OUTPATIENT)
Dept: FAMILY MEDICINE CLINIC | Age: 67
End: 2022-11-01
Payer: MEDICARE

## 2022-11-01 VITALS
DIASTOLIC BLOOD PRESSURE: 90 MMHG | HEART RATE: 78 BPM | BODY MASS INDEX: 25.36 KG/M2 | TEMPERATURE: 97.1 F | HEIGHT: 66 IN | RESPIRATION RATE: 20 BRPM | OXYGEN SATURATION: 96 % | SYSTOLIC BLOOD PRESSURE: 148 MMHG | WEIGHT: 157.8 LBS

## 2022-11-01 DIAGNOSIS — Z23 ENCOUNTER FOR IMMUNIZATION: ICD-10-CM

## 2022-11-01 DIAGNOSIS — I10 PRIMARY HYPERTENSION: ICD-10-CM

## 2022-11-01 DIAGNOSIS — R51.9 PERSISTENT HEADACHES: ICD-10-CM

## 2022-11-01 DIAGNOSIS — E03.9 ACQUIRED HYPOTHYROIDISM: ICD-10-CM

## 2022-11-01 DIAGNOSIS — F41.8 DEPRESSION WITH ANXIETY: Primary | ICD-10-CM

## 2022-11-01 PROCEDURE — 99214 OFFICE O/P EST MOD 30 MIN: CPT | Performed by: NURSE PRACTITIONER

## 2022-11-01 PROCEDURE — 36415 COLL VENOUS BLD VENIPUNCTURE: CPT | Performed by: NURSE PRACTITIONER

## 2022-11-01 PROCEDURE — G0008 ADMIN INFLUENZA VIRUS VAC: HCPCS | Performed by: NURSE PRACTITIONER

## 2022-11-01 PROCEDURE — 90694 VACC AIIV4 NO PRSRV 0.5ML IM: CPT | Performed by: NURSE PRACTITIONER

## 2022-11-01 RX ORDER — LISINOPRIL 20 MG/1
20 TABLET ORAL DAILY
Qty: 90 TABLET | Refills: 3 | Status: SHIPPED | OUTPATIENT
Start: 2022-11-01

## 2022-11-01 RX ORDER — HYDROCHLOROTHIAZIDE 12.5 MG/1
TABLET ORAL
Qty: 90 TABLET | Refills: 0 | Status: SHIPPED | OUTPATIENT
Start: 2022-11-01

## 2022-11-01 RX ORDER — IBUPROFEN 200 MG
1 TABLET ORAL EVERY 24 HOURS
Qty: 30 PATCH | Refills: 0 | Status: SHIPPED | OUTPATIENT
Start: 2022-11-01 | End: 2022-11-07 | Stop reason: DRUGHIGH

## 2022-11-01 RX ORDER — LEVOTHYROXINE SODIUM 175 UG/1
175 TABLET ORAL
Qty: 90 TABLET | Refills: 3 | Status: SHIPPED | OUTPATIENT
Start: 2022-11-01 | End: 2022-11-03 | Stop reason: SDUPTHER

## 2022-11-01 NOTE — PROGRESS NOTES
Subjective: Sujatha Salinas is a 69 y.o. female who presents today with the following:  Chief Complaint   Patient presents with    Headache     Clusters     Annual Wellness Visit     Subsequent      Immunization/Injection     Flu       Patient Active Problem List   Diagnosis Code    Chronic fatigue R53.82    Chronic pain syndrome G89.4    Depression with anxiety F41.8    Abnormal weight loss R63.4    Acquired hypothyroidism E03.9    Gastroesophageal reflux disease without esophagitis K21.9    Tobacco use Z72.0    Marijuana use F12.90    Nocturnal hypoxemia G47.34    Opiate dependence (HCC) F11.20    Benzodiazepine dependence ([facility]) F13.20    Other emphysema ([facility]) J43.8    Vitamin B12 deficiency E53.8    Vitamin D deficiency E55.9    Spondylisthesis M43.10    Lumbar stenosis with neurogenic claudication M48.062    Lumbar stenosis M48.061    S/P lumbar fusion Z98.1           HTN: Denies chest pain, dyspnea, palpitations, headache and blurred vision. Blood pressure elevated. Patient reports noncompliance with blood pressure medications due to depression. She states when she is feeling depressed, she just does not take her medications. She needs refills on her medications today. Headaches: Patient endorses history of trigeminal neuralgia and headaches previously seen by neurology in [place]. She states she feels these headaches could be coming from her stress, blood pressure. She denies N/V/D, no vision changes. She takes advil with some relief. Pain is worse during periods of stress. Depression: Patient endorses history of depression for many years but states the past couple of years had a lot of life stressors. Her daughter passed away in 2018 and she has a son who has mental health issues and a recent diagnosis of diabetes that lives with her. She states it is difficult to manage at times and she feels hopeless.  She has taken many medications in the past with no relief including Zoloft, Paxil, Wellbutrin and Klonopin. She talks to a counselor twice per month. She denies suicidal thoughts, ideation. depression screening addressed during visit     abuse screening addressed denies    learning assessment addressed reviewed nurses notes    fall risk addressed not at risk    HM: addressed-Flu vaccine today, Other care gaps deferred by patient due to acute symptoms     ROS:  Gen: denies fever, chills, fatigue, weight loss, weight gain  HEENT:denies blurry vision, nasal congestion, sore throat  Resp: denies dypsnea, cough, wheezing  CV: denies chest pain radiating to the jaws or arms, palpitations, lower extremity edema  Abd: denies nausea, vomiting, diarrhea, constipation  Neuro: denies numbness/tingling. +headache  Endo: denies polyuria, polydipsia, heat/cold intolerance  Heme: no lymphadenopathy  Psych: +Depression    Allergies   Allergen Reactions    Morphine Nausea Only    Nitrofurantoin Hives    Sulfa (Sulfonamide Antibiotics) Nausea Only and Unknown (comments)    Macrobid [Nitrofurantoin Monohyd/M-Cryst] Hives         Current Outpatient Medications:     lisinopriL (PRINIVIL, ZESTRIL) 20 mg tablet, Take 1 Tablet by mouth daily. Indications: high blood pressure, Disp: 90 Tablet, Rfl: 3    levothyroxine (SYNTHROID) 175 mcg tablet, Take 1 Tablet by mouth Daily (before breakfast). , Disp: 90 Tablet, Rfl: 3    hydroCHLOROthiazide (HYDRODIURIL) 12.5 mg tablet, TAKE 1 TABLET BY MOUTH EVERY DAY FOR BLOOD PRESSURE, Disp: 90 Tablet, Rfl: 0    ibuprofen (MOTRIN) 800 mg tablet, Take 800 mg by mouth., Disp: , Rfl:     multivitamin (ONE A DAY) tablet, Take 1 Tablet by mouth daily. , Disp: , Rfl:     b complex vitamins tablet, Take 1 Tablet by mouth daily. , Disp: , Rfl:     cholecalciferol (VITAMIN D3) (1000 Units /25 mcg) tablet, Take  by mouth daily. , Disp: , Rfl:     melatonin 5 mg tablet, Take  by mouth nightly. Takes 2 at bedtime, Disp: , Rfl:     aspirin 81 mg chewable tablet, Take 81 mg by mouth daily. , Disp: , Rfl:     acetaminophen (TylenoL) 325 mg tablet, Take  by mouth every four (4) hours as needed for Pain., Disp: , Rfl:     albuterol (PROVENTIL HFA, VENTOLIN HFA, PROAIR HFA) 90 mcg/actuation inhaler, Take 2 Puffs by inhalation every six (6) hours as needed for Wheezing.  (Patient not taking: No sig reported), Disp: 18 g, Rfl: 2    Past Medical History:   Diagnosis Date    Anxiety     Arthritis     Cataract     Chronic obstructive pulmonary disease (HCC)     Chronic pain of right groin     after multiple complications from bladder sling that became imbedded    Depression     GERD (gastroesophageal reflux disease)     HTN (hypertension)     Lobular carcinoma in situ of breast 2009    right breast bx LCIS    Lumbar stenosis with neurogenic claudication 10/15/2021    Pneumonia     Spondylisthesis 10/15/2021    Thyroid disease     UTI (urinary tract infection)        Past Surgical History:   Procedure Laterality Date    HX ADENOIDECTOMY  1960    HX BLADDER SUSPENSION      Removed     HX BREAST BIOPSY Right 2009    LCIS    HX BREAST BIOPSY Left 2000's    punch bx benign had to be put under    HX CATARACT REMOVAL Bilateral     HX HYSTERECTOMY  2015    HX LYMPHADENECTOMY      HX OTHER SURGICAL  02/03/2022    plate inchest to stabalize fractured ribs    HX OTHER SURGICAL  11-    spinal infusion    HX TONSILLECTOMY  1960    HX UROLOGICAL  2014    MESH ,SLING PLACED     HX UROLOGICAL  2016    REMOVAL OF SLING     HX UROLOGICAL  2014    CYSTOCCELE/ RECTOCELE     UT BIOPSY OF BREAST, INCISIONAL Bilateral        Social History     Tobacco Use   Smoking Status Every Day    Packs/day: 1.00    Years: 48.00    Pack years: 48.00    Types: Cigarettes   Smokeless Tobacco Never       Social History     Socioeconomic History    Marital status:    Tobacco Use    Smoking status: Every Day     Packs/day: 1.00     Years: 48.00     Pack years: 48.00     Types: Cigarettes    Smokeless tobacco: Never   Vaping Use    Vaping Use: Former    Substances: CBD, Nivia Vargasbethelin [address] [facility] 2018    Devices: Pre-filled or refillable cartridge   Substance and Sexual Activity    Alcohol use: Yes     Comment: social    Drug use: Yes     Types: Marijuana, Prescription     Comment: nightly(pain relief)    Sexual activity: Not Currently   Other Topics Concern     Service No    Blood Transfusions No    Caffeine Concern No    Occupational Exposure No    Hobby Hazards No    Sleep Concern No    Stress Concern No    Weight Concern Yes    Special Diet No    Back Care Yes    Exercise Yes    Bike Helmet No    Seat Belt Yes    Self-Exams Yes     Social Determinants of Health     Financial Resource Strain: Low Risk     Difficulty of Paying Living Expenses: Not hard at all   Food Insecurity: No Food Insecurity    Worried About Running Out of Food in the Last Year: Never true    Ran Out of Food in the Last Year: Never true   Transportation Needs: No Transportation Needs    Lack of Transportation (Medical): No    Lack of Transportation (Non-Medical): No       Family History   Problem Relation Age of Onset    Cancer Mother         BREAST     Breast Cancer Mother         dx age 48    Alcohol abuse Father     Breast Cancer Sister         dx age 48    Anesth Problems Neg Hx          Objective:     Visit Vitals  BP (!) 148/90 (BP 1 Location: Right arm, BP Patient Position: Sitting, BP Cuff Size: Adult)   Pulse 78   Temp 97.1 °F (36.2 °C) (Temporal)   Resp 20   Ht 5' 6\" (1.676 m)   Wt 157 lb 12.8 oz (71.6 kg)   SpO2 96%   BMI 25.47 kg/m²     Body mass index is 25.47 kg/m². General: Alert and oriented. No acute distress. Well nourished. Tearful at times during exam  HEENT :  Ears:TMs are normal. Canals are clear. Eyes: pupils equal, round, react to light and accommodation. Extra ocular movements intact. Nose: patent. Mouth and throat is clear. Neck:supple full range of motion no thyromegaly. Trachea midline, No carotid bruits.  No significant lymphadenopathy  Lungs[de-identified] clear to auscultation without wheezes, rales, or rhonchi. Heart :RRR, S1 & S2 are normal intensity. No murmur; no gallop  Abdomen: bowel sounds active. No tenderness, guarding, rebound, masses, hepatic or spleen enlargement  Back: no CVA tenderness. Extremities: without clubbing, cyanosis, or edema  Pulses: radial and femoral pulses are normal  Neuro: HMF intact. Cranial nerves II through XII grossly normal.  Motor: is 5 over 5 and symmetrical.   Deep tendon reflexes: +2 equal  Psych:appropriate behavior,affect and judgement. Results for orders placed or performed during the hospital encounter of 05/20/22   URINE CULTURE HOLD SAMPLE    Specimen: Serum; Urine   Result Value Ref Range    Urine culture hold        Urine on hold in Microbiology dept for 2 days. If unpreserved urine is submitted, it cannot be used for addtional testing after 24 hours, recollection will be required. CBC WITH AUTOMATED DIFF   Result Value Ref Range    WBC 7.5 3.6 - 11.0 K/uL    RBC 4.52 3.80 - 5.20 M/uL    HGB 14.6 11.5 - 16.0 g/dL    HCT 41.6 35.0 - 47.0 %    MCV 92.0 80.0 - 99.0 FL    MCH 32.3 26.0 - 34.0 PG    MCHC 35.1 30.0 - 36.5 g/dL    RDW 13.5 11.5 - 14.5 %    PLATELET 757 337 - 810 K/uL    MPV 9.8 8.9 - 12.9 FL    NRBC 0.0 0  WBC    ABSOLUTE NRBC 0.00 0.00 - 0.01 K/uL    NEUTROPHILS 59 32 - 75 %    LYMPHOCYTES 31 12 - 49 %    MONOCYTES 7 5 - 13 %    EOSINOPHILS 2 0 - 7 %    BASOPHILS 1 0 - 1 %    IMMATURE GRANULOCYTES 0 0.0 - 0.5 %    ABS. NEUTROPHILS 4.4 1.8 - 8.0 K/UL    ABS. LYMPHOCYTES 2.3 0.8 - 3.5 K/UL    ABS. MONOCYTES 0.5 0.0 - 1.0 K/UL    ABS. EOSINOPHILS 0.2 0.0 - 0.4 K/UL    ABS. BASOPHILS 0.1 0.0 - 0.1 K/UL    ABS. IMM.  GRANS. 0.0 0.00 - 0.04 K/UL    DF AUTOMATED      RBC COMMENTS NORMOCYTIC, NORMOCHROMIC     METABOLIC PANEL, COMPREHENSIVE   Result Value Ref Range    Sodium 142 136 - 145 mmol/L    Potassium 3.7 3.5 - 5.1 mmol/L    Chloride 104 97 - 108 mmol/L    CO2 27 21 - 32 mmol/L    Anion gap 11 5 - 15 mmol/L Glucose 103 (H) 65 - 100 mg/dL    BUN 13 6 - 20 MG/DL    Creatinine 0.56 0.55 - 1.02 MG/DL    BUN/Creatinine ratio 23 (H) 12 - 20      GFR est AA >60 >60 ml/min/1.73m2    GFR est non-AA >60 >60 ml/min/1.73m2    Calcium 9.3 8.5 - 10.1 MG/DL    Bilirubin, total 0.4 0.2 - 1.0 MG/DL    ALT (SGPT) 15 12 - 78 U/L    AST (SGOT) 11 (L) 15 - 37 U/L    Alk. phosphatase 82 45 - 117 U/L    Protein, total 7.1 6.4 - 8.2 g/dL    Albumin 3.6 3.5 - 5.0 g/dL    Globulin 3.5 2.0 - 4.0 g/dL    A-G Ratio 1.0 (L) 1.1 - 2.2     MAGNESIUM   Result Value Ref Range    Magnesium 2.0 1.6 - 2.4 mg/dL   LIPASE   Result Value Ref Range    Lipase 90 73 - 393 U/L   TROPONIN-HIGH SENSITIVITY   Result Value Ref Range    Troponin-High Sensitivity 25 0 - 51 ng/L   URINALYSIS W/MICROSCOPIC   Result Value Ref Range    Color YELLOW/STRAW      Appearance CLEAR CLEAR      Specific gravity 1.010 1.003 - 1.030      pH (UA) 7.0 5.0 - 8.0      Protein Negative NEG mg/dL    Glucose Negative NEG mg/dL    Ketone Negative NEG mg/dL    Bilirubin Negative NEG      Blood Negative NEG      Urobilinogen 0.2 0.2 - 1.0 EU/dL    Nitrites Negative NEG      Leukocyte Esterase Negative NEG      WBC 0-4 0 - 4 /hpf    RBC 0-5 0 - 5 /hpf    Epithelial cells FEW FEW /lpf    Bacteria Negative NEG /hpf   EKG, 12 LEAD, INITIAL   Result Value Ref Range    Ventricular Rate 61 BPM    Atrial Rate 61 BPM    P-R Interval 170 ms    QRS Duration 78 ms    Q-T Interval 410 ms    QTC Calculation (Bezet) 412 ms    Calculated P Axis 46 degrees    Calculated R Axis -18 degrees    Calculated T Axis 48 degrees    Diagnosis       Normal sinus rhythm  Leftward axis  No previous ECGs available  Confirmed by Rylie Mcdonough MD, Andressa Wilde ([id number]) on 5/22/2022 10:25:12 PM         No results found for this visit on 11/01/22. Assessment/ Plan:     1. Depression with anxiety    Patient given information for [employer] She states she will call her for appointment. - CBC WITH AUTOMATED DIFF;  Future  - LIPID PANEL; Future  - METABOLIC PANEL, COMPREHENSIVE; Future  - COLLECTION VENOUS BLOOD,VENIPUNCTURE; Future  - TSH 3RD GENERATION; Future      2. Primary hypertension    Education provided about importance of compliance with medications for blood pressure management and reduce sick of complications such as heart attack and stroke. - CBC WITH AUTOMATED DIFF; Future  - LIPID PANEL; Future  - METABOLIC PANEL, COMPREHENSIVE; Future  - COLLECTION VENOUS BLOOD,VENIPUNCTURE; Future  -- lisinopriL (PRINIVIL, ZESTRIL) 20 mg tablet; Take 1 Tablet by mouth daily. Indications: high blood pressure  Dispense: 90 Tablet; Refill: 3  - hydroCHLOROthiazide (HYDRODIURIL) 12.5 mg tablet; TAKE 1 TABLET BY MOUTH EVERY DAY FOR BLOOD PRESSURE  Dispense: 90 Tablet; Refill: 0        3. Acquired hypothyroidism    Controlled, continue current regimen  - - COLLECTION VENOUS BLOOD,VENIPUNCTURE; Future  - TSH 3RD GENERATION; Future  - levothyroxine (SYNTHROID) 175 mcg tablet; Take 1 Tablet by mouth Daily (before breakfast). Dispense: 90 Tablet; Refill: 3      4. Encounter for immunization    --INFLUENZA, FLUAD, (AGE 72 Y+), IM, PF, 0.5 ML    5. Persistent headaches    Education provided on importance of blood pressure management due to potential causation of headaches. Stress levels also contributing, psychiatry to discuss medication management of depression, stress.      Orders Placed This Encounter    COLLECTION VENOUS BLOOD,VENIPUNCTURE     Standing Status:   Future     Number of Occurrences:   1     Standing Expiration Date:   12/1/2022    Influenza, FLUAD, (age 72 y+), IM, PF, 0.5 mL    CBC WITH AUTOMATED DIFF     Standing Status:   Future     Number of Occurrences:   1     Standing Expiration Date:   12/1/2022    LIPID PANEL     Standing Status:   Future     Number of Occurrences:   1     Standing Expiration Date:   14/7/8736    METABOLIC PANEL, COMPREHENSIVE     Standing Status:   Future     Number of Occurrences:   1     Standing Expiration Date:   12/1/2022    TSH 3RD GENERATION     Standing Status:   Future     Number of Occurrences:   1     Standing Expiration Date:   11/1/2023    lisinopriL (PRINIVIL, ZESTRIL) 20 mg tablet     Sig: Take 1 Tablet by mouth daily. Indications: high blood pressure     Dispense:  90 Tablet     Refill:  3    levothyroxine (SYNTHROID) 175 mcg tablet     Sig: Take 1 Tablet by mouth Daily (before breakfast). Dispense:  90 Tablet     Refill:  3     ASk patient to come to PCP office to Repeat TSH in 8 weeks. hydroCHLOROthiazide (HYDRODIURIL) 12.5 mg tablet     Sig: TAKE 1 TABLET BY MOUTH EVERY DAY FOR BLOOD PRESSURE     Dispense:  90 Tablet     Refill:  0         Verbal and written instructions (see AVS) provided. Patient expresses understanding of diagnosis and treatment plan. Health Maintenance Due   Topic Date Due    Colorectal Cancer Screening Combo  Never done    Shingrix Vaccine Age 50> (1 of 2) Never done    Low dose CT lung screening  Never done    COVID-19 Vaccine (4 - Booster for Moderna series) 02/22/2022    Medicare Yearly Exam  10/19/2022         Patient will follow up in 6 months or sooner if needed.        ANDREW Felipe

## 2022-11-01 NOTE — TELEPHONE ENCOUNTER
Message routed to provider to approve refill. Requested Prescriptions     Pending Prescriptions Disp Refills    nicotine (NICODERM CQ) 14 mg/24 hr patch 30 Patch 0     Si Patch by TransDERmal route every twenty-four (24) hours for 30 days.          Last Office Visit With Provider 2022

## 2022-11-01 NOTE — PROGRESS NOTES
This is the Subsequent Medicare Annual Wellness Exam, performed 12 months or more after the Initial AWV or the last Subsequent AWV    I have reviewed the patient's medical history in detail and updated the computerized patient record. Assessment/Plan   Education and counseling provided:  Are appropriate based on today's review and evaluation             Depression Risk Factor Screening     3 most recent PHQ Screens 11/1/2022   PHQ Not Done -   Little interest or pleasure in doing things Several days   Feeling down, depressed, irritable, or hopeless Several days   Total Score PHQ 2 2   Trouble falling or staying asleep, or sleeping too much -   Feeling tired or having little energy -   Poor appetite, weight loss, or overeating -   Feeling bad about yourself - or that you are a failure or have let yourself or your family down -   Trouble concentrating on things such as school, work, reading, or watching TV -   Moving or speaking so slowly that other people could have noticed; or the opposite being so fidgety that others notice -   Thoughts of being better off dead, or hurting yourself in some way -   PHQ 9 Score -   How difficult have these problems made it for you to do your work, take care of your home and get along with others -       Alcohol & Drug Abuse Risk Screen    Do you average more than 1 drink per night or more than 7 drinks a week:  No    On any one occasion in the past three months have you have had more than 3 drinks containing alcohol:  No          Functional Ability and Level of Safety    Hearing: Hearing is good. Activities of Daily Living: The home contains: handrails  Patient does total self care      Ambulation: with no difficulty     Fall Risk:  Fall Risk Assessment, last 12 mths 11/1/2022   Able to walk? Yes   Fall in past 12 months? 1   Do you feel unsteady? 0   Are you worried about falling 0   Is TUG test greater than 12 seconds?  0   Is the gait abnormal? 0   Number of falls in past 12 months 1   Fall with injury?  1      Abuse Screen:  Patient is not abused       Cognitive Screening    Has your family/caregiver stated any concerns about your memory: no     Cognitive Screening: Normal - Clock Drawing Test    Health Maintenance Due     Health Maintenance Due   Topic Date Due    Colorectal Cancer Screening Combo  Never done    Shingrix Vaccine Age 50> (1 of 2) Never done    Low dose CT lung screening  Never done    COVID-19 Vaccine (4 - Booster for Charlcie Arn series) 02/22/2022    Flu Vaccine (1) 08/01/2022    Medicare Yearly Exam  10/19/2022       Patient Care Team   Patient Care Team:  Pedro Almeida NP as PCP - General (Nurse Practitioner)  Gladis Lesch, NP as PCP - [address] [place] [address] Provider  Latanya Lee MD as Physician (Orthopedic Surgery)  Leora Boucher MD as Surgeon (General Surgery)    History     Patient Active Problem List   Diagnosis Code    Chronic fatigue R53.82    Chronic pain syndrome G89.4    Depression with anxiety F41.8    Abnormal weight loss R63.4    Acquired hypothyroidism E03.9    Gastroesophageal reflux disease without esophagitis K21.9    Tobacco use Z72.0    Marijuana use F12.90    Nocturnal hypoxemia G47.34    Opiate dependence ([address] F11.20    Benzodiazepine dependence ([address] F13.20    Other emphysema ([address] J43.8    Vitamin B12 deficiency E53.8    Vitamin D deficiency E55.9    Spondylisthesis M43.10    Lumbar stenosis with neurogenic claudication M48.062    Lumbar stenosis M48.061    S/P lumbar fusion Z98.1     Past Medical History:   Diagnosis Date    Anxiety     Arthritis     Cataract     Chronic obstructive pulmonary disease ([address]     Chronic pain of right groin     after multiple complications from bladder sling that became imbedded    Depression     GERD (gastroesophageal reflux disease)     HTN (hypertension)     Lobular carcinoma in situ of breast 2009    right breast bx LCIS    Lumbar stenosis with neurogenic claudication 10/15/2021    Pneumonia Spondylisthesis 10/15/2021    Thyroid disease     UTI (urinary tract infection)       Past Surgical History:   Procedure Laterality Date    HX ADENOIDECTOMY  1960    HX BLADDER SUSPENSION      Removed     HX BREAST BIOPSY Right 2009    LCIS    HX BREAST BIOPSY Left 2000's    punch bx benign had to be put under    HX CATARACT REMOVAL Bilateral     HX HYSTERECTOMY  2015    HX LYMPHADENECTOMY      HX OTHER SURGICAL  02/03/2022    plate inchest to stabalize fractured ribs    HX OTHER SURGICAL  11-    spinal infusion    HX TONSILLECTOMY  1960    HX UROLOGICAL  2014    MESH ,SLING PLACED     HX UROLOGICAL  2016    REMOVAL OF SLING     HX UROLOGICAL  2014    CYSTOCCELE/ RECTOCELE     MT BIOPSY OF BREAST, INCISIONAL Bilateral      Current Outpatient Medications   Medication Sig Dispense Refill    ibuprofen (MOTRIN) 800 mg tablet Take 800 mg by mouth. hydroCHLOROthiazide (HYDRODIURIL) 12.5 mg tablet TAKE 1 TABLET BY MOUTH EVERY DAY FOR BLOOD PRESSURE 90 Tablet 0    multivitamin (ONE A DAY) tablet Take 1 Tablet by mouth daily. b complex vitamins tablet Take 1 Tablet by mouth daily. cholecalciferol (VITAMIN D3) (1000 Units /25 mcg) tablet Take  by mouth daily. levothyroxine (SYNTHROID) 175 mcg tablet Take 1 Tablet by mouth Daily (before breakfast). 90 Tablet 3    lisinopriL (PRINIVIL, ZESTRIL) 20 mg tablet Take 1 Tablet by mouth daily. Indications: high blood pressure 90 Tablet 3    melatonin 5 mg tablet Take  by mouth nightly. Takes 2 at bedtime      aspirin 81 mg chewable tablet Take 81 mg by mouth daily. acetaminophen (TylenoL) 325 mg tablet Take  by mouth every four (4) hours as needed for Pain. albuterol (PROVENTIL HFA, VENTOLIN HFA, PROAIR HFA) 90 mcg/actuation inhaler Take 2 Puffs by inhalation every six (6) hours as needed for Wheezing.  (Patient not taking: No sig reported) 18 g 2     Allergies   Allergen Reactions    Morphine Nausea Only    Nitrofurantoin Hives    Sulfa (Sulfonamide Antibiotics) Nausea Only and Unknown (comments)    Macrobid [Nitrofurantoin Monohyd/M-Cryst] Hives       Family History   Problem Relation Age of Onset    Cancer Mother         BREAST     Breast Cancer Mother         dx age 48    Alcohol abuse Father     Breast Cancer Sister         dx age 48    Anesth Problems Neg Hx      Social History     Tobacco Use    Smoking status: Every Day     Packs/day: 1.00     Years: 48.00     Pack years: 48.00     Types: Cigarettes    Smokeless tobacco: Never   Substance Use Topics    Alcohol use: Yes     Comment: social         Zaki Pop

## 2022-11-01 NOTE — PROGRESS NOTES
Chief Complaint   Patient presents with    Headache     Clusters     Annual Wellness Visit     Subsequent       1. \"Have you been to the ER, urgent care clinic since your last visit? [facility] since [facility]? \" No    2. \"Have you seen or consulted any other health care providers outside of the [address] [place] [address] since your last visit? \" No     3. For patients aged 39-70: Has the patient had a colonoscopy / FIT/ Cologuard? No      If the patient is female:    4. For patients aged 41-77: Has the patient had a mammogram within the past 2 years? Yes - no Care Gap present      5. For patients aged 21-65: Has the patient had a pap smear?  No

## 2022-11-01 NOTE — TELEPHONE ENCOUNTER
Pt stated that she would like a get a prescription of a nicotine patch 12 mg. 21mg was too much last time she had them. States that she forgot to tell provider during visit.

## 2022-11-01 NOTE — PROGRESS NOTES
Labs drawn from [facility], 1 attempt,  tolerated well, 2 SST and 1 lavender. Patient was administered Flu shot in Lt deltoid via IM. Patient tolerated Flu shot well. Medication information reviewed with patient, patient states understanding. Patient to resume routine medications at home. Patient given copy of AVS and VIIS with medication information and instructions for home. VIIS reviewed with patient and patient states understanding.

## 2022-11-02 LAB
ALBUMIN SERPL-MCNC: 4 G/DL (ref 3.5–5)
ALBUMIN/GLOB SERPL: 1.2 {RATIO} (ref 1.1–2.2)
ALP SERPL-CCNC: 77 U/L (ref 45–117)
ALT SERPL-CCNC: 13 U/L (ref 12–78)
ANION GAP SERPL CALC-SCNC: 8 MMOL/L (ref 5–15)
AST SERPL-CCNC: 9 U/L (ref 15–37)
BASOPHILS # BLD: 0.1 K/UL (ref 0–0.1)
BASOPHILS NFR BLD: 1 % (ref 0–1)
BILIRUB SERPL-MCNC: 0.3 MG/DL (ref 0.2–1)
BUN SERPL-MCNC: 14 MG/DL (ref 6–20)
BUN/CREAT SERPL: 24 (ref 12–20)
CALCIUM SERPL-MCNC: 9.4 MG/DL (ref 8.5–10.1)
CHLORIDE SERPL-SCNC: 107 MMOL/L (ref 97–108)
CHOLEST SERPL-MCNC: 184 MG/DL
CO2 SERPL-SCNC: 26 MMOL/L (ref 21–32)
CREAT SERPL-MCNC: 0.58 MG/DL (ref 0.55–1.02)
DIFFERENTIAL METHOD BLD: NORMAL
EOSINOPHIL # BLD: 0.1 K/UL (ref 0–0.4)
EOSINOPHIL NFR BLD: 1 % (ref 0–7)
ERYTHROCYTE [DISTWIDTH] IN BLOOD BY AUTOMATED COUNT: 13 % (ref 11.5–14.5)
GLOBULIN SER CALC-MCNC: 3.4 G/DL (ref 2–4)
GLUCOSE SERPL-MCNC: 83 MG/DL (ref 65–100)
HCT VFR BLD AUTO: 45.8 % (ref 35–47)
HDLC SERPL-MCNC: 65 MG/DL
HDLC SERPL: 2.8 {RATIO} (ref 0–5)
HGB BLD-MCNC: 15.1 G/DL (ref 11.5–16)
IMM GRANULOCYTES # BLD AUTO: 0 K/UL (ref 0–0.04)
IMM GRANULOCYTES NFR BLD AUTO: 0 % (ref 0–0.5)
LDLC SERPL CALC-MCNC: 103.8 MG/DL (ref 0–100)
LYMPHOCYTES # BLD: 2.8 K/UL (ref 0.8–3.5)
LYMPHOCYTES NFR BLD: 34 % (ref 12–49)
MCH RBC QN AUTO: 32.3 PG (ref 26–34)
MCHC RBC AUTO-ENTMCNC: 33 G/DL (ref 30–36.5)
MCV RBC AUTO: 97.9 FL (ref 80–99)
MONOCYTES # BLD: 0.6 K/UL (ref 0–1)
MONOCYTES NFR BLD: 7 % (ref 5–13)
NEUTS SEG # BLD: 4.7 K/UL (ref 1.8–8)
NEUTS SEG NFR BLD: 57 % (ref 32–75)
NRBC # BLD: 0 K/UL (ref 0–0.01)
NRBC BLD-RTO: 0 PER 100 WBC
PLATELET # BLD AUTO: 250 K/UL (ref 150–400)
PMV BLD AUTO: 10.1 FL (ref 8.9–12.9)
POTASSIUM SERPL-SCNC: 4.1 MMOL/L (ref 3.5–5.1)
PROT SERPL-MCNC: 7.4 G/DL (ref 6.4–8.2)
RBC # BLD AUTO: 4.68 M/UL (ref 3.8–5.2)
SODIUM SERPL-SCNC: 141 MMOL/L (ref 136–145)
TRIGL SERPL-MCNC: 76 MG/DL (ref ?–150)
TSH SERPL DL<=0.05 MIU/L-ACNC: 8.07 UIU/ML (ref 0.36–3.74)
VLDLC SERPL CALC-MCNC: 15.2 MG/DL
WBC # BLD AUTO: 8.2 K/UL (ref 3.6–11)

## 2022-11-02 NOTE — ACP (ADVANCE CARE PLANNING)
Discussed importance of advanced medical directives with patient. Patient is capable of making decisions. Jessica MACIELC

## 2022-11-03 DIAGNOSIS — Z23 ENCOUNTER FOR IMMUNIZATION: ICD-10-CM

## 2022-11-03 DIAGNOSIS — F41.8 DEPRESSION WITH ANXIETY: ICD-10-CM

## 2022-11-03 DIAGNOSIS — I10 PRIMARY HYPERTENSION: ICD-10-CM

## 2022-11-03 DIAGNOSIS — E03.9 ACQUIRED HYPOTHYROIDISM: ICD-10-CM

## 2022-11-03 RX ORDER — LEVOTHYROXINE SODIUM 175 UG/1
175 TABLET ORAL
Qty: 90 TABLET | Refills: 3 | Status: SHIPPED | OUTPATIENT
Start: 2022-11-03

## 2022-11-07 NOTE — PROGRESS NOTES
Results have already been viewed by patient on my chart so a message has been sent to their my chart account with provider instructions.

## 2023-02-21 ENCOUNTER — APPOINTMENT (OUTPATIENT)
Dept: BEHAVIORAL/MENTAL HEALTH | Age: 68
End: 2023-02-21

## 2023-02-23 ENCOUNTER — APPOINTMENT (OUTPATIENT)
Dept: BEHAVIORAL/MENTAL HEALTH | Age: 68
End: 2023-02-23

## 2023-03-13 NOTE — BH NOTES
INTAKE SCREENING TOOL    Gretchen Mosley  3/13/2023, 0900    Patient Information   Phone: [phone]    Address: [facility]   [address] [facility] [address]   : 1955   Social Security #: NA   Age: 69   Sex: Female   Living Arrangements: With family (son lives with her)   Date of Last Inpatient Admission:  None known   Legal Guardian/POA: NA   Phone: NA   Communicates Verbally: yes       Chief Complaint/Symptoms (Describe reason for seeking help and describe symptoms as per DSM V. Jenna Wong is a 69year old, ,  female who has lived in [place] from [place] since 2019. She has been in therapy since she moved but she reports that the level of depression has maintained to the point she only gets out of her room to go to appointments. She reports she is isolating and avoiding responsibilities and she has had suicidal thoughts in the past and some current but there are no intentions or plans. She will do things with family if they push her to such as she just took a short trip with her brother and his girlfriend. She reports she was \"forced to retire\" in 2019 after she took off to take care of her long term partner who passed away from cancer around this time. She had also lost her grown daughter to substance abuse in 2018. She currently lives in her brothers house here in [facility], he is an  who also has a house in [place]. Her son lives with her here as well. Her daughters father who she has been  to for over 27 years but never lived with her before had been living with her here and she finally  him over the past couple of months. She reports he has mental health issues and that he receives treatment at local [facility] for Schizophrenia. She is very anxious about getting older and what will happen to her son when she passes away. He recently had medical issues occur that led to his psychotic symptoms increasing and she struggles with managing and helping him. She is currently not taking any medication for depression and she has always not wanted to take anything but she is open to trying now based on how low her functioning is day to day. Current Stressors Due to Mental Illness and/or Substance Use   death, family issues, illness or family illness, recent move, and relationship issues    Explain all that is marked:  Death of her long term partner and her daughter, her sons illness as well as her own. She recently  her  even though they had no relationship up until recently for the 27 years they were . [place] other [place] her brother who lives in [place]. Previous Mental Health Treatment     Last inpatient admission: None known   Type: Individual  Where: Dasha Aguillon LCSW  When: 7/19 to 2/2023  Reason: Depression  LOS: 3 + years  Outcome: maintained but not improved   Type: Where:   When:   Reason:   LOS:  Outcome:   Type: Where:   When:   Reason:   LOS:  Outcome:   *Key: IP- Inpatient, OP- Outpatient, Res-Residential, NH- Nursing Home, AL- Assisted Living, Other   Current Psychiatrist: None. Phone:   Current Therapist: Dasha Aguillon LCSW  Phone:  [phone]     Trauma History   Abuse: None   If yes to any of the above describe: Other Traumatic Experience: Loss of her daughter to substance abuse in 2018. Loss of her long term partner in 2019 to liver cancer and being his caretaker for final months     Counter-indications to Restraints:None       Substance Use History   History of Substance Abuse: Yes  Are you currently craving: No:     Key: Blackouts, Tremors, Sweats, Delirium, Seizures, Nausea, Vomiting, Diarrhea, Abdominal Cramps, Hallucinations, Loss of Job, Loss of Spouse, Loss of Memory, Mood Swings, DUI's    **Use within the last 12 months**    Substance Use Age of Onset Date of Last Use Length of Sobriety Amount Used Route of  Use Pattern of Use BioMed. Cons Psych.  Cons Legal Cons   Alcohol            Amphet. Barbit. Benzo. Ari./Crk. Halluc. Heroin            Inhalant            Marijuana  teens   2/2023               Y    Nicotine            Opiates            PCP            Prescript. Synthetic            Tranq. Other              Substance Abuse Previous Treatment  Support Groups- AA: no, NA: no, Other: no   Sponsor:      LOS: Where: When: Reason: Type: Response:   LOS: Where:  When: Reason: Type: Response:   LOS: Where:  When: Reason: Type: Response:    *Key: IP-Inpatient, OP-Outpatient, Res- Residential, Other: Explain:     Family History of Mental Health and/or Substance Abuse   (Include Family History of Suicide Attempts)      Daughter had substance abuse issues. There was likely underlying mental health issues. Her son has been diagnosed with Schizophrenia. She had 10 siblings and she believe there were issues going on for some but it was always difficult to see as there was a lot always going on in their family. Support system/Recovery Environment:   Does individual live with other people who drink or use drugs? no  Explain:   Does individual have good supports in place (family, friends, coworkers, [employer], etc)? no  Explain: Limited to her son and brother in [address] She has few friends in the area. Does individual understand and accept his/her illness?  NA for SA   Patient's stage of change: NA for SA   Internal Motivation:     External Motivation:         Medical Information   Medical History: Hysterectomy, Bladder sling in 2014, high blood pressure, Hypothroidism   Surgical History:   Diet:   Current PCP: Antelmo Carson NP  Address:  Phone:  Date of last visit:  10/2022     Medical Conditions  Allergies: no COPD: no HIV/AIDS: no MRSA: no   Asthma: no CVA:no Hepatitis A,B or C: no Pregnant: no   Bulimia/Anorexia: no Cardiac: no HTN: yes Prosthesis: no   Uses C-PAP: no Cirrhosis: no IDM/IIDDM: no Seizure Disorder: no   Other: no Fractures: no Infectious Disease Type:         Vitals     BP: Temp: Pulse: Resp: O2: Ht: Wt: Medications: Current Medications as Per Patient  Medication Dose/Last Dose Frequency Compliance Referring Physician    Melatonin   5 mg  Prn qhs                                 Current Medication List Attached: no  Pharmacy:  Phone:    Do you have a history of a positive TB skin test: no  Are you experiencing any of these symptoms:   Cough > 3 weeks: N/A  Bloody Sputum: N/A  Unwanted weight loss: N/A  Night Sweats: N/A  ADLS: self-feeding, grooming, bathing, upper body dressing, lower body dressing, toileting, toilet transfer, shower transfer, tub transfer, and simple home management  Ambulatory: yes  With Assistance: none    Current Review of Symptoms  Constitutional: none Genitourinary: none   Skin: none Musculoskeletal: none   Eyes:  none Allergy/Heatologic/Endo: none   Cardiovascular: none Neurological:  none   Respiratory: none Psychiatric: anxiety and depression   Gastrointestinal: none Appetite changes: little appetite    Weight changes: unsure of number    Sleep pattern: sleeps poorly but also in bed most of the day       Mental Status Exam   Suicidal Ideation: yes  Means: NO  Degree of SI/HI, behavior and/or intentions: Low  Suicide Attempt: no  Explain:  Previous Attempts: no  Explain:    Protective Factors (list): Support of family, wants to be here for her son, awareness    Risk Factors (list): lack of social support, no structure or routine    Homicidal Ideation: no  Plans & Means:    Towards:  Depression:yes  Symptoms: depressed mood, anhedonia, weight loss, hypersomnia, fatigue, feelings of worthlessness/guilt, and suicidal thoughts without plan  Ami: no  Symptoms:   Anxiety: yes  Symptoms: poor concentration/attention, restlessness, and social avoidance  Hallucinations: no  Symptoms:   Delusions: no  Symptoms:   Explain:  Are hallucinations/delusions different form their norm: N/A  Explain:  Degree to which individual's impairment creates danger for themselves or others:   Aggression: No:   Explain:      [address]    Part 1 of 2  Adapted from the [facility] Suicide Severity Rating Scale Since Last Visit     Ask questions that are bold and underlined. Place X in Box. YES NO   Ask Questions 1 and 2  1) Wish to be Dead:   Person endorses thoughts about a wish to be dead or not alive anymore, or wish to fall asleep and not wake up. Have you wished you were dead or wished you could go to sleep and not wake up? X      2) Suicidal Thoughts:  General non specific thoughts of wanting to end one's life/die by suicide, \"I've thought about killing myself\" without general thoughts of ways to kill oneself/associated methods, intent, or plan. Have you actually had any thoughts of killing yourself? X    If YES to 2, ask questions 3,4,5 and 6. If NO to 2, go directly to question 6  3) Suicidal Thoughts with Method (without Specific Plan or Intent to Act):   Person endorses thoughts of suicide and has thought of at least one method during the assessment period. This is different than a specific plan with time, place or method details worked out. \"I thought about taking an overdose but I never made a specific plan as to when where or how I would actually do it. .. And I would never go through with it. \"    Have you been thinking about how you might kill yourself? X     4) Suicidal Intent (without Specific Plan): Active suicidal thoughts of killing oneself and patient reports having some intent to act on such thoughts, as opposed to \"I have the thoughts but I definitely will not do anything about them. \"    Have you had these thoughts and had some intention of acting on them?   X     5) Suicide Intent with Specific Plan:  Thoughts of killing oneself with details of plan fully or partially worked out and person has some intent to carry it out. Have you started to work out or worked out the details of how to kill yourself and do you intend to carry out this plan? X     6) Suicide Behavior:  Have you done anything, started to do anything, or prepared to do anything to end your life? Examples: Collected pills, obtained a gun, gave away valuables, wrote a will or suicide note, took out pills but didn't swallow any, held a gun but changed your mind or it was grabbed from your hand, went to the roof but didn't jump; or actually took pills, tried to shoot yourself, cut yourself, tried to hang yourself, etc.         X     Part 2 of 2  SUICIDE RISK SCREEN   Place \"X\" by each necessary risk factor in \"Risk\" Box   RISK FACTOR LOWER RISK MILD RISK MODERATE RISK HIGH RISK   1. Intent/Ambience No intent to die Minimal Intent Moderate Intent Clear Intent   2. Lethality of attempt (or Plan) None/Ideation Only [ Marla Snide [ ] Non-lethal [ ] Potentially lethal (florian., firearm, hanging, OD) [ ]   3. Prior Attempts 2-10 years ago [ ]  6-12 mos ago [ ] 1wk-6 mos ago  [ ]   4. Hopelessness Hopeful [ ]  Ambivalent [ Brayan Knee [ ]   5. Substance Abuse None Annie ]  Abuse [ ] Dependence [ ]   6. Support System Good Support [ ]  Conflicted [ X] None [ ]   7. Current Stressor Severity None [ ]  Moderate [ X] Severe [ ]   8. Loss & Trauma (Past 4 years) None [ ]  Serious [ X] Multiple [ ]   9. Gender Female [ Jacalyn Sandifer  Male [ ]    10. Age 4-12 [ ] 12-22 [ ] 23-67 [ Jacalyn Sandifer 74+ [ ]   6. Marital Status /Partner   [ ] Single [ ]  Annie ]  [ ]   15. Sexual Orientation Heterosexual [ X]  LBGQT [ ]    15. Ethnicity Non-white/Black [ ]  White [ X]    14. Chronic/Severe/Illness and/or Functional Impairment None [ ] Acute Illness and/or mild functional impairment [ X] Chronic Illness and/or mild functional impairment [ ] Chronic Illness and/or moderate-to-severe functional impairment [ ]   15.  Level of Insomnia None [ ] 4-5 Hours of Sleep [ Prateek Avila 1-3 Hours of Sleep  [ ] No Sleep [ ]     Note:  Any 2 or more in any category triggers the higher level of risk. Please summarize your findings and indicate what factors contributed to the patients level of risk:               Alcohol Use Disorders Identification Test (Audit) Interview Version*   *Instructions:  Read the questions as written. Record the answers carefully. Begin the AUDIT by saying \"Now I am going to ask you some questions about your use of alcoholic beverages during the past year. \" Explain what is meant by \"alcoholic beverages\" by using local examples of beer, wine, vodka and so on. Record answers in terms of \"standard drinks. \" Place the correct answer number at the bottom under \"box score\". 1. How often do you have a drink containing alcohol?  (0) Never [Skip to Qs 9-10]  (1) Monthly or less  (2) 2 to 4 times a month  (3) 2 to 3 times a week  (4) 4 or more times a week      Box Score: na 6. How often during the last year have you needed a first drink in the morning to get yourself going after a heavy drinking session?  (0) Never  (1) Less than monthly  (2) Monthly  (3) Weekly  (4) Daily or almost daily    Box Score: na   2. How many drinks containing alcohol do you have on a typical day when you are drinking?  (0) 1 or 2  (1) 3 or 4  (2) 5 or 6  (3) 7,8 or 9  (4) 10 or more    Box Score: na 7. How often during the last year have you had a feeling of guilt or remorse after drinking?  (0) Never  (1) Less than monthly  (2) Monthly  (3) Weekly  (4) Daily or almost daily      Box Score: na   3. How often do you have six or more drinks on one occasion?  (0) Never  (1) Less than monthly  (2) Monthly  (3) Weekly  (4) Daily or almost daily  Skip to Questions 9 and 10 if the total score for Questions 2 and 3= 0    Box Score: na 8.  How often during the last year have you been unable to remember what happened the night before because you had been drinking?  (0) Never  (1) Less than monthly  (2) Monthly  (3) Weekly  (4) Daily or almost daily    Box Score: na   4. How often during the last year have you found that you were not able to stop drinking once you had started?  (0) Never  (1) Less than monthly  (2) Monthly  (3) Weekly  (4) Daily or almost daily    Box Score: na 9. Have you or someone else been injured as a result of your drinking?  (0) No  (2) Yes, but not in the last year  (4) Yes, during the last year          Box Score: na   5. How often during the last year have you failed to do what was normally expected from you because of drinking?  (0) Never  (1) Less than monthly  (2) Monthly  (3) Weekly  (4) Daily or almost daily    Box Score: na 10. Has a relative, friend, doctor, or another health worker been concerned about your drinking or suggested you cut down?  (0) No  (2) Yes, but not in the last year  (4) Yes, during the last year        Box Score: na   Scoring Key  8 to 15- simple advice focused on the reduction of hazardous drinking  16 to 19- brief counseling and continued monitoring  20 or above- further diagnostic evaluation for alcohol dependence     Record total of specific items here: 0     *This form is adapted from the [address] [place], [place] [address]s [facility]) Alcohol Use Disorders Identification Test (AUDIT) form, For more information, please see \"AUDIT- The Alcohol Use Disorders Identification Test: Guidelines for Use in Primary Care at [email or web address]      Checklist for Aggression Risk     Present Status Yes/No Wt. Score   1. Admission symptoms related to violence (actual assault, specific threat, attempted assault) no (3)    2. Specific identified victim * no (3)    3. Specific identified plan: no (3)    4. Access to or possession of means to carry out plan (available weapons) * no (3)    5. Intoxicated (alcohol, cocaine, crack or hallucinogens) no (3)    6. Acts upon paranoid ideation no (2)    7.  Exhibits command auditory hallucinations no (2)    8. Currently making threats* no (2)    9. Current physical agitation* no (2)    10. Non-communicative no (2)    11. Hallucinating no (1)    12. Paranoid Thoughts no (1)    13. Medication Non-compliance no (1)      Environmental Factors Yes/No Wt. Score   1. Aggressive behavior at time of admission* no (3)    2. Aggressive behavior within one week of admission no (2)    3. Aggression provoking factors within environment no (2)    4. Recent non-violent psychosocial stressor within environment yes (1)    5. Recent victim of aggression within environment no (1)    6. Male no (1)      Previous Clinical History Yes/No Wt. Score   1. Diagnosis of neurological impairment* no (3)    2. History of arrest/conviction for violent act (including arson) no (3)    3. History of harming other clients or staff * no (3)    4. History of non-compliance no (2)    5. History of arrest/conviction for violent crime within past year no (2)    6. History of paranoid diagnosis no (1)    7.  History of antisocial, borderline, or paranoid personality diagnosis no (1)      TOTAL SCORE OF ASSESSMENT: 1     (Score= [(1) Yes or (0) No] x Wt.)  *IF ITEM IS CHECKED \"YES\" TAKE IMMEDIATE PRECAUTIONS  SCORE KEY  PLEASE INDICATE SCORE WITH \"X\" IN APPROPRIATE BOX  High Probability 21 or Greater High Risk    Medium Probability 11 to 20 Moderate Risk    Low Probability 10 or Less Lower Risk X     Attitude: cooperative   Eye Contact: appropriate   Hygiene: good   Consciousness: alert   Dress: appropriate   Orientation: Person, Place, Time, and Situation   Mood: anxious  and depressed   Affect: anxious and depressed   Self Concept[de-identified] helpless   Speech: is pressured   Thought[de-identified] shows no evidence of impairment   Processing: concrete   Memory: intact   Judgement/Insight: fair   Fund of Knowledge: Intact   Intelligence: Average   SLUMS SCORE:             25                   (All patients with cognitive impairment and/or 65+ yo)     Initial Diagnostic Impression (Preliminary)   Axis I: Major Depressive Disorder, Recurrent, Moderate     Depression (0-10):  8   Anxiety (0-10): 6   Suicide Risk Score: low   Violence/Aggression Risk Score: low   Alcohol Audit Score: 0   Time Assessment Completed:  60 min         Psychiatrist Recommendation(s):  Psychiatrist Consulted: Dr. Morteza Vila Phone/Pager: [phone]   Date: 3/13/2023 Time: 1600   Patient Disposition Recommendation (IP, PHP, IOP, OP therapy):  Admit to IOP  Other:  Follow Up:  Name of Inpatient Facility/Program:  Outpatient Appointments:     Name of Psychiatrist/Agency:  Date of Appointment:  Time of Appointment:     Additional Follow Up Notes: (each staff that notes in this section, must sign and date)

## 2023-03-14 ENCOUNTER — HOSPITAL ENCOUNTER (OUTPATIENT)
Dept: BEHAVIORAL/MENTAL HEALTH | Age: 68
Discharge: HOME | End: 2023-03-14
Payer: MEDICARE

## 2023-03-14 PROBLEM — F33.1 MAJOR DEPRESSIVE DISORDER, RECURRENT EPISODE, MODERATE (HCC): Status: ACTIVE | Noted: 2020-07-20

## 2023-03-14 PROBLEM — F33.1 MAJOR DEPRESSIVE DISORDER, RECURRENT EPISODE, MODERATE (HCC): Chronic | Status: ACTIVE | Noted: 2020-07-20

## 2023-03-14 PROCEDURE — 90792 PSYCH DIAG EVAL W/MED SRVCS: CPT | Performed by: PSYCHIATRY & NEUROLOGY

## 2023-03-14 PROCEDURE — 90853 GROUP PSYCHOTHERAPY: CPT

## 2023-03-14 RX ORDER — CITALOPRAM 10 MG/1
10 TABLET ORAL DAILY
Qty: 30 TABLET | Refills: 1 | Status: SHIPPED | OUTPATIENT
Start: 2023-03-14

## 2023-03-14 NOTE — H&P
[facility] HISTORY AND PHYSICAL    Name:  Marija Reid  MR#:  [id number]  :  1955  ACCOUNT #:  [de-identified]  ADMIT DATE:  2023    [facility] OUTPATIENT PSYCHIATRIC EVALUATION    NOTE:  The patient is being admitted to the [facility] Intensive Outpatient Program.    HISTORY OF PRESENT ILLNESS:  Ms. Roby Solis is a 55-year-old  female who has a lengthy past psychiatric history of depression and some mild mood instability, who was referred to our IOP program due to worsening dysphoria and neurovegetative dysfunction. She states that she has been depressed \"most of my life,\" and describes a longstanding pattern of periods of low mood and dysphoria, with some degree of neurovegetative dysfunction, but also she reports having some slight mood instability at times as well. She has been through a number of stressors over the past 4 years or so, including her long-time partner dying from liver cancer in 2019. 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 She ended up having to move up to the [facility] and rents a house that her brother owns, and consequently her life has changed drastically to where she is fairly withdrawn and isolative, has little stimulation or activities to keep her occupied, and consequently she spends most of her time worrying about her 57-year-old son who has significant mental illness and who lives with her. She states that her sleep tends to be somewhat erratic, often very poor, but occasionally she sleeps excessively. She states her appetite is generally poor, but she has gained some weight back after losing close to 30 pounds starting back in 2019. Her energy is very low, however, and her motivation is also very poor.   She is very anhedonic and dysphoric much of the time, and she states her self-care has deteriorated. Her concentration is also more impaired and at times she can feel hopeless and even have some fleeting thoughts about suicide, but she states she has no real plans or intent of ever trying to end her life, just more passive thoughts about wishing she were dead on occasion. She has never made any attempts to try to harm herself. She does report having some very mild hypomanic-like moments, but these appear to be very brief and certainly not overly strong. She is a bit talkative and tends to ramble, that her mood seems more animated and occasionally elevated than one would expect with depression, but she has never had more florid symptoms of erendira at all. Similarly, there is no history or symptoms of psychosis including hallucinations or delusions. She denies any substance abuse issues, but it is listed in the computer that she may have had some difficulty with benzos and opiates, but she states she never took them inappropriately when they were prescribed. She does have a prescription for medical marijuana which she uses with some regularity. PAST PSYCHIATRIC HISTORY:  She has never been hospitalized, but has been in outpatient treatment off and on much of her adult life. She has been in therapy for the past 3 years with Salvador Gomez. She has tried some medicines in the past, many of which had side effects. She did take Zoloft with some success years ago, but when she retried it more recently, she had bad side effects. Wellbutrin almost made her hypomanic after being on it 5 days, and she took Paxil which she does not recall if it helped her. She does think Klonopin helped her greatly in the past.  She took Cymbalta at one point but had side effects, and she may have taken Trintellix, but cannot recall what the impact was. PAST MEDICAL HISTORY:  1. GERD. 2.  Hypothyroidism. 3.  Lumbar fusion. 4.  Fall in 02/2022 with five rib fractures.     CURRENT MEDICATIONS:  1.  Melatonin 5 mg at bedtime. 2.  Synthroid 175 mcg daily. 3.  Lisinopril 20 mg daily. 4. HCTZ 12.5 mg daily. 5.  Aspirin 81 mg daily. 6.  Multivitamin one daily. 7.  Vitamin D3 1000 units daily. ALLERGIES:  MORPHINE, SULFA, AND NITROFURANTOIN. FAMILY HISTORY:  She states that her son has been diagnosed with schizophrenia and may in fact be bipolar. Her daughter also had substance abuse issues and possibly some underlying mental health issues. She has nine siblings, but only four are living. SOCIAL HISTORY:  She has been  for many years. She lives in a home her brother owns that she rents in [facility]. Her 59-year-old son lives with her as well. She has been there since 2019 after moving from [place]. 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 She had two children, daughter whom is  in 2018 and a son age 46. MENTAL STATUS EXAMINATION:  Monico Acosta was noted to be a casually dressed, adequately groomed 68-year-old, who appeared her stated age. She was pleasant and cooperative with the interview. She was slightly talkative and ramble a bit but could easily be redirected. She endorsed having a number of depressive symptoms and her neurovegetative functioning appeared to be moderately impaired as noted above. She denied any current suicidal thoughts or intentions, but still has some fleeting thoughts about wishing she were dead on occasion. There is no evidence of any erendira or hypomania other than her talkativeness and slightly more animated mood than one would expect. There is no evidence of any psychosis such as hallucinations or delusions. Cognitively, she was fully oriented with what appeared to be at least an average fund of knowledge. Her concentration and attention span were mildly impaired based on the interview. DIAGNOSTIC IMPRESSION:  AXIS I:  1.   Major depression, recurrent, moderate severity (F33. 1). 2.  Consider element of bipolar disorder type II -- less likely (F31.81). AXIS II:  Deferred. AXIS III:  Hypothyroidism; gastroesophageal reflux disease; history of lumbar fusion. AXIS IV:  Stressors are moderate (financial concerns, worried about son's health, limited support system). AXIS V:  Global Assessment of Functioning currently is 55-60. PLAN:  1. We will admit the patient to the SOP program where she will come 3 days per week for three group sessions per day. 2.  Begin a trial on Celexa at 10 mg daily -- #30 pills with one refill. Plan to continue at a low dose and watch out for side effects or activation. 3.  Other options include Lexapro or other antidepressants at very low dosages, or perhaps very mild mood stabilizing agent if indicated. 4.  Estimated length of stay would likely be 2-3 months with a transition back to outpatient treatment.       Nick Douglass MD      [email or web address]  D:  03/14/2023 12:21  T:  03/14/2023 15:49  JOB #:  [id number]

## 2023-03-14 NOTE — BH NOTES
[facility] Outpatient Program  Group Therapy      Date of service: 3/14/2023  Start time: 1000  Stop time: 1050    Type of session: Therapy Group    Problem number: 1. Dep F 33.1    Short term goal (STG): We will work to establish theraputic rapport    Intervention/techniques: Informed, Modeled/Rehearsed, Prompted/Cued, Listened/Empathized, Promoted Peer Support, and Provided Feedback  Patient mental status/affect: Calm, Congruent, and Preoccupied  Patient behavior/appearance: Neatly Groomed, Attentive, Cooperative, and Needed Prompting    Special patient treatment accommodations provided (describe): None    Patient response and progress towards goals: Focus of session was on identifying why questions we may be asking ourselves and how it impacts our emotional well being. I shared with group about how we often cannot answer why questions and we spend a lot of energy and create a lot of emotions trying to answer them. We spoke about some common why questions including why me, why did this happen to me, why am I feeling this way, why am I thinking this?   I shared with group the need to focus on what they need to do to take care of themselves in that moment and move away from spending any more time on the why questions that we ask. I shared that the focus on why is focusing on the source where focusing on what focuses on solutions. Karen Son participated in group and she spoke about how she has been struggling here living in [place] since moving from [place] 3 years ago. She poke about how she has been overwhelmed with emotional issues and struggles since losing her daughter and partner within the same year which led to move here. She was open to group even after worrying that group may be overwhelming for her.       Behavioral Health Daily Check In form revealed that pt is not experiencing SI/HI thoughts or plans, remaining abstinent from drugs and alcohol, and reports goal today of  \"trying to adjust to group.\"

## 2023-03-14 NOTE — BH NOTES
[facility] Outpatient Program  Group Therapy    Date of Service: 3/14/2023  Start time: 1200  Stop time: 1250  Type of session: Therapy Group    Problem number: 1Dep F 33.1    Short term goal (STG): We will work to establish pt expectations from treatment    Intervention/techniques: Informed, Validated/Supported, Reframed, Prompted/Cued, Listened/Empathized, Promoted Peer Support, and Provided Feedback    Patient mental status/affect: Calm, Congruent, and Preoccupied    Patient behavior/appearance: Neatly Groomed, Attentive, Cooperative, and Needed Prompting    Special patient treatment accommodations provided (describe): None    Patient response and progress towards goals: Focus of session was based on information from website [employer] com which created a useful list of lessons from cognitive behavioral therapy. I reviewed the lessons covered which are situations cannot create feelings, only your thoughts and the situation can, notice unhelpful thoughts and replace them with helpful thoughts, take note of which behaviors make you feel better or worse, turn your focus to the task or the environment, avoidance makes anxiety worse, act the way you want to be, no matter how you feel, and let go of expectations. Group members were asked to share their thoughts on which lesson they can learn to apply and why that one may be able to help them. Darin Guaman participated well in group again and felt comfortable sharing her thoughts and feelings. She was able to share her emotional triggers and spoke about losing her daughter to drugs and how she has tried to work on still having healthy boundaries with her son who lives with her.

## 2023-03-15 ENCOUNTER — APPOINTMENT (OUTPATIENT)
Dept: BEHAVIORAL/MENTAL HEALTH | Age: 68
End: 2023-03-15
Payer: MEDICARE

## 2023-03-16 ENCOUNTER — HOSPITAL ENCOUNTER (OUTPATIENT)
Dept: BEHAVIORAL/MENTAL HEALTH | Age: 68
Discharge: HOME | End: 2023-03-16
Payer: MEDICARE

## 2023-03-16 PROCEDURE — 90853 GROUP PSYCHOTHERAPY: CPT

## 2023-03-16 NOTE — BH NOTES
[facility] Outpatient Program  Group Therapy    Date of Service: 3/16/2023  Start time: 1100  Stop time: 1150  Type of session: Therapy Group    Problem number: 1Dep. F33.1    Short term goal (STG): Pt will establish treatment goals    Intervention/techniques: Informed, Validated/Supported, Listened/Empathized, Observed/Monitored, Clarified, Reinforced, Facilitated Disclosure, and Provided Feedback    Patient mental status/affect: Congruent    Patient behavior/appearance: Attentive, Cooperative, and Motivated    Special patient treatment accommodations provided (describe): None    Patient response and progress towards goals: Focus of session was on anxiety and coping strategies for how to manage the after effects of it once it passes. We spoke about how the thoughts and emotions that we have after we have intense anxiety and/or panic attacks and that we can often make things prolonged or even worse. We spoke about the scariest part of anxiety for most people is the fact that we feel no control. We discussed the ability to control our reaction to it and how we can cope and think through anxiety and panic to just allow it to pass so we can move on. We spoke about ways to minimize even the worst anxiety that we can experience in terms of how we think about it. Group members were asked to discuss their coping skills and how to manage and make things better. Pt participated in the group activity and engaged with the other members. She shared that she was not going to take the medicine Dr. Kaley Betts prescribed on Tuesday as she took one dose and it made her feel wired, weird and she stayed in bed till 5:00 pm the next day.  Pt shared with the group that her daughter had  of an overdose and how dangerous she feels drugs are now in comparison to when she was younger

## 2023-03-16 NOTE — BH NOTES
[facility] Outpatient Program  Group Therapy    Date of Service: 3/16/2023  Start time: 1200  Stop time: 1250  Type of session: Therapy Group    Problem number: 1. Dep F 33.1    Short term goal (STG): We will work to establish pt expectations for treatment    Intervention/techniques: Informed, Validated/Supported, Prompted/Cued, Listened/Empathized, Promoted Peer Support, and Provided Feedback    Patient mental status/affect: Anxious, Congruent, and Preoccupied    Patient behavior/appearance: Neatly Groomed, Attentive, Cooperative, and Needed Prompting    Special patient treatment accommodations provided (describe): None    Patient response and progress towards goals: Focus of session was based on information from the book [employer] for Bipolar Disorder.   We spoke about how the importance and value of people being able to decide on what are healthy rewards for good choices. We spoke about how people who struggle with mood disorders and/or self esteem issues tend to not feel comfortable with rewarding self for good choices or tend to make new unhealthy choices to replace others. We went over ideas for what are healthy rewards and we went over the Rewards Chart from the book that will help pts track goals, specific target behaviors, and the rewards that would help increase well being and health. We discussed rewards that group members will start to work on using for their own healthy choices. Yolanda Gaines participated in group with prompting. She spoke about how she is struggling with pushing herself to get out of her room. We spoke about how he goal can be to get out of her room for at least an hour a day and we discussed what her reward can be. She said cinnamon donuts would be a good reward for this positive step in a healthy direction.

## 2023-03-16 NOTE — BH NOTES
[facility] Outpatient Program  Group Therapy  Initial Treatment Plan  PROBLEM  NUMBER 1.  PROBLEM: Depression     LONG TERM GOALS (LTG) / DISCHARGE CRITERIA:  Develop the ability to recognize, accept, & cope with feelings of depression. Alleviate depressed mood & return to previous level of effective functioning   Date Established Date Met, Continued, Revised or Discontinued STG  Letter SHORT TERM GOALS (STG) (What Client Will Do or Accomplish) By   Target Date   3/16/2023  A Pt will identify and share with group thoughts that contribute to depressed mood and identify a counter thought 4/13/2023   3/16/2023  B Pt will identify and share with group behaviors that contribute to depressed mood and identify an alternative behavior and share instances of making healthy choices 4/17/2023   3/16/2023  C Pt will identify times she has been able to use willingness to turn towards emotions and symptoms and how acceptance of the moment can help her get through crises    4/13/2023    3/16/2023  D Pt will report practicing journaling at least several times a week, primarily developing a thoughts Pipo Kerr in which she writes down thoughts and validates them as true or works to challenge them. 4/15/2023   3/16/2023      E Pt will create a schedule or routine including possible hobbies, activities, and connection with others to help her make progress to staying out of her room all day.'   4/10/2023   3/16/2023      F Pt will practice skill of radical acceptance and turning her mind (from [address]) to help her to move forward in the direction of acceptance versus denial of emotions and situations 4/15/2023   3/16/2023  G Pt will identify how she can build on her self-worth and acceptance in order to prevent future setbacks.  4/16/2023   3/16/2023  H   Pt will be able to share with group times she is making excuses to not do something positive for herself and the challenge she is going to put to that excuse.  4/12/2023   3/16/2023  I Pt will use stop, look, listen, think, and plan before acting once a day and identify healthy responses.  4/15/2023                                                 INTERVENTIONS  (What Staff Will Do)  Ask client to make a list of what depressed about & process list  Monitor & evaluate medication compliance and effectiveness on level of functioning  Encourage sharing of feelings of depression in order to clarify them & gain insight as to causes  Assign chemically dependent client to read passages related to depression from the books One Day at a Time (Yoly Burkett) and Each Day a New Beginning ([address] Staff). Assign client to write at least one positive affirmation statement daily regarding self  Monitor & redirect client as needed daily on grooming & hygiene. Assist in developing coping strategies (e.g. more physical exercise, less internal focus, increased social involvement, more assertiveness, greater need sharing, and more anger expression) for feelings of depression.

## 2023-03-16 NOTE — BH NOTES
[facility] Outpatient Program  Group Therapy      Date of service:  3/16/2023  Start time: 1000  Stop time: 1050    Type of session: Therapy Group    Problem number: 1. Dep F 33.1    Short term goal (STG): We will work to establish therapeutic rapport    Intervention/techniques: Informed, Modeled/Rehearsed, Prompted/Cued, Promoted Peer Support, and Provided Feedback  Patient mental status/affect: Congruent, Preoccupied, and Worried  Patient behavior/appearance: Neatly Groomed, Attentive, Cooperative, and Needed Prompting    Special patient treatment accommodations provided (describe): None    Patient response and progress towards goals: Focus of session was on identifying core beliefs that we may have that lead us to increased feelings of depression, anxiety, anger, and low self esteem. We spoke about the need to understand our beliefs that lead us to these painful emotions and feeling states. We spoke about how these can be challenged by working to provide pieces of evidence to the contrary to our negative core beliefs. We also spoke about how we can put this into practice when we are out in the world being challenged with negative thinking patterns. Jenna Wong participated in group with prompting. She was talkative once she began to share. She spoke about how she did not like the medication that Dr. Hal George put her on and she would like to try \"groups and not go down the path of medications. \"  We spoke about how she does not have any hobbies or routines that can help her right now. We spoke about how beneficial that can be for her right now. Behavioral Health Daily Check In form revealed that pt is not experiencing SI/HI thoughts or plans, remaining abstinent from drugs and alcohol, and reports goal today of \"to want to enjoy life again, and to keep coming to group. \"

## 2023-03-17 NOTE — BH NOTES
PSYCHOSOCIAL ASSESSMENT  :Patient identifying info: Mily Driscoll is a 69 y.o., female admitted on 3/14/2023    Presenting problem and precipitating factors: Evan Ford is a 69year old, ,  female who has lived in [place] from [place] since 2019. She has been in therapy since she moved but she reports that the level of depression has maintained to the point she only gets out of her room to go to appointments. She reports she is isolating and avoiding responsibilities and she has had suicidal thoughts in the past and some current but there are no intentions or plans. She will do things with family if they push her to such as she just took a short trip with her brother and his girlfriend. She reports she was \"forced to retire\" in 2019 after she took off to take care of her long term partner who passed away from cancer around this time. She had also lost her grown daughter to substance abuse in 2018. She currently lives in her brothers house here in local area, he is an  who also has a house in [address] [place] [address]. Her son lives with her here as well. Her daughters father who she has been  to for over 27 years but never lived with her before had been living with her here and she finally  him over the past couple of months. She reports he has mental health issues and that he receives treatment at local [facility] for Schizophrenia. She is very anxious about getting older and what will happen to her son when she passes away. He recently had medical issues occur that led to his psychotic symptoms increasing and she struggles with managing and helping him. She is currently not taking any medication for depression and she has always not wanted to take anything but she is open to trying now based on how low her functioning is day to day.      Mental status assessment: Mental status assessment: General appearance: well groomed, dressed appropriately  Eye contact: good eye contact  Speech: Spontaneous, pressured at times. Affect : Appropriate  Mood:  Fair  Thought Process: tangential but is aware  Perception: Denies AH or VH. Thought Content: denies SI or Plan  Insight: Good  Judgement: Good  Cognition: Intact     Strengths/Recreation/Coping Skills: Has family support, she is not doing anything at this time for recreation or hobbies. She is either in her room at home or at needed appointments. Collateral information: None    Current psychiatric /substance abuse providers and contact info: Sumeet Veloz LCSW  seeing for individual therapy since 2021. Previous psychiatric/substance abuse providers and response to treatment: In [place]    Family history of mental illness or substance abuse: Son has Schizophrenia, her daughter had drug abuse issues. Substance abuse history:    Social History     Tobacco Use    Smoking status: Every Day     Packs/day: 1.00     Years: 48.00     Pack years: 48.00     Types: Cigarettes    Smokeless tobacco: Never   Substance Use Topics    Alcohol use: Yes     Comment: social   Smoking THC daily    History of biomedical complications associated with substance abuse: None    Patient's current acceptance of treatment or motivation for change: Does not want to think about giving up [facility]    Family constellation: She is from a family of 10 children. She has a son who currently lives with her. She had a daughter who passed away in 2018. Is significant other involved? NA    Describe support system: Son Watson Rush. Brother Kang    Describe living arrangements and home environment: Lives with her son in her brothers home. GUARDIAN/POA: NO    Guardian Name: AUGUSTO    Guardian Contact: AUGUSTO    Health issues:   Hospital Problems  Date Reviewed: 3/14/2023   None      Trauma history: Lost her  and daughter within a year of each other. She took care of her  for 2+ years before he passed.       Legal issues: None    History of  service: None    Financial status: On Social Security and a small pension    Holiness/cultural factors: Not spiritual    Education/work history: 12th grade. 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       Andreia Sherman  3/17/2023

## 2023-03-17 NOTE — SUICIDE SAFETY PLAN
SAFETY PLAN    A suicide Safety Plan is a document that supports someone when they are having thoughts of suicide. Warning Signs that indicate a suicidal crisis may be developing: What (situations, thoughts, feelings, body sensations, behaviors, etc.) do you experience that lets you know you are beginning to think about suicide? 1. Thought of dying/death  2.   3. Internal Coping Strategies:  What things can I do (relaxation techniques, hobbies, physical activities, etc.) to take my mind off my problems without contacting another person? 1. Music  2. TV  3. She knows she needs to develop hobbies    People and social settings that provide distraction: Who can I call or where can I go to distract me? 1. Name: Fritz Pugh (brother)  Phone: number in phone  2. Name: Friends in [place]  Phone:  numbers in phone  3. Place:   [place]         4. Place:     People whom I can ask for help: Who can I call when I need help - for example, friends, family, clergy, someone else? 1. Name:  Fritz Pugh            Phone: brother  2. Name:   Phone:   3. Name:   Phone:     Professionals or [address] [place] [address] I can contact during a crisis: Who can I call for help - for example, my doctor, my psychiatrist, my psychologist, a mental health provider, a suicide hotline? 1. Clinician Name: Jessica VENTURA   Phone: [phone]      Clinician Pager or Emergency Contact #:     2. Clinician Name:    Phone:       Clinician Pager or Emergency Contact #:     3. Suicide Prevention Lifeline: 4-397-056-TALK (5534) or text hello to 351872    4. [address] [place] [address] Emergency Services -  for example, [facility] suicide hotline, [facility] Hotline: 2       Emergency Services Address:       Emergency Services Phone: 5 [phone]    Making the environment safe: How can I make my environment (house/apartment/living space) safer? For example, can I remove guns, medications, and other items? 1.  No meds or guns already in house  2.

## 2023-03-31 ENCOUNTER — HOSPITAL ENCOUNTER (OUTPATIENT)
Dept: BEHAVIORAL/MENTAL HEALTH | Age: 68
End: 2023-03-31
Payer: MEDICARE

## 2023-03-31 PROCEDURE — 90853 GROUP PSYCHOTHERAPY: CPT

## 2023-03-31 NOTE — BH NOTES
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 Pt will identify and share with group thoughts that contribute to depressed mood and identify a counter thought    Intervention/techniques: Informed, Validated/Supported, Reframed, Modeled/Rehearsed, Promoted Peer Support, and Provided Feedback  Patient mental status/affect: Anxious, Calm, Congruent, and Preoccupied  Patient behavior/appearance: Neatly Groomed, Attentive, Cooperative, and Needed Prompting    Special patient treatment accommodations provided (describe): None    Patient response and progress towards goals: Focus of session was on procrastination and the possible reasons behind why we do it. We spoke about fear and lack of confidence and esteem can play a major role in our reasons for doing this and we spoke about how we can also let being stubborn get in our way and that affects our ability to move forward. We spoke about how we can be contributing the reason to not do these things by how we talk to ourselves and that we tell ourselves that we are not strong enough and we are not capable. We addressed effective ways to avoid procrastination and that we need to refute our arguments aggressively as to why we are putting things off. We spoke about addressing specifically our reason for delay and developing fair arguments against delay and work to move forward. Saulo Finch participated in group with prompting. She spoke about how she is struggling a great deal with getting things done overall and that she continues to be in her room and \"zoning out on TV so I don't have to think about what I have going on in my life. \"  She was supportive of peer in group and encouraging to keep fighting for his kids as she wants to stay focused on being a good role model of recovery for her own son.    Wilton Lunsford Daily Check In form revealed that pt is not experiencing SI/HI thoughts or plans, remaining abstinent from drugs and alcohol, and reports goal today of \"to find something else to do other than staying in my room. \"

## 2023-03-31 NOTE — BH NOTES
[facility] Outpatient Program  Group Therapy    Date of Service: 3/31/2023  Start time: 1100  Stop time: 1150  Type of session: Therapy Group    Problem number: 1. Dep F 33.1    Short term goal (STG): B. Pt will identify and share with group behaviors that contribute to depressed mood and identify an alternative behavior and share instances of making healthy choices    Intervention/techniques: Informed, Modeled/Rehearsed, Prompted/Cued, Observed/Monitored, and Provided Feedback    Patient mental status/affect: Anxious, Congruent, and Preoccupied    Patient behavior/appearance: Neatly Groomed, Attentive, Cooperative, and Needed Prompting    Special patient treatment accommodations provided (describe): None    Patient response and progress towards goals: Focus of session was based on a handout about self-care and different aspects that we can focus on daily. We spoke about the different areas of focus which includes physical, emotional, workplace, psychological, relationship, and spiritual.  We spoke about the different strategies and what group members would like to focus on to improve their self-care for their short and long term recovery. Karmen Ferrera participated in group with prompting. She spoke about how she knows that she needs to find ways to enjoy her life again. She spoke about how she used to have hobbies but \"I am so old I am not sure of what else I can do. \"  We spoke about trying to old hobbies she used to love to do and she can likely rebuild on them. We spoke about how she can go out and be on her dock for a few minutes each day regardless of the weather. She agreed and knows she is zoning out on her phone and TV.

## 2023-04-04 ENCOUNTER — HOSPITAL ENCOUNTER (OUTPATIENT)
Dept: BEHAVIORAL/MENTAL HEALTH | Age: 68
End: 2023-04-04
Payer: MEDICARE

## 2023-04-04 ENCOUNTER — APPOINTMENT (OUTPATIENT)
Dept: BEHAVIORAL/MENTAL HEALTH | Age: 68
End: 2023-04-04

## 2023-04-04 PROCEDURE — 90853 GROUP PSYCHOTHERAPY: CPT

## 2023-04-04 NOTE — BH NOTES
[facility] Out[facility] Program  Group Therapy      Date of service:  4/4/2023  Start time: 1000  Stop time: 1050    Type of session: Therapy Group    Problem number: 1. Dep F 33.1    Short term goal (STG): C. Pt will identify times she has been able to use willingness to turn towards emotions and symptoms and how acceptance of the moment can help her get through crises    Intervention/techniques: Informed, Validated/Supported, Prompted/Cued, Listened/Empathized, Promoted Peer Support, and Provided Feedback  Patient mental status/affect: Anxious, Congruent, and Preoccupied  Patient behavior/appearance: Neatly Groomed, Attentive, Cooperative, and Needed Prompting    Special patient treatment accommodations provided (describe): None    Patient response and progress towards goals: Focus of session was a review of the weekend and helping group members see their wins that they can celebrate from the past several days. We spoke about the things that occurred and choices that they had in front of them and helping everyone to see the positive impact that they had on their well being. We also spoke about what may have been the setbacks and how they coped and what they can plan to do for the future to help build on relapse prevention. Justin Leon participated in group and she spoke about how she does not feel like she had any small successes. She spoke about how she has been trying to cope with feeling badly physically and is trying to see if it related to being depressed. We spoke about how she struggled with working towards the goals that she set for herself and we spoke about how she has to start with one small step. Behavioral Health Daily Check In form revealed that pt is not experiencing SI/HI thoughts or plans, remaining abstinent from drugs and alcohol, and reports goal today of  \"Im not sure. To try and find ways to cope other than smoking cigarettes and pot. \"

## 2023-04-04 NOTE — BH NOTES
[facility] Outpatient Program  Group Therapy    Date of Service: 4/4/2023  Start time: 1100  Stop time: 1150  Type of session: Therapy Group    Problem number: 1. Dep F 33.1    Short term goal (STG): D. Pt will report practicing journaling at least several times a week, primarily developing a thoughts Gara Appl in which she writes down thoughts and validates them as true or works to challenge them. Intervention/techniques: Informed, Prompted/Cued, Listened/Empathized, Promoted Peer Support, and Provided Feedback    Patient mental status/affect: Calm, Congruent, and Preoccupied    Patient behavior/appearance: Neatly Groomed, Attentive, Cooperative, Needed Prompting, and Caretaking    Special patient treatment accommodations provided (describe): None    Patient response and progress towards goals: Focus of session was about identifying certain self talk and how it impacts anger. We spoke about the statements we make to ourselves and how it increases our anger and our irritation. We went over the words should, shouldn't, must, ought, always, and need to. We spoke about what we cannot control such as other people and what they do and what they believe in. We spoke about challenges to the negative self talk that can include things like: you have the right to want things but others have the right to say no, you must accept some things in life, as is, and everyone has needs and priorities and others may not act the way we expect. We spoke about possible statements of self-acceptance to help us manage these particular thoughts and challenges to help ourselves in these situations. Christina Sorensen participated in group well and she spoke about how she always knew \"I was told to not say I can't. \"  She spoke about how she can see how important that is to not do and to say and believe. She is willing to look to see if she can notice self talk that is focusing on \"I can't. \"

## 2023-04-05 RX ORDER — CITALOPRAM 10 MG/1
10 TABLET ORAL DAILY
Qty: 90 TABLET | Refills: 1 | Status: SHIPPED
Start: 2023-04-05

## 2023-04-10 ENCOUNTER — HOSPITAL ENCOUNTER (OUTPATIENT)
Dept: BEHAVIORAL/MENTAL HEALTH | Age: 68
End: 2023-04-10
Payer: MEDICARE

## 2023-04-12 ENCOUNTER — HOSPITAL ENCOUNTER (OUTPATIENT)
Dept: BEHAVIORAL/MENTAL HEALTH | Age: 68
End: 2023-04-12
Payer: MEDICARE

## 2023-04-13 ENCOUNTER — HOSPITAL ENCOUNTER (OUTPATIENT)
Dept: BEHAVIORAL/MENTAL HEALTH | Age: 68
Discharge: HOME | End: 2023-04-13
Payer: MEDICARE

## 2023-04-13 PROCEDURE — 90853 GROUP PSYCHOTHERAPY: CPT

## 2023-04-14 ENCOUNTER — HOSPITAL ENCOUNTER (OUTPATIENT)
Dept: BEHAVIORAL/MENTAL HEALTH | Age: 68
Discharge: HOME | End: 2023-04-14
Payer: MEDICARE

## 2023-04-14 PROCEDURE — 90853 GROUP PSYCHOTHERAPY: CPT

## 2023-04-18 ENCOUNTER — HOSPITAL ENCOUNTER (OUTPATIENT)
Dept: BEHAVIORAL/MENTAL HEALTH | Age: 68
Discharge: HOME | End: 2023-04-18
Payer: MEDICARE

## 2023-04-18 PROCEDURE — 90853 GROUP PSYCHOTHERAPY: CPT

## 2023-04-18 NOTE — BH NOTES
[facility]  Group Therapy      Date of service: 4/18/2023  Start time: 1000  Stop time: 1050    Type of session: Therapy Group    Problem number: 1. Dep F 33.1    Short term goal (STG): F.  Pt will practice skill of radical acceptance and turning her mind (from DBT) to help her to move forward in the direction of acceptance versus denial of emotions and situations    Intervention/techniques: Informed, Prompted/Cued, Listened/Empathized, Promoted Peer Support, and Provided Feedback  Patient mental status/affect: Calm, Congruent, and Preoccupied  Patient behavior/appearance: Neatly Groomed, Attentive, Cooperative, and Needed Prompting    Special patient treatment accommodations provided (describe): None    Patient response and progress towards goals: Focus of session was based on information from website [employer] which created a useful list of lessons from cognitive behavioral therapy. I reviewed the lessons covered which are situations cannot create feelings, only your thoughts and the situation can, notice unhelpful thoughts and replace them with helpful thoughts, take note of which behaviors make you feel better or worse, turn your focus to the task or the environment, avoidance makes anxiety worse, act the way you want to be, no matter how you feel, and let go of expectations. Group members were asked to share their thoughts on which lesson they can learn to apply and why that one may be able to help them. Tawnya Guillory participated in group with prompting. She spoke about how she had a good weekend with her brother and how \"I know I went into caretaking mode and took care of him a great deal.\"  She was exhausted the next day and felt like she was back to being depressed. She is making some progress on understanding her own thoughts and behaviors. We will continue to explore options of what she can work to focus on now.       Behavioral Health Daily Check In form revealed that pt is not experiencing SI/HI thoughts or plans, remaining abstinent from drugs and alcohol, and reports goal today of \"to purge some clothes. \"

## 2023-04-18 NOTE — BH NOTES
[facility] Out[facility]  Group Therapy    Date of Service: 2023  Start time: 1100  Stop time: 1150  Type of session: Therapy Group    Problem number: 1Dep. F33.1    Short term goal (STG): H Pt will be able to share with group times she is making excuses to not do something positive for herself and the challenge she is going to put to that excuse.       Intervention/techniques: Informed, Validated/Supported, Listened/Empathized, Observed/Monitored, Clarified, and Reinforced    Patient mental status/affect: Calm and Congruent    Patient behavior/appearance: Attentive, Cooperative, Motivated, and Caretaking    Special patient treatment accommodations provided (describe): None    Patient response and progress towards goals: Focus of session was on developing a healthy stress management plan for dealing with triggers to stress, anxiety, and worry. We spoke about the importance of focusing on life experiences that have strengthened me and has taught us how to manage stress. We also spoke about the importance of having a positive support network of people who can help nurture us and encourage us, we spoke about attitudes and beliefs that have helped to protect us and help us view things differently, we addressed physical self care habits and that prepare us or help us relieve tension, as well as action skills that we can use to change the situation we are in right at the moment of experiencing stress. Pt participated in the group activity and discussion . She shared that she lost her daughter to drugs and still questions her actions in relation to her death. She allowed her daughter to go stay with her dad but they did not discuss her drug use with him and she  while living with him. She feels guilt related to this even though she knows she could not have controlled her daughters actions.

## 2023-04-18 NOTE — BH NOTES
[facility] Outpatient Program  Group Therapy    Date of Service: 4/18/2023  Start time: 1200  Stop time: 1250  Type of session: Therapy Group    Problem number: 1. Dep F 33.1    Short term goal (STG): CHRISTIANO  Pt will identify how she can build on her self-worth and acceptance in order to prevent future setbacks.     Intervention/techniques: Informed, Challenged, Modeled/Rehearsed, Prompted/Cued, Observed/Monitored, and Provided Feedback    Patient mental status/affect: Calm, Congruent, and Preoccupied    Patient behavior/appearance: Neatly Groomed, Attentive, Cooperative, and Needed Prompting    Special patient treatment accommodations provided (describe): None    Patient response and progress towards goals: Focus of session was on the benefits of gratitude and how a daily focus of this can benefit our emotional well-being and our mental health. We spoke about the impact that this can have on life satisfaction. We spoke about the different impacts it can have on people including our personality, emotional, social life, our health, and our careers. We spoke about how group members can start to practice this and how they can work to understand and appreciate the impact. Karmen Ferrera participated in group with prompting. She spoke about how she knows that she can see a lot of things to be grateful for and that she advocated with her brother about some repairs around the house and he is actually following through. She also spoke about how two neighbors have cut her grass again and she felt really good about that as well and stated her prayers have been answered.

## 2023-04-21 ENCOUNTER — HOSPITAL ENCOUNTER (OUTPATIENT)
Dept: BEHAVIORAL/MENTAL HEALTH | Age: 68
Discharge: HOME | End: 2023-04-21
Payer: MEDICARE

## 2023-04-21 PROCEDURE — 90853 GROUP PSYCHOTHERAPY: CPT

## 2023-04-25 ENCOUNTER — HOSPITAL ENCOUNTER (OUTPATIENT)
Dept: BEHAVIORAL/MENTAL HEALTH | Age: 68
Discharge: HOME | End: 2023-04-25
Payer: MEDICARE

## 2023-04-25 ENCOUNTER — HOSPITAL ENCOUNTER (OUTPATIENT)
Dept: BEHAVIORAL/MENTAL HEALTH | Age: 68
End: 2023-04-25

## 2023-04-25 PROCEDURE — 90853 GROUP PSYCHOTHERAPY: CPT

## 2023-04-25 NOTE — BH NOTES
[facility] Outpatient Program  Group Therapy    Date of Service: 4/25/2023  Start time: 1100  Stop time: 1150  Type of session: Therapy Group    Problem number: 1Dep. F33.1    Short term goal (STG): C .  Pt will identify times she has been able to use willingness to turn towards emotions and symptoms and how acceptance of the moment can help her get through crises.       Intervention/techniques: Informed, Validated/Supported, Listened/Empathized, Observed/Monitored, and Reinforced    Patient mental status/affect: Calm and Congruent    Patient behavior/appearance: Attentive, Cooperative, and Motivated    Special patient treatment accommodations provided (describe): None    Patient response and progress towards goals: Focus of session was on the Three C's of Cognitive Therapy and how they are Catch the thought that came before the emotion, Check and reflect on how accurate and useful the thought was, and Change the thought to a more accurate or helpful one as needed. I shared how this is an easy way to remember these three strategies and how important they are to overcome a lot of unnecessary stress and negative thoughts. Group members processed their thoughts and worked to recognize the unhealthy thoughts that are also irrational and untrue that they can work to let go of for their recovery. Pt participated in the discussion and engaged with the other members. She shared that at times she doesn't think she knows how to set a goal.  She was so busy working and just living/surviving that she didn't really set goals. She feels that she just went with each day and deal with what came along. The group discussed this and pt shared that she does know she might like to work part time. She is going to set a goal to go to the [facility] to inquire about part time work.

## 2023-04-25 NOTE — BH NOTES
[facility] Outpatient Program  Group Therapy        Date of service:  4/25/2023  Start time: 1000  Stop time: 1050     Type of session: Therapy Group     Problem number: 1. Dep F 33.1     Short term goal (STG): B. Pt will identify and share with group behaviors that contribute to depressed mood and identify an alternative behavior and share instances of making healthy choices     Intervention/techniques: Informed, Refocused, Assigned, Prompted/Cued, Listened/Empathized, Promoted Peer Support, and Provided Feedback  Patient mental status/affect: Calm, Congruent, and Preoccupied  Patient behavior/appearance: Neatly Groomed, Attentive, Cooperative, and Needed Prompting     Special patient treatment accommodations provided (describe): None     Patient response and progress towards goals: Focus of session was on article 5 Steps for Managing your Emotional Triggers.   We spoke about the importance of understanding the difference between reactions and responses to emotions. I shared with group members the steps which include: accepting responsibility for your reactions, to recognize that you are having an emotional reaction as soon as it appears, determine what triggered the emotion, choose what you want to feel and what you want to do, and to actively shift your emotional state. We spoke about what kind of differences that they may notice in practicing these steps and using these ideas in their emotional states. Christina Sorensen participated in group with prompting. She spoke about how she is going to go to the grief group today for the first time. She is not sure how she feels about it but she knows it would be helpful for her to do it. She is gaining insight into how isolation and not pushing herself to take some small steps is affecting her.        Behavioral Health Daily Check In form revealed that pt is not experiencing SI/HI thoughts or plans, remaining abstinent from drugs and alcohol, and reports goal today of  \"going to the grief group. \"

## 2023-04-25 NOTE — BH NOTES
[facility]  Group Therapy     Date of Service: 4/25/2023  Start time: 1200  Stop time: 1250  Type of session: Therapy Group     Problem number: 1. Dep F 33.1     Short term goal (STG): G. Pt will identify how she can build on her self-worth and acceptance in order to prevent future setbacks     Intervention/techniques: Informed, Validated/Supported, Prompted/Cued, Listened/Empathized, Promoted Peer Support, and Provided Feedback     Patient mental status/affect: Anxious, Congruent, and Preoccupied     Patient behavior/appearance: Neatly Groomed, Attentive, Cooperative, and Needed Prompting     Special patient treatment accommodations provided (describe): None     Patient response and progress towards goals: Focus of session was based on the handout created by Riri Argueta LCSW on 8 tips to help you heal from emotional wounds.   We spoke about the process of moving on from past hurts and I went over the tips of remembering that modest changes can significantly improve the quality of life, being patient and persistent, and viewing setbacks as part of the process and learning opportunities. We spoke about how these particular tips can help progress to move forward, even at a slow and steady pace. Chelita Antonio participated in group with prompting. She spoke about how she is struggling with letting go of the loss of her loved ones but we spoke about what she needs to focus on moving forward. She spoke about how she is aware that she has been avoiding addressing the grief and this is is step in the right direction to start to allow feelings to be present.

## 2023-04-26 ENCOUNTER — HOSPITAL ENCOUNTER (OUTPATIENT)
Dept: BEHAVIORAL/MENTAL HEALTH | Age: 68
Discharge: HOME | End: 2023-04-26
Payer: MEDICARE

## 2023-04-26 PROCEDURE — 90853 GROUP PSYCHOTHERAPY: CPT

## 2023-04-26 NOTE — BH NOTES
[facility] Outpatient Program  Group Therapy      Date of service:4/26/2023  Start time: 1000  Stop time: 1050    Type of session: Therapy Group    Problem number: 1Dep. F33.1    Short term goal (STG): A Pt will identify and share with group thoughts that contribute to depressed mood and identify a counter thought    Intervention/techniques: Informed, Validated/Supported, Clarified, Reinforced, and Provided Feedback  Patient mental status/affect: Congruent, Depressed, and Preoccupied  Patient behavior/appearance: Attentive, Cooperative, Motivated, and Caretaking    Special patient treatment accommodations provided (describe): None    Patient response and progress towards goals: Focus of session was on the Three C's of Cognitive Therapy and how they are Catch the thought that came before the emotion, Check and reflect on how accurate and useful the thought was, and Change the thought to a more accurate or helpful one as needed. I shared how this is an easy way to remember these three strategies and how important they are to overcome a lot of unnecessary stress and negative thoughts. Group members processed their thoughts and worked to recognize the unhealthy thoughts that are also irrational and untrue that they can work to let go of for their recovery. Pt shared with the group that she was able to attend the grief group yesterday . She felt it went well and was glad she went. Pt is still struggling with guilt feelings due to feeling she didn't do enough to help her daughter.  The group was supportive and shared ideas     Behavioral Health Daily Check In form revealed that pt is not experiencing SI/HI thoughts or plans, remaining abstinent from alcohol uses drugs, and reports no goal  today

## 2023-04-26 NOTE — BH NOTES
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  . Pt will report practicing journaling at least several times a week, primarily developing a thoughts Josette Atkinson in which she writes down thoughts and validates them as true or works to challenge them. Intervention/techniques: Informed, Validated/Supported, Prompted/Cued, Listened/Empathized, Promoted Peer Support, and Provided Feedback    Patient mental status/affect: Anxious, Congruent, and Preoccupied    Patient behavior/appearance: Neatly Groomed, Attentive, and Cooperative    Special patient treatment accommodations provided (describe): None    Patient response and progress towards goals: Focus of session was on a handout from [employer] on an Emotional Needs Checklist.  The handout asked questions to determine what needs may or may not be met at this time. It covered relationships that are healthy, safety and security, giving and receiving attention, making gains on goals, and feel in control most of the time. We spoke about what may be missing and how that impacts their functioning and well being. We spoke about strategies to incorporate to help meet the missing emotional needs. Jose Elias Rubio participated in group with prompting. She spoke about how she does understand her issues she can have with control. She spoke about how she felt a lack of control for so long but now she wants to be able to gain some of it. We spoke about how she can start with her day to day life right now. We spoke about areas in which she can try and gain some level of control.

## 2023-04-28 ENCOUNTER — HOSPITAL ENCOUNTER (OUTPATIENT)
Dept: BEHAVIORAL/MENTAL HEALTH | Age: 68
Discharge: HOME | End: 2023-04-28
Payer: MEDICARE

## 2023-04-28 PROCEDURE — 90853 GROUP PSYCHOTHERAPY: CPT

## 2023-05-02 ENCOUNTER — HOSPITAL ENCOUNTER (OUTPATIENT)
Dept: BEHAVIORAL/MENTAL HEALTH | Age: 68
Discharge: HOME | End: 2023-05-02
Payer: MEDICARE

## 2023-05-02 PROCEDURE — 90853 GROUP PSYCHOTHERAPY: CPT

## 2023-05-03 ENCOUNTER — HOSPITAL ENCOUNTER (OUTPATIENT)
Dept: BEHAVIORAL/MENTAL HEALTH | Age: 68
Discharge: HOME | End: 2023-05-03
Payer: MEDICARE

## 2023-05-03 PROCEDURE — 90853 GROUP PSYCHOTHERAPY: CPT

## 2023-05-05 DIAGNOSIS — F41.8 DEPRESSION WITH ANXIETY: ICD-10-CM

## 2023-05-05 DIAGNOSIS — Z23 ENCOUNTER FOR IMMUNIZATION: ICD-10-CM

## 2023-05-05 DIAGNOSIS — E03.9 ACQUIRED HYPOTHYROIDISM: ICD-10-CM

## 2023-05-05 DIAGNOSIS — I10 PRIMARY HYPERTENSION: ICD-10-CM

## 2023-05-05 RX ORDER — HYDROCHLOROTHIAZIDE 12.5 MG/1
TABLET ORAL
Qty: 90 TABLET | Refills: 0 | Status: SHIPPED | OUTPATIENT
Start: 2023-05-05

## 2023-05-09 ENCOUNTER — HOSPITAL ENCOUNTER (OUTPATIENT)
Facility: HOSPITAL | Age: 68
Discharge: HOME | End: 2023-05-12

## 2023-05-09 NOTE — PSYCHOTHERAPY
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  Pt will be able to share with group times she is making excuses to not do something positive for herself and the challenge she is going to put to that excuse    Intervention/techniques: Informed, Modeled/Rehearsed, Prompted/Cued, Promoted Peer Support, and Provided Feedback    Patient mental status/affect: Calm, Congruent, and Preoccupied    Patient behavior/appearance: Neatly Groomed, Attentive, Cooperative, and Needed Prompting    Special patient treatment accommodations provided (describe): None    Patient response and progress towards goals: Focus of session was based on handout from [address] [place] [address] on [address] [place] [address] and Anger.   We spoke about the impact that little and big frustrations and triggers to anger can have on us and how processing our thoughts and emotions is helpful to let things go more quickly. We spoke about things that we can do to feel better and how we can ask others to do things to help us feel better. We spoke about what group members will work on in regards to their own triggers and what is going on in their life right now and how they can learn the three step process of asking ourselves what happened, what do I feel, and what do I need? Aniyah Nahid participated in group with prompting. She spoke about how she has been trying to cope with her son and we spoke about how she worries about him being able to function on his own. She spoke about how she will try to put problem solving back on him rather than her try and fix all the problems that he presents.

## 2023-05-09 NOTE — PSYCHOTHERAPY
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  Pt will decide on a healthy system of reward for her to utilize as a way to give herself encouragement for managing impulses and behaviors and implement that system    Intervention/techniques: informed, refocused, challenged, prompted, listened, promoted peer support, provided feedback  Patient mental status/affect: calm, congruent  Patient behavior/appearance: neatly groomed, attentive, cooperative, caretaking    Special patient treatment accommodations provided (describe): None    Patient response and progress towards goals: Focus of session was on the article What We All Want and Need but Can't Control from Psychology Today. We spoke about how much we depend on the need for approval from others and how we are prewired to do this. We spoke about how we can try and break this need and learn to cope with others not liking us and we discussed the ideas of how to put the self back in self-esteem.  We reviewed the suggestions about talking back to negative voices in your head, take criticism as feedback, seek approval from self, and work to not worry what other people think. Denia Meneses participated in group with prompting. She spoke about how she is doing better in taking care of herself and she has been able to do good things for herself. She spoke about how she and her son appear to be doing better as well. She spoke about how she does not think that she has to be accepted by anyone one to be a good person. She spoke about how she agreed with the idea of being \"true to my nature\" but still being kind to others.       Behavioral Health Daily Check In form revealed that pt is not experiencing SI/HI thoughts or plans, remaining abstinent from drugs and alcohol, and reports goal today of

## 2023-05-10 ENCOUNTER — HOSPITAL ENCOUNTER (OUTPATIENT)
Facility: HOSPITAL | Age: 68
Discharge: HOME | End: 2023-05-13

## 2023-05-10 NOTE — PSYCHOTHERAPY
[facility] Outpatient Program  Group Therapy      Date of service:5/10/2023  Start time: 1000  Stop time: 1050    Type of session: Therapy Group    Problem number: 1Dep. F33.1    Short term goal (STG): I Pt will use stop, look, listen, think, and plan before acting once a day and identify healthy responses.      Intervention/techniques: Informed, validated, clarified    Patient mental status/affect: calm, congruent    Patient behavior/appearance: attentive, cooperative, caretaking    Special patient treatment accommodations provided (describe): None    Patient response and progress towards goals: Focus of session was on positive self talk when feeling anger. We spoke about how we can develop the skill of helping ourselves out of moments of anger that have a tendency to then impact your day and your functioning and well being. We spoke about the usual self talk that goes with anger and how it can make things worse and asked group members to share their anger thoughts that are typical for them. I shared some ideas for self talk of managing anger such as I don't need to prove myself in this situation or as long as I keep my cool, I am in control or people are going to act the way they want to, not the way I want them to.   We spoke about how these ideas can better sooth our anger and help us refocus on healthy self care. Pt shred that she really enjoyed the group yesterday. She felt so good that she stopped by two places to see if they were hiring. She likes that feeling when she gets out but at times when she goes home to starts to doubt herself. She wants to strive to keep going and resist the urge to put  herself down. Behavioral Health Daily Check In form revealed that pt is not experiencing SI/HI thoughts or plans, remaining abstinent from drugs and alcohol, and reports goal today of   keep coming to group, it is helpful.

## 2023-05-10 NOTE — PSYCHOTHERAPY
[facility] Outpatient Program  Group Therapy    Date of Service: 5/10/2023  Start time: 1200  Stop time: 1250  Type of session: Therapy Group    Problem number: 1Dep. F33.1    Short term goal (STG): G Pt will identify how she can build on her self-worth and acceptance in order to prevent future setbacks.      Intervention/techniques: Informed, Validated/Supported, Listened/Empathized, Observed/Monitored, Clarified, and Reinforced    Patient mental status/affect: Calm and Congruent    Patient behavior/appearance: Attentive, Cooperative, Motivated, and Caretaking    Special patient treatment accommodations provided (describe): None    Patient response and progress towards goals: Focus of session was based on an idea developed by Annemarie Montenegro (possibility therapy) and Aj Brown, Ph.D. (solution-focused therapy). It is called Do One Thing Different and it is supported by 12 step's motto and definition of insanity is doing the same thing over and over again and expecting different results.   I spoke with group members about the idea is to think about the things you do in a problem situation and change any part you can. We spoke about how we can change our reaction, our attitude, our tone of voice, our behavior, we can think about what others do to help themselves and what works and what has worked for us in the past.  We also spoke about picturing life and what we would feel like without this problem in it. I asked group members to identify one small thing that they can do different and how it can impact their life. Pt participated in the group activity and engaged with the other  members. She stated that she forgot to go to her grief group yesterday but does want to continue.   She knows she has experienced a lot of grief issues and feels it will help

## 2023-05-10 NOTE — PSYCHOTHERAPY
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  Pt will identify and share with group behaviors that contribute to depressed mood and identify an alternative behavior and share instances of making healthy choices.       Intervention/techniques: Informed, Modeled/Rehearsed, Prompted/Cued, Observed/Monitored, and Provided Feedback    Patient mental status/affect: Calm, Congruent, and Preoccupied    Patient behavior/appearance: Neatly Groomed, Attentive, Cooperative, and Needed Prompting    Special patient treatment accommodations provided (describe): None    Patient response and progress towards goals: Patient response and progress towards goals: Focus of session was based on handout from [facility] on [address] [place] [address] and Anger.   We spoke about the impact that little and big frustrations and triggers to anger can have on us and how processing our thoughts and emotions is helpful to let things go more quickly. We spoke about things that we can do to feel better and how we can ask others to do things to help us feel better. We spoke about what group members will work on in regards to their own triggers and what is going on in their life right now and how they can learn the three step process of asking ourselves what happened, what do I feel, and what do I need? Vinicio Reyna participated in group with prompting. She spoke about how she sees that she and her son are making some good improvements in how they talk to each other. She is worried about him and how much he has changed but she knows that she is trying to accept him with the changes he is going through. She spoke about how he shaved his head and she thinks it is because he saw that she was starting to take better care of herself and it may have encouraged him to do the same.

## 2023-05-16 ENCOUNTER — HOSPITAL ENCOUNTER (OUTPATIENT)
Facility: HOSPITAL | Age: 68
Discharge: HOME | End: 2023-05-19

## 2023-05-16 NOTE — BH NOTE
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      Intervention/techniques: Informed, Prompted/Cued, Listened/Empathized, Promoted Peer Support, and Provided Feedback    Patient mental status/affect: Calm and Congruent    Patient behavior/appearance: Neatly Groomed, Attentive, and Cooperative    Special patient treatment accommodations provided (describe): None    Patient response and progress towards goals: Focus of session was on conflict resolution and strategies to help us keep calm in a conflict with others. I shared how conflicts can increase the production of cortisol and adrenaline and how that prepares us to either take flight or fight. We spoke about how conflicts can be over something pretty minor to values and very important issues in our lives. I shared how the increase in hormones affects us when we need to be clear minded and aware but those hormones can create a disorientation and confusion. I shared with group some strategies to help us stay calm in a conflict and they included taking deep breaths which actually interferes with the production of these hormones, focusing on body and where we can work to relax it, listen carefully and ask open ended questions, lower our voices, and work to let go and agree to disagree with others. We spoke about with whom group members can practice these strategies. Thaddeus Coles participated in group well and she spoke about how her ability to manage conflicts depends on the people involved as well as what the issue may be. \"If someone tries to attack me or someone I love, my [place] will come out. \"  She spoke about how she knows that she needs to work on her communication with her son and how to
today of  \"keep coming to group. \"

## 2023-05-16 NOTE — PSYCHOTHERAPY
[facility]  Group Therapy    Date of Service: 5/16/2023  Start time: 1100  Stop time: 1150  Type of session: Therapy Group    Problem number: 1Dep. F33.1    Short term goal (STG): H Pt will be able to share with group times she is making excuses to not do something positive for herself and the challenge she is going to put to that excuse    Intervention/techniques: Informed, Validated/Supported, Guided, Challenged, Reframed, Listened/Empathized, Observed/Monitored, Clarified, and Reinforced    Patient mental status/affect: Calm and Congruent    Patient behavior/appearance: Attentive, Cooperative, and Motivated    Special patient treatment accommodations provided (describe): None    Patient response and progress towards goals: Focus of session was on issues and emotional baggage from the past and how it may continue to be impacting our functioning today. We discussed how we can have anger towards family members and that may have been what has protected us from getting hurt again. I spoke with group about the need to accept other people's limitations and how if we don't accept that, then we get continuously hurt and angry towards others. We spoke about how we have to move forward and that may include accepting a need to forgive others and to accept them as they are. If we cannot be around them much, then that is how they take care of themselves. Group members were asked to share something that they can let go of today from the past so they can lighten their burden one step at a time. Pt participated in  the group discussion and activity. She followed along with the worksheet related to emotional baggage. She shared with the group that she is from a large family and she feels that there is a lot of baggage that she has had to think about and deal with.   At this time in her life she is trying not to stress over past issues and concentrate on the present and dealing with her son

## 2023-05-17 ENCOUNTER — HOSPITAL ENCOUNTER (OUTPATIENT)
Facility: HOSPITAL | Age: 68
Discharge: HOME | End: 2023-05-20

## 2023-05-17 NOTE — BH NOTE
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 Pt will report practicing journaling at least several times a week, primarily developing a thoughts Wanna Shaw in which she writes down thoughts and validates them as true or works to challenge them    Intervention/techniques: Informed, Reframed, Modeled/Rehearsed, Promoted Peer Support, and Provided Feedback    Patient mental status/affect: Anxious, Calm, Congruent, and Preoccupied    Patient behavior/appearance: Neatly Groomed, Attentive, Cooperative, and Needed Prompting    Special patient treatment accommodations provided (describe): None    Patient response and progress towards goals: Focus of session was on the characteristics of healthy relationships and how being in a generally healthy relationship can bring us much of what we need in regards to support and help us in our well being. We spoke about the characteristics that are most important to us and they include trust and honesty, willingness to communicate and compromise, willingness to let people be who they are and have their own opinions which may not be agreed with but respected. We addressed the qualities that we see in unhealthy relationships as well and they include any type of abuse, not willing to accept peoples own individuality and nagging rather than communicating. Kiana Mcelroy participated in group with prompting. She was easy to engage and she continues to be be supportive of others. She spoke about how she does not have any connections other than her son and her brother. We spoke about how she has gone to the grief group and there are others there who she can see some possible connection to. She spoke about how she may consider going to a [place] party in her neighborhood.

## 2023-05-17 NOTE — BH NOTE
[facility] Outpatient Program  Group Therapy        Date of Service: 5/17/2023  Start time: 1000  Stop time: 1050  Type of session: Therapy Group    Problem number: 1Dep. F33.1    Short term goal (STG): E Pt will create a schedule or routine including possible hobbies, activities, and connection with others to help her make progress to staying out of her room all day.      Intervention/techniques: Informed, Validated/Supported, Listened/Empathized, Observed/Monitored, Clarified, Reinforced, and Provided Feedback    Patient mental status/affect: Calm and Congruent    Patient behavior/appearance: Attentive, Cooperative, and Motivated    Special patient treatment accommodations provided (describe): None    Patient response and progress towards goals: Focus of session was on identifying why questions we may be asking ourselves and how it impacts our emotional well being. I shared with group about how we often cannot answer why questions and we spend a lot of energy and create a lot of emotions trying to answer them. We spoke about some common why questions including why me, why did this happen to me, why am I feeling this way, why am I thinking this?   I shared with group the need to focus on what they need to do to take care of themselves in that moment and move away from spending any more time on the why questions that we ask. I shared that the focus on why is focusing on the source where focusing on what focuses on solutions. Pt listened to  the group discussion and concerns of other members. Pt shared that she often questions herself and her actions regarding her daughter and her death. Pt states she was working a lot and taking care of everyone else but herself. She feels that maybe she missed something or gave her daughter too much independence because she was so smart.  Pt continues to try and work through her grief and not be so hard on herself    Behavioral Health Daily Check In form revealed that

## 2023-05-19 ENCOUNTER — HOSPITAL ENCOUNTER (OUTPATIENT)
Facility: HOSPITAL | Age: 68
Discharge: HOME | End: 2023-05-22
Payer: MEDICARE

## 2023-05-19 PROCEDURE — 90853 GROUP PSYCHOTHERAPY: CPT

## 2023-05-23 ENCOUNTER — HOSPITAL ENCOUNTER (OUTPATIENT)
Facility: HOSPITAL | Age: 68
Discharge: HOME | End: 2023-05-26
Payer: MEDICARE

## 2023-05-23 PROCEDURE — 90853 GROUP PSYCHOTHERAPY: CPT

## 2023-05-23 NOTE — BH NOTE
[facility] Outpatient Program  Group Therapy    Date of Service: 5/23/2023  Start time: 1100  Stop time: 1150  Type of session: Therapy Group    Problem number: 1Dep. F33.1    Short term goal (STG): F Pt will practice skill of radical acceptance and turning her mind (from DBT) to help her to move forward in the direction of acceptance versus denial of emotions and situations.       Intervention/techniques: Informed, Validated/Supported, Listened/Empathized, Observed/Monitored, Clarified, Reinforced, and Provided Feedback    Patient mental status/affect: Congruent    Patient behavior/appearance: Attentive, Cooperative, and Motivated    Special patient treatment accommodations provided (describe): None    Patient response and progress towards goals: Focus of session was based on the handout Everything Is Awful and I'm not Okay.   We spoke about how this handout leads us to ask questions to help us see what we can possibly do to take care of ourselves. We spoke about the questions range from physical needs to emotional and mental needs. We spoke about how this can be useful when we are in crisis and need some simple directions that we may be unable to even think about due to the crisis. We spoke about how group members can use this tool and how they may be willing to use it. Pt participated in the group activity and discussion. She is still feeling depressed and is having trouble motivating herself to engage in activities. She is coming to group consistently even when she doesn't feel like it.   The group was supportive and discussed possible strategies that have movitivated them previously

## 2023-05-23 NOTE — BH NOTE
Appears that patient was initially scheduled to see Dr. Aviles today.  Appt was cancelled - due to public emergency.  Patient reports that someone left her a VM to reschedule from cancellation - kallie is agreeable to V Visit, as she is currently awaiting COVID -19 swab results for possible recent exposure.      States that she would like to discuss worsening GERD s/s.    Writer advised patient on small frequent meals vs spaced larger, should avoid spicy, rich, fatty, or sugary foods or soft drinks, should try to sit straight up for 45-60 minutes after eating.    Appt scheduled for tomorrow.  Patient has no additional questions or concerns.   Advised to contact office with any further questions or concerns.          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 Intervention/techniques: Informed, Validated/Supported, Prompted/Cued, Observed/Monitored, Reinforced, and Provided Feedback    Patient mental status/affect: Calm, Congruent, and Preoccupied    Patient behavior/appearance: Neatly Groomed, Attentive, Cooperative, and Needed Prompting    Special patient treatment accommodations provided (describe): None    Patient response and progress towards goals: Focus of session was based on information from the book Burnout by Nisreen Juárez and Sherlyn Burton. I shared a process from the book about how to identify and work to steadily reach our personal goals. We spoke about asking ourselves the question should I stay, or should I quit____?   We spoke about the quit can be a relationship, job, habit, substance, behavior. We spoke about writing out the process of the benefits and costs of both decisions (staying the same or quitting) and identifying the immediate and longer-term benefits or costs for both paths. I asked group members to think about their decisions and what they would like to think about quitting or accepting the decision to stay the same. Francis Lopez participated in group with prompting. She spoke about how she will continue to work on her thoughts about changes she wants to make. She spoke about how she has noticed some awareness of progress and feeling better and she is aware she needs to work on accepting that set backs and low moods will continue to happen.

## 2023-05-24 ENCOUNTER — HOSPITAL ENCOUNTER (OUTPATIENT)
Facility: HOSPITAL | Age: 68
Discharge: HOME | End: 2023-05-27
Payer: MEDICARE

## 2023-05-24 PROCEDURE — 90853 GROUP PSYCHOTHERAPY: CPT

## 2023-05-25 NOTE — BH NOTE
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       Intervention/techniques: Informed, Validated/Supported, Listened/Empathized, Observed/Monitored, Clarified, and Reinforced    Patient mental status/affect: Calm and Congruent    Patient behavior/appearance: Attentive, Cooperative, and Motivated    Special patient treatment accommodations provided (describe): None    Patient response and progress towards goals: Focus of session was based on the information from the book The Five Languages by Jori Mix. We reviewed the different languages in which we receive love and care and how we feel when we receive our love language. We spoke about the languages are: words of affirmation, acts of service, affection, quality time, and gifts. We spoke about how we can express our language and let those in our life know how we can feel appreciated and cared for. We spoke about how we can identify our love language in order to understand ourselves more. Pt participated in the discussion and activity by reading aloud from the worksheet. She shared that she has been trying to take a baht and change her clothes often . She had gotten in the habit of not doing that after she retired. She feels she lost her motivation but now is trying to be more aware and make an effort to do good things for herself.
[facility] Outpatient Program  Group Therapy        Date of Service: 5/24/2023  Start time: 1000  Stop time: 1050  Type of session: Therapy Group    Problem number: 1Dep. F33.1    Short term goal (STG): H Pt will be able to share with group times she is making excuses to not do something positive for herself and the challenge she is going to put to that excuse      Intervention/techniques: Informed, Validated/Supported, Listened/Empathized, Observed/Monitored, Clarified, Reinforced, and Provided Feedback    Patient mental status/affect: Congruent    Patient behavior/appearance: Attentive, Cooperative, and Motivated    Special patient treatment accommodations provided (describe): None    Patient response and progress towards goals: Focus of session was based on the handout Everything Is Awful and I'm not Okay.   We spoke about how this handout leads us to ask questions to help us see what we can possibly do to take care of ourselves. We spoke about the questions range from physical needs to emotional and mental needs. We spoke about how this can be useful when we are in crisis and need some simple directions that we may be unable to even think about due to the crisis. We spoke about how group members can use this tool and how they may be willing to use it. Pt shared that she didn't have plans for the holiday but that there was an event in her neighborhood. She was trying to decide whether to go as she thought many of the people would be older than her. We discussed the pros and cons and she also stated that she didn't feel like much in her life had changed since she moved here. [facility] but isn't sure that is the right answer either. She stated she would think about going this weekend and realizes she can always leave.         Behavioral Health Daily Check In form revealed that pt is not experiencing SI/HI thoughts or plans, remaining abstinent from drugs and alcohol, and
[facility] Outpatient Program  Group Therapy    Date of Service: 5/24/2023  Start time: 1100  Stop time: 1150  Type of session: Therapy Group    Problem number: 1. Dep F 33.1    Short term goal (STG): DARON Dobbs will decide on a healthy system of reward for her to utilize as a way to give herself encouragement for managing impulses and behaviors and implement that system    Intervention/techniques: Informed, Prompted/Cued, Listened/Empathized, Promoted Peer Support, and Provided Feedback    Patient mental status/affect: Calm, Congruent, and Preoccupied    Patient behavior/appearance: Neatly Groomed, Attentive, Cooperative, and Needed Prompting    Special patient treatment accommodations provided (describe): None    Patient response and progress towards goals: Focus of session was a discussion on what are essentially three stages of treatment/therapy. I discussed with group that the first stage is typically developing insight and awareness about symptoms, reactions and behaviors that we engage in, what triggers us, how we may be vulnerable to triggers and emotions, and looking inside to see how we impact our day to day life in negative and positive ways. We spoke about the 2nd phase is typically gaining knowledge essentially about how to do things in positive ways, how we can change our reactions, behaviors, and set goals for change and how to go about it. Third phase is typically implementation of coping skills and creating positive changes. I spoke with group about how many people get stuck in entering the third phase of doing the work to change and why and we discussed where group members believe that they are and assisted in finding ways forward in their progress of treatment. Rufina Martins participated in group with prompting. She was easy to engage and talked a lot once engaged.   She spoke about how she knows she is aware that she is on her
direction of acceptance versus denial of emotions and situations.   By 6/22/2023. G     C Pt will continue to work on building up her self-worth as she has been struggling with acceptance of herself and to work on building on her strengths. Pt will identify how she can build on her self-worth and acceptance in order to prevent future setbacks.  By 6/22/2023. H C Pt is struggling with making progress towards this goal.  We will continue to help her see how to challenge her excuses. Pt will be able to share with group times she is making excuses to not do something positive for herself and the challenge she is going to put to that excuse.   By 6/18/2023. I M Pt is showing good progress towards this goal.  We will meet it at this time. PROBLEM  NUMBER: 1 PROBLEM: Depression   STG Goal Status Comments (Progress or Lack of Progress)       ODILIA SPENCE We will continue to work out ideas about what can work for her as rewards for her positive choices. Pt will decide on a healthy system of reward for her to utilize as a way to give herself encouragement for managing impulses and behaviors and implement that system.  By 6/27/2023. K N    Pt will identify what skills she is not using because she believes they are too hard to implement and allow help in challenging those beliefs.   By 6/25/2023. L    N Pt will be able to ask herself when she notices she is struggling 'is this focus or what I am doing helping me or hurting me' and work to change focus if necessary.    By 6/27/2023. M N Pt will articulate and or put in writing a plan of action for coping with uncomfortable feelings that get unbalanced for her to help gain confidence for discharge planning.   By 6/25/2023.          []- Check ONLY if Problem/Goal Progress Comments  Continued on Another Page

## 2023-05-26 RX ORDER — HYDROCHLOROTHIAZIDE 12.5 MG/1
TABLET ORAL
COMMUNITY
Start: 2023-05-05

## 2023-05-26 RX ORDER — IBUPROFEN 800 MG/1
800 TABLET ORAL
COMMUNITY

## 2023-05-26 RX ORDER — ASPIRIN 81 MG/1
81 TABLET, CHEWABLE ORAL DAILY
COMMUNITY

## 2023-05-26 RX ORDER — LEVOTHYROXINE SODIUM 175 UG/1
175 TABLET ORAL
COMMUNITY
Start: 2022-11-03 | End: 2023-06-16 | Stop reason: DRUGHIGH

## 2023-05-26 RX ORDER — CITALOPRAM HYDROBROMIDE 10 MG/1
10 TABLET ORAL DAILY
COMMUNITY
Start: 2023-04-05 | End: 2023-06-06 | Stop reason: HOSPADM

## 2023-05-26 RX ORDER — LISINOPRIL 20 MG/1
20 TABLET ORAL DAILY
COMMUNITY
Start: 2022-11-01

## 2023-05-26 RX ORDER — CHOLECALCIFEROL (VITAMIN D3) 125 MCG
CAPSULE ORAL
COMMUNITY

## 2023-05-30 ENCOUNTER — HOSPITAL ENCOUNTER (OUTPATIENT)
Facility: HOSPITAL | Age: 68
Discharge: HOME | End: 2023-06-02
Payer: MEDICARE

## 2023-05-30 PROCEDURE — 90853 GROUP PSYCHOTHERAPY: CPT

## 2023-05-30 NOTE — BH NOTE
Group Therapy Note    Date: 5/30/2023    Group Start Time: 12:00 PM  Group End Time: 12:50 PM  Group Topic: Process Group - Outpatient    [address] [facility]    [facility]        [facility] Note    Focus of session was based on information from the book Rhythms of Renewal by [employer]. We spoke about the concept of if we are a natural risk taker versus someone who plays it safe. We spoke about the growth that can occur when we take chances and try new things versus the consequences of staying stuck in ruts or always playing it safe. We discussed how patient may be naturally in terms of either a risk taker or not and what are healthy risks versus unhealthy ones. We spoke about how we must take reasonable chances in order to grow but that we have to take those chances when our minds are in a healthy place with a balance of emotions and rational thought. Patient's Goal:  1. Dep F 33.1  H.  Pt will be able to share with group times she is making excuses to not do something positive for herself and the challenge she is going to put to that excuse.     Notes: Elli Rose participated in group with prompting. She was quiet but she could be engaged with questions. She spoke about how she used to be a risk taker but has found she has been more withdrawn and careful as she has gotten older. We spoke about the need to find a better balance. Status After Intervention:  Unchanged    Participation Level:  Active Listener and Minimal    Participation Quality: Appropriate and Attentive      Speech:  normal      Thought Process/Content: Logical      Affective Functioning: Congruent      Mood: depressed      Level of consciousness:  Alert, Oriented x4, and Attentive      Response to Learning: Able to verbalize current knowledge/experience and Capable of insight      Endings: None Reported    Modes of Intervention: Education,

## 2023-05-30 NOTE — BH NOTE
Group Therapy Note    Date: 5/30/2023    Group Start Time: 10:00 AM  Group End Time: 10:50 AM  Group Topic: Process Group - Outpatient    C/ Kavin Castro 79, LCSW        Group Therapy Note      Focus of session was a review of the weekend and helping group members see their wins that they can celebrate from the past several days. We spoke about the things that occurred and choices that they had in front of them and helping everyone to see the positive impact that they had on their well being. We also spoke about what may have been the setbacks and how they coped and what they can plan to do for the future to help build on relapse prevention. Behavioral Health Daily Check In form revealed that patient is not experiencing SI/HI thoughts or plans, remaining abstinent from drugs and alcohol, and report's goal today of   \"keep trying to move ahead and finding some part time work. \"         Patient's Goal:  1. Dep F 33.1  D. Pt will report practicing journaling at least several times a week, primarily developing a davi Berrios in which she writes down thoughts and validates them as true or works to challenge them    Notes: Cecilia Lynne participated in group with prompting. She spoke about how she had a down weekend but the weather with all the rain was a factor. She spoke about how she is struggling with her motivation and getting started. She did \"my usual of playing a lot of games on my phone. \"      Status After Intervention:  Unchanged    Participation Level:  Active Listener and Minimal    Participation Quality: Appropriate and Attentive      Speech:  normal      Thought Process/Content: Logical      Affective Functioning: Congruent and Flat      Mood: depressed      Level of consciousness:  Alert, Oriented x4, and Attentive      Response to Learning: Able to verbalize current knowledge/experience and Able to retain

## 2023-05-30 NOTE — BH NOTE
Group Therapy Note    Date: 5/30/2023    Group Start Time: 11:00 AM  Group End Time: 11:50 AM  Group Topic: Process Group - Outpatient    [address]        Group Therapy Note  Focus of session was based on handout from [employer] on exploring our personal strengths. We spoke about how this is a good strategy when we are dealing with a lot of critical self-talk and negative self-esteem. We discussed how this can help us to see what our strengths are and how we can use them in relationships and in our daily life. Group members were encouraged to see how they can use their strengths in new ways and help them see their potential to help them build up more positive self-talk and also improve their day to day functioning       Patient's Goal:  1Dep. F33.1  E We will continue to process ideas for outlets and routines that can help her at this time. Pt will create a schedule or routine including possible hobbies, activities, and connection with others to help her make progress to staying out of her room all day    Notes:  Pt participated in the group activity and discussion. Pt shared that she feels her strength is now patience and flexibility. These are strengths that she feels she did not have until retiring. She is experiencing  Changes and she tried to continue to [facility] with the flow\" more. However she stated that she needs to work toward having more self control. Status After Intervention:  Improved    Participation Level:  Active Listener and Interactive    Participation Quality: Appropriate      Speech:  normal      Thought Process/Content: Logical      Affective Functioning: Congruent      Mood: Brighter      Level of consciousness:  Oriented x4      Response to Learning: Able to verbalize current knowledge/experience, Able to verbalize/acknowledge new learning, Able to retain information, Capable of

## 2023-05-31 ENCOUNTER — HOSPITAL ENCOUNTER (OUTPATIENT)
Facility: HOSPITAL | Age: 68
Discharge: HOME | End: 2023-06-03
Payer: MEDICARE

## 2023-05-31 NOTE — BH NOTE
Group Therapy Note    Date: 5/31/2023    Group Start Time: 12:00 PM  Group End Time: 12:50 PM  Group Topic: Psychotherapy    [facility]    Navya Hoyt LCSW        Group Therapy Note      Focus of session was on Self Respect Essentials. We spoke about why self-respect is so important to recovery in mental health and substance abuse. We discussed the ideas of having a sense of strengths and weaknesses, that we are entitled to be treated with respect, acceptance of self, sense of self is strong enough to withstand bumps and bruises, that we can feel worthwhile even when we don't know something, and that we don't bolster our self-respect at the expense of others. Group members were asked to share their thoughts and feelings about this and how they can increase their ability to respect themselves. Patient's Goal:  1Dep. F33.1  F .  Pt will practice skill of radical acceptance and turning her mind (from DBT) to help her to move forward in the direction of acceptance versus denial of emotions and situations.       Notes:  Pt participated in the group activity and engaged with the other members. She read aloud from the worksheet discussing self respect essentials. She shared how she feels it is important to respect yourself and others. She is developing a different type of respect for herself now that her life has changed recently and she continues to learn more about herself. Status After Intervention:  Improved    Participation Level:  Active Listener and Interactive    Participation Quality: Appropriate, Attentive, Sharing, and Supportive      Speech:  normal      Thought Process/Content: Logical      Affective Functioning: Congruent      Mood: Bright      Level of consciousness:  Alert, Oriented x4, and Attentive      Response to Learning: Able to verbalize current knowledge/experience, Able to verbalize/acknowledge new

## 2023-05-31 NOTE — BH NOTE
Group Therapy Note    Date: 5/31/2023    Group Start Time: 10:00 AM  Group End Time: 10:50 AM  Group Topic: Psychotherapy    [address] OP    [facility]        [facility] Note    Focus of session was on the different kinds of problems that we face and that our reactions to our real problems often create other problems as well. I discussed with group the concept of types of problems that is based from Rational Living Therapy and that they are practical, emotional, and imagined. We spoke about how practical problems are where many emotions begin and we discussed our oftentimes intense feelings about our problems and they lead us to feel anxious, depressed, worried and miserable. We discussed the idea of having a practical problem but working towards leaving the emotions out of it and accept it with calmness. We also spoke about imagined problems and that we can believe that we have more of a problem than we really do and how can we work towards putting problems into perspective. Behavioral Health Daily Check In form revealed that patient is not experiencing SI/HI thoughts or plans, remaining abstinent from drugs and alcohol, and report's goal today of   \"try to get stronger about finding part time work. \"         Patient's Goal:  1. Dep F 33.1 M  Pt will articulate and or put in writing a plan of action for coping with uncomfortable feelings that get unbalanced for her to help gain confidence for discharge planning    Notes: Nanod Mcgregor was quiet in group but she was supportive of peers. Status After Intervention:  Unchanged    Participation Level:  Active Listener and Interactive    Participation Quality: Appropriate      Speech:  normal      Thought Process/Content: Logical      Affective Functioning: Congruent      Mood: anxious and depressed      Level of consciousness:  Alert, Oriented x4, and

## 2023-05-31 NOTE — BH NOTE
Group Therapy Note    Date: 5/31/2023    Group Start Time: 11:00 AM  Group End Time: 11:50 AM  Group Topic: Psychotherapy    [address] [facility]    Ronal Maharaj LCSW        Group Therapy Note    Focus of session was based on concept of being addicted to thoughts.   We spoke about the consequences of negative thinking patterns and then on top of that, believing everything we think. I shared ideas of how we can recognize having an addiction to thoughts such as going down a rabbit hole trail of thought often, you take everything that crosses your mind seriously, you think about life more than you live it, you don't know how to ignore unhelpful or destructive thoughts. We spoke about some strategies to incorporate to help with this issue such as setting aside time to think and REFLECT, start to work to tell the difference between thoughts and feelings, and not spending time figuring out what you want and how to go towards it. Patient's Goal:  1Dep. F33.1 G Pt will identify how she can build on her self-worth and acceptance in order to prevent future setbacks.      Notes:  Pt listened tot he group discussion and concerns of other members. She attempted to be supportive and offer strategies based on her own experiences.   She followed along as we reviewed thinking patterns and how they effect our mood and behaviors    Status After Intervention:  Improved    Participation Level: Interactive    Participation Quality: Appropriate, Attentive, Sharing, and Supportive      Speech:  normal      Thought Process/Content: Logical      Affective Functioning: Congruent      Mood: Bright      Level of consciousness:  Alert, Oriented x4, and Attentive      Response to Learning: Able to verbalize current knowledge/experience, Able to verbalize/acknowledge new learning, Able to retain information, and Capable of insight      Endings: None

## 2023-06-02 ENCOUNTER — HOSPITAL ENCOUNTER (OUTPATIENT)
Facility: HOSPITAL | Age: 68
End: 2023-06-02
Payer: MEDICARE

## 2023-06-02 NOTE — BH NOTE
Group Therapy Note    Date: 6/2/2023      Pt called and stated she had to give her son the car and would not be able to attend her scheduled treatment day

## 2023-06-06 ENCOUNTER — APPOINTMENT (OUTPATIENT)
Facility: HOSPITAL | Age: 68
End: 2023-06-06
Payer: MEDICARE

## 2023-06-06 ENCOUNTER — HOSPITAL ENCOUNTER (OUTPATIENT)
Facility: HOSPITAL | Age: 68
Discharge: HOME | End: 2023-06-09
Payer: MEDICARE

## 2023-06-06 PROCEDURE — 90832 PSYTX W PT 30 MINUTES: CPT

## 2023-06-06 PROCEDURE — 99214 OFFICE O/P EST MOD 30 MIN: CPT | Performed by: PSYCHIATRY & NEUROLOGY

## 2023-06-06 PROCEDURE — 90853 GROUP PSYCHOTHERAPY: CPT

## 2023-06-06 RX ORDER — MIRTAZAPINE 15 MG/1
15 TABLET, FILM COATED ORAL NIGHTLY
Qty: 30 TABLET | Refills: 2 | Status: SHIPPED | OUTPATIENT
Start: 2023-06-06

## 2023-06-06 NOTE — BH NOTE
Intensive Outpatient Behavioral Health  Psychotherapy Note              General Information    * If patient is absent, state reason for absence, staff intervention, and staff signature. Psychotherapy Session    Start time: 1000  Stop time: 1030    Problem number: 1Dep. F33.1  Short term goal (STP): H Pt will be able to share with group times she is making excuses to not do something positive for herself and the challenge she is going to put to that excuse.       Patient Mental Status and Mood/Affect:Depressed, Tearful, Anxious, and Worried    Patient Behavior and Appearance: Neatly Groomed, Attentive, Cooperative, and Motivated    Intervention/Techniques: Informed, Validated/Supported, Guided, Listened/Empathized, Observed/Monitored, Clarified, Reinforced, and Provided Feedback    Focus of Session/Patient Response and Progress Towards Goal: I met with pt today to discuss with her the recent death of one of the group members. Pt has experienced a lot of grief related to family members death with in the last couple of years. Pt had talked with this member last week and had not attended group since. Pt was very upset and stated she was in shock. She referenced her attempts at working on her grief lately and stated that this is bringing back a lot of memories and feelings for her. Pt stated at times she ask herself what is the point of living with all the bad things happening. She stated she would not commit suicide or act upon her feelings as it goes against her [employer] beliefs and she needs to live to help her son get better.  We continued to discussed her thoughts and feeling then she wanted to take a break before deciding if she would go to groups  Pt chose to stay for the remaining groups and also consulted with Dr. Mitesh Minaya

## 2023-06-06 NOTE — PROGRESS NOTES
SOP PROGRESS NOTE    INTERVAL HISTORY/FINDINGS:  States she's \"not doing too well\" lately, and that another patient's death has affected her hard--brings back the deaths of her daughter (2018) and partner (2019). She's also stressed by all the bad news she hears daily, and I instructed her to try to go a week without watching any news, and see if she feels better. Was unable to tolerate the Celexa after only 1 dose. Discussed Rx options and she's willing to try a low dose of Remeron--has had bad SE's with several SSRI's and was hypomanic with Wellbutrin, and Trintellix was ineffective. No SI at all, but her N/V functioning has been moderately impaired--crying often, energy is low, etc.     MEDICATIONS:  Current Outpatient Medications   Medication Sig    aspirin 81 MG chewable tablet Take 1 tablet by mouth daily    vitamin D (CHOLECALCIFEROL) 25 MCG (1000 UT) TABS tablet Take by mouth daily    citalopram (CELEXA) 10 MG tablet Take 1 tablet by mouth daily    hydroCHLOROthiazide (HYDRODIURIL) 12.5 MG tablet TAKE 1 TABLET BY MOUTH EVERY DAY FOR BLOOD PRESSURE    ibuprofen (ADVIL;MOTRIN) 800 MG tablet Take 1 tablet by mouth    levothyroxine (SYNTHROID) 175 MCG tablet Take 1 tablet by mouth every morning (before breakfast)    lisinopril (PRINIVIL;ZESTRIL) 20 MG tablet Take 1 tablet by mouth daily    melatonin 5 MG TABS tablet Take by mouth     No current facility-administered medications for this encounter.        MENTAL STATUS UPDATE: (Positives in [place])  Appearance:   Neat   Disheveled  Odiferous   Appropriate  Orientation:   Time  Place  Person  Speech:   Coherent  Pressured--very slight  Garbled/Slurred  Loud   Soft  Mute  Memory:   Intact  Impaired (Recent  Remote)--Mild  Severe  Mood:   Euthymic  Depressed  Anxious  Elated  Affect:    Appropriate  Inappropriate  Labile  Blunted  Flat  Thought Content:   Logical/Appropriate  Paranoid  Delusional  Illogical IOR  SI  HI  Thought Process:   Coherent/linear  Tangential

## 2023-06-06 NOTE — BH NOTE
Group Therapy Note    Date: 6/6/2023    Group Start Time: 11:00 AM  Group End Time: 11:50 AM  Group Topic: Psychotherapy    [address]        Group Therapy Note    Focus of session was based on handout called 39 Marianne Gandara for Revitalizing and Energizing Yourself.   We reviewed the list of coping skills that people can use to do the following: develop self-understanding, improve your mind, develop healthy attitudes, control your emotions, str  engthen your body, improve personal relationships, lift your spirit, improve your environment, and live a healthy lifestyle. We spoke about which coping skills they are using successfully and which ones they can see will benefit from using and how they can get started. Patient's Goal:  1Dep 33.1 D Pt will report practicing journaling at least several times a week, primarily developing a thoughts Jaz Clarke in which she writes down thoughts and validates them as true or works to challenge them.       Notes:  Pt participated in the group discussion and activity. She shared that she has applied for a job at [employer] but has not heard anything back  . She is unsure if she wants to go back to work but is trying strategies to help her figure that out. She had a good weekend with her son and feels that he is doing better. She stated that she would like to think about making a list of goals and dreams but is unsure at 77 what those things would be. She is beginning to like the slower pace of where she lives now and feels that is a good change and start for her    Status After Intervention:  Improved    Participation Level:  Active Listener and Interactive    Participation Quality: Appropriate, Attentive, Sharing, and Supportive      Speech:  normal      Thought Process/Content: Logical      Affective Functioning: Congruent      Mood: anxious      Level of consciousness:  Alert,

## 2023-06-06 NOTE — BH NOTE
Group Therapy Note    Date: 6/6/2023    Group Start Time: 12:00 PM  Group End Time: 12:50 PM  Group Topic: Psychotherapy    [address] OP    [facility]        Group Therapy Note    Focus of session was based on information from Jimi Newman [facility] about what Boundaries with Yourself Look Like.   We discussed how boundaries are rules that we want to set for ourselves in order to have success to reach our goals. We spoke about some basics like having a bedtime, limiting screen time, having an amount of time each week dedicated to exercise, keeping a budget, saying no to yourself, and limiting what we consider treats and rewards such as a drink or a unhealthy meal.  We spoke about how patient thinks a rule or boundary for themselves could help and what it can be. Behavioral Health Daily Check In form revealed that patient is not experiencing SI/HI thoughts or plans, remaining abstinent from drugs and alcohol, and report's goal today of  \"start taking new medicine today and making an appointment with my PCP as well. \"     Patient's Goal:  1. Dep F 33.1  L. Pt will be able to ask herself when she notices she is struggling 'is this focus or what I am doing helping me or hurting me' and work to change focus if necessary    Notes: Angelina Dominique participated well in group and she spoke about how she knows that she needs to work on [place]es. \"  She spoke about how she knows that \"I watch it too much and I keep trying to prepare myself for bad news but I can see that it is doing me damage. \"  She spoke about how she has been doing better in a lot of ways but \"I go right back to these unhealthy habits and I see I can cause myself harm as a result. \"      Status After Intervention:  Improved    Participation Level:  Active Listener and Interactive    Participation Quality: Appropriate, Attentive, Sharing, and Supportive      Speech:

## 2023-06-07 ENCOUNTER — HOSPITAL ENCOUNTER (OUTPATIENT)
Facility: HOSPITAL | Age: 68
Discharge: HOME | End: 2023-06-10
Payer: MEDICARE

## 2023-06-07 PROCEDURE — 90853 GROUP PSYCHOTHERAPY: CPT

## 2023-06-07 NOTE — BH NOTE
Group Therapy Note    Date: 2023    Group Start Time: 12:00 PM  Group End Time: 12:50 PM  Group Topic: Psychotherapy    [id number] , [facility]        Group Therapy Note  Focus of session was based on handout called 39 Marianne Gandara for Revitalizing and Energizing Yourself.   We reviewed the list of coping skills that people can use to do the following: develop self-understanding, improve your mind, develop healthy attitudes, control your emotions, str  engthen your body, improve personal relationships, lift your spirit, improve your environment, and live a healthy lifestyle. We spoke about which coping skills they are using successfully and which ones they can see will benefit from using and how they can get started. Patient's Goal:  1Dep. F33.1 J Pt will decide on a healthy system of reward for her to utilize as a way to give herself encouragement for managing impulses and behaviors and implement that system.      Notes:  ***    Status After Intervention:  {Status After Intervention:631022228}    Participation Level: {Participation Level:754254317}    Participation Quality: {[facility] PARTICIPATION QUALITY:699716019}      Speech:  {ED  CD_SPEECH:55133}      Thought Process/Content: {Thought Process/Content:302868970}      Affective Functioning: {Affective Functionin}      Mood: {Mood:961040200}      Level of consciousness:  {Level of consciousness:228322389}      Response to Learning: {[facility] Responses to Learnin}      Endings: {[facility] Endings:05984}    Modes of Intervention: {[facility] Modes of Intervention:591943729}      Discipline Responsible: {[facility] Multidisciplinary:323701583}      Signature:  Geronimo Aguayo LCSW
Group Therapy Note    Date: 6/7/2023    Group Start Time: 12:00 PM  Group End Time: 12:50 PM  Group Topic: Psychotherapy    [facility] BEHAVIORAL TH OP    Abi Medellin, [facility]        Group Therapy Note       Focus of session was focused on the idea of how to [employer] our way to some internal or external change we want to happen in our life. I asked group members to think about a change they would like to see for themselves in their life by the end of this year or this time next year. We spoke about the concept of reverse engineering something and how that can lead to a starting point for today. We spoke about how it can be helpful to think about things in this way to picture ourselves there first and then make a decision as to where to start today. Patient's Goal:  1. Dep F 33.1 M.  Pt will articulate and or put in writing a plan of action for coping with uncomfortable feelings that get unbalanced for her to help gain confidence for discharge planning.       Notes: Rj Molina participated in group with prompting. She spoke about how she wants to be working in some capacity next year. She has come to like the area here and does not want to go back to [place]. She is expressing motivation to work towards her goals and put in place a job for both her self esteem and to have extra income. Status After Intervention:  Improved    Participation Level:  Active Listener and Interactive    Participation Quality: Appropriate, Attentive, Sharing, and Supportive      Speech:  normal      Thought Process/Content: Logical      Affective Functioning: Congruent      Mood: depressed      Level of consciousness:  Alert, Oriented x4, and Attentive      Response to Learning: Able to verbalize current knowledge/experience, Able to retain information, Capable of insight, Able to change behavior, and Progressing to goal      Endings: None
Logical      Affective Functioning: Congruent      Mood: depressed      Level of consciousness:  Alert, Oriented x4, and Attentive      Response to Learning: Able to verbalize current knowledge/experience, Capable of insight, Able to change behavior, and Progressing to goal      Endings: None Reported    Modes of Intervention: Education, Support, Socialization, Problem-solving, and Reality-testing      Discipline Responsible: /Counselor      Signature:   Erika Medellin LCSW
Multidisciplinary:[id number]}      Signature:  Elli Cordova LCSW
None Reported    Modes of Intervention: Education, Support, Socialization, and Problem-solving      Discipline Responsible: /Counselor      Signature:   Meek Medellin LCSW

## 2023-06-16 ENCOUNTER — HOSPITAL ENCOUNTER (OUTPATIENT)
Facility: HOSPITAL | Age: 68
Discharge: HOME | End: 2023-06-19
Payer: MEDICARE

## 2023-06-17 PROBLEM — C50.411 MALIGNANT NEOPLASM OF UPPER-OUTER QUADRANT OF RIGHT FEMALE BREAST, UNSPECIFIED ESTROGEN RECEPTOR STATUS (HCC): Status: ACTIVE | Noted: 2023-06-17

## 2023-06-20 ENCOUNTER — HOSPITAL ENCOUNTER (OUTPATIENT)
Facility: HOSPITAL | Age: 68
Discharge: HOME | End: 2023-06-23
Payer: MEDICARE

## 2023-06-20 PROCEDURE — 90853 GROUP PSYCHOTHERAPY: CPT

## 2023-06-20 NOTE — BH NOTE
Group Therapy Note    Date: 6/20/2023    Group Start Time: 12:00 PM  Group End Time: 12:50 PM  Group Topic: Psychotherapy    [address] OP    Iraj Gray, SHELLI        Group Therapy Note    Focus of session was based on the article 10 Signs you are Starting to Heal.  We spoke about the following: you no longer dwell on negative thoughts from others, you no longer feel afraid to ask for help, you are starting to feel again, you no longer beat yourself up about everything, you experience more better days that bad ones, you are gaining more control over your life, you are regaining your appetite, you no longer rely on others to make you happy, and you look forward to the future. We spoke about where group members are and what they are noticing are positive signs of recovery. Patient's Goal:  1Dep. F33.1 G Pt will identify how she can build on her self-worth and acceptance in order to prevent future setbacks    Notes:  Pt participated in the group activity and engaged with the other members. At times pt comes across strongly with her views regarding herself and what she thinks others should do. We continue to work on boundaries in sharing and allowing others to also share how they feel about their situation and let them work through their feelings     Status After Intervention:  Improved    Participation Level:  Active Listener and Interactive    Participation Quality: Appropriate, Attentive, Sharing, and Supportive      Speech:  normal      Thought Process/Content: Logical      Affective Functioning: Congruent      Mood: elevated      Level of consciousness:  Alert, Oriented x4, and Attentive      Response to Learning: Able to verbalize current knowledge/experience      Endings: None Reported    Modes of Intervention: Education, Support, Clarifying, and Limit-setting      Discipline Responsible: Social

## 2023-06-20 NOTE — BH NOTE
Group Therapy Note    Date: 6/20/2023    Group Start Time: 11:00 AM  Group End Time: 11:50 AM  Group Topic: Psychotherapy    [facility] BEHAVIORAL [facility] OP    [facility]bi Medellin, [facility]        Group Therapy Note  Focus of session was on the struggle with trying to control people and things in our life that we cannot control. We spoke about how our focus on others may often be an excuse to not take care of ourselves. We tend to also ignore or avoid what is plainly in our control that can be fixed. We spoke about some red flag questions to ask ourselves to help us see if we are focusing on things we can change which include  being unable to say no, felt responsible for someone else's misfortune or problem, a need to find solutions for someone else's problems, and become easily upset if someone is upset around us. Patient's Goal:  1. Dep F 33.1 E.  Pt will create a schedule or routine including possible hobbies, activities, and connection with others to help her make progress to staying out of her room all day.     Notes: Yue Shook participated in group with prompting. She spoke about her past a great deal as she was recently thinking about it when she visited with her brother this weekend. She shared some experiences that she had and how they shaped her. She spoke about how she is still taking the medication but is now concerned about an increased appetite. She was encouraged to think about what else she can do to manage her weight other than deciding to stop the medication. She does report she thinks it is helping as she is managing her emotions better overall. Status After Intervention:  Unchanged    Participation Level:  Active Listener and Interactive    Participation Quality: Appropriate, Attentive, Sharing, and Supportive      Speech:  normal      Thought Process/Content: Logical      Affective Functioning: Congruent      Mood:

## 2023-06-20 NOTE — BH NOTE
Group Therapy Note    Date: 6/20/2023    Group Start Time: 10:00 AM  Group End Time: 10:50 AM  Group Topic: Psychotherapy    [address] BEHAVIORAL HLTH OP    Chilo Dotson LCSW        Group Therapy Note  Focus of session was based on information from the book The Brain Fog Fix by Dr. Eden Alfaro. We spoke about what brain fog can feel like and what it can lead to which includes increased anxiety and depression as well as problems with memory and functioning. I focused on the information that book provides about how we eat, sleep, work, and live and how our choices disrupt our chemical levels of dopamine, serotonin, and cortisol and that unstable levels of these chemicals lead to brain fog.   We discussed what our brain needs and what fogs our brain that is clearly laid out in the book including diet, exercise, use of brain altering chemicals, and exposure to screens. Behavioral Health Daily Check In form revealed that pt is not experiencing SI/HI thoughts or plans, uses medical marijuana  and reports goal today to stay on medicine and be more active     Patient's Goal:  1Dep. F33.1 M Pt will articulate and or put in writing a plan of action for coping with uncomfortable feelings that get unbalanced for her to help gain confidence for discharge planning.       Notes:  Pt shared with the group that she has been experiencing different emotions after her son found a letter that her daughter had written to her boyfriend/supposed drug supplier. Pt is feeling more anger toward this oleg and is trying to deal with it as she can't see him. She feels guilt as she feels she should have been more aware, and sadness related to her loss  The group was supportive and also discussed possible cooping strategies. Status After Intervention:  Improved    Participation Level:  Active Listener and Interactive    Participation Quality: Appropriate, Attentive,

## 2023-06-21 ENCOUNTER — HOSPITAL ENCOUNTER (OUTPATIENT)
Facility: HOSPITAL | Age: 68
Discharge: HOME | End: 2023-06-24
Payer: MEDICARE

## 2023-06-21 PROCEDURE — 90853 GROUP PSYCHOTHERAPY: CPT

## 2023-06-21 NOTE — BH NOTE
Group Therapy Note    Date: 6/21/2023    Group Start Time: 10:00 AM  Group End Time: 10:50 AM  Group Topic: Psychotherapy    [address] BE[facility]AVIORAL HL[facility] OP    Samara Peters LCSW        Group Therapy Note    Focus of session was based on information from the book The Brain Fog Fix by Dr. Apolinar Herbert. We spoke about what brain fog can feel like and what it can lead to which includes increased anxiety and depression as well as problems with memory and functioning. I focused on the information that book provides about how we eat, sleep, work, and live and how our choices disrupt our chemical levels of dopamine, serotonin, and cortisol and that unstable levels of these chemicals lead to brain fog.   We discussed what our brain needs and what fogs our brain that is clearly laid out in the book including diet, exercise, use of brain altering chemicals, and exposure to screens     Behavioral Health Daily Check In form revealed that pt is not experiencing SI/HI thoughts or plans, remaining abstinent from drugs and alcohol, and reports goal today of  keep taking medication     Patient's Goal:  1Dep. F33.1 D Pt will report practicing journaling at least several times a week, primarily developing a thoughts Bernette Battles in which she writes down thoughts and validates them as true or works to challenge them.       Notes:  Pt participated in the discussion and engaged with the other members. Pt shared that she is beginning to consider a possible move . Her ceiling is leaking and the sink needs to be fixed in the house she rents from her brother. She is unsure whether he will fix it so she is starting to consider her options. She does not feel they have completed the healing process yet that they came here to do yet but would consider going back to [facility]. if needed. Status After Intervention:  Improved    Participation Level:  Active Listener and

## 2023-06-21 NOTE — BH NOTE
Group Therapy Note    Date: 6/21/2023    Group Start Time: 11:50  Group End Time: 11:00  Group Topic: Psychotherapy    [facility]    Chilo Dotson LCSW        Group Therapy Note    Focus of session was based on the article 10 Signs you are Starting to Heal.  We spoke about the following: you no longer dwell on negative thoughts from others, you no longer feel afraid to ask for help, you are starting to feel again, you no longer beat yourself up about everything, you experience more better days that bad ones, you are gaining more control over your life, you are regaining your appetite, you no longer rely on others to make you happy, and you look forward to the future. We spoke about where group members are and what they are noticing are positive signs of recovery. Patient's Goal:  1Dep. F33.1 D Pt will report practicing journaling at least several times a week, primarily developing a thoughts Gara Appl in which she writes down thoughts and validates them as true or works to challenge them.       Notes:  Pt participated in the group activity and followed along with the activity questions. Pt shared that she thinks she is starting to \"feel\" again. She was hesitant at first to take medicine but feels it is helping her at this point.   She has also enjoyed coming to group and knows she has grown and learned about herself through this process    Status After Intervention:  Improved    Participation Level: Interactive    Participation Quality: Appropriate, Attentive, Sharing, and Supportive      Speech:  normal      Thought Process/Content: Logical      Affective Functioning: Congruent      Mood: Bright      Level of consciousness:  Alert, Oriented x4, and Attentive      Response to Learning: Able to verbalize current knowledge/experience, Able to verbalize/acknowledge new learning, Able to retain information, Capable of

## 2023-06-23 ENCOUNTER — HOSPITAL ENCOUNTER (OUTPATIENT)
Facility: HOSPITAL | Age: 68
Discharge: HOME | End: 2023-06-26
Payer: MEDICARE

## 2023-06-23 PROCEDURE — 90853 GROUP PSYCHOTHERAPY: CPT

## 2023-06-27 ENCOUNTER — HOSPITAL ENCOUNTER (OUTPATIENT)
Facility: HOSPITAL | Age: 68
Discharge: HOME | End: 2023-06-30
Payer: MEDICARE

## 2023-06-27 PROCEDURE — 90853 GROUP PSYCHOTHERAPY: CPT

## 2023-06-29 RX ORDER — MIRTAZAPINE 15 MG/1
15 TABLET, FILM COATED ORAL NIGHTLY
Qty: 90 TABLET | Refills: 1 | Status: SHIPPED | OUTPATIENT
Start: 2023-06-29

## 2023-06-30 ENCOUNTER — HOSPITAL ENCOUNTER (OUTPATIENT)
Facility: HOSPITAL | Age: 68
End: 2023-06-30
Payer: MEDICARE

## 2023-06-30 PROCEDURE — 90853 GROUP PSYCHOTHERAPY: CPT

## 2023-07-05 ENCOUNTER — HOSPITAL ENCOUNTER (OUTPATIENT)
Facility: HOSPITAL | Age: 68
Discharge: HOME | End: 2023-07-08
Payer: MEDICARE

## 2023-07-05 PROCEDURE — 90853 GROUP PSYCHOTHERAPY: CPT

## 2023-07-06 ENCOUNTER — HOSPITAL ENCOUNTER (OUTPATIENT)
Facility: HOSPITAL | Age: 68
End: 2023-07-06
Payer: MEDICARE

## 2023-07-06 PROCEDURE — 90853 GROUP PSYCHOTHERAPY: CPT

## 2023-07-06 NOTE — BH NOTE
Group Therapy Note    Date: 7/6/2023    Group Start Time: 10:00 AM  Group End Time: 10:50 AM  Group Topic: Psychotherapy    [address] [facility] OP    Abi Medellin, [facility]        Group Therapy Note    Focus of session was on information from Dr. Florinda Manuel on urge surfing and how to use mindfulness to help cope with impulses and urges. I shared with group about how urges are like waves and that urges usually peak about 20 to 30 minutes if we let them. If we resist them, then we tend to worsen them and they also last longer. I shared with group about how to surf an urge by practicing patience and acceptance and to practice breathing and to even visualize the wave to help cope and work through the urges and sensations and help progress towards a healthier habit and reaction. Behavioral Health Daily Check In form revealed that patient is not experiencing SI/HI thoughts or plans, remaining abstinent from drugs and alcohol, and report's goal today of  \"staying on medicine. \"      Patient's Goal:  1. Dep F 33.1 K. Pt will identify what skills she is not using because she believes they are too hard to implement and allow help in challenging those beliefs    Notes: Severiano Carballo participated in group with prompting. She was quiet about her own issues but she continues to have a lot of feedback to others about what they can do. She recognizes that \"I don't often do what I tell others to do. \"  She spoke about how the medication does appear to help her slow down in her reactions. Status After Intervention:  Unchanged    Participation Level:  Active Listener and Interactive    Participation Quality: Appropriate, Attentive, and Supportive      Speech:  normal      Thought Process/Content: Perseverating      Affective Functioning: Congruent      Mood: anxious and depressed      Level of consciousness:  Alert, Oriented x4, and Attentive      Response to

## 2023-07-07 ENCOUNTER — APPOINTMENT (OUTPATIENT)
Facility: HOSPITAL | Age: 68
End: 2023-07-07
Payer: MEDICARE

## 2023-07-11 ENCOUNTER — HOSPITAL ENCOUNTER (OUTPATIENT)
Facility: HOSPITAL | Age: 68
Discharge: HOME | End: 2023-07-14
Payer: MEDICARE

## 2023-07-11 PROCEDURE — 90853 GROUP PSYCHOTHERAPY: CPT

## 2023-07-13 ENCOUNTER — HOSPITAL ENCOUNTER (OUTPATIENT)
Facility: HOSPITAL | Age: 68
Discharge: HOME | End: 2023-07-16
Payer: MEDICARE

## 2023-07-13 PROCEDURE — 90853 GROUP PSYCHOTHERAPY: CPT

## 2023-07-14 ENCOUNTER — HOSPITAL ENCOUNTER (OUTPATIENT)
Facility: HOSPITAL | Age: 68
Discharge: HOME | End: 2023-07-17
Payer: MEDICARE

## 2023-07-14 PROCEDURE — 90853 GROUP PSYCHOTHERAPY: CPT

## 2023-07-19 ENCOUNTER — TELEPHONE (OUTPATIENT)
Age: 68
End: 2023-07-19

## 2023-07-19 NOTE — TELEPHONE ENCOUNTER
Documentation Only:    Received a referall reached out to patient 3x. Have not been able to schedule patient.     1st attempt 7/12  2nd attempt 7/17  3rd attempt 7/19

## 2023-07-20 ENCOUNTER — HOSPITAL ENCOUNTER (OUTPATIENT)
Facility: HOSPITAL | Age: 68
Discharge: HOME | End: 2023-07-23
Payer: MEDICARE

## 2023-07-20 NOTE — BH NOTE
Group Therapy Note    Date: 7/20/2023    Group Start Time: 12:00 PM  Group End Time: 12:50 PM  Group Topic: Psychotherapy    [facility]AVIOR[facility] OP    [facility]        Group Therapy Note    Focus of session was on mindfulness of others and how this can improve relationships in our lives. We spoke about how this can mean that we really work to pay attention to what others are saying and how to clear up any conflicts with others as peacefully but effectively as we can. We spoke about how we can offer validation of others and how this can improve relationships and can be the encouragement others need to validate us when we need to. We spoke about the importance of not making assumptions and to be our own person in any relationship. We then spoke about how we can practice being mindful of others such as letting go of focus on my own internal thoughts and be curious about others around me. Patient's Goal:  1. Dep F 33.1 G. Pt will identify how she can build on her self-worth and acceptance in order to prevent future setbacks    Notes: Kathy Overall participated in group with prompting. She spoke about how she can focus on slowing down in her reactions with her son and that she has to work on setting boundaries. She is struggling with being consistent with him and that she is frustrated. Status After Intervention:  Unchanged    Participation Level:  Active Listener and Interactive    Participation Quality: Appropriate, Attentive, and Sharing      Speech:  normal      Thought Process/Content: Flight of ideas      Affective Functioning: Congruent      Mood: anxious and irritable      Level of consciousness:  Alert, Oriented x4, and Attentive      Response to Learning: Able to verbalize current knowledge/experience and Able to retain information          Modes of Intervention: Education, Support, Socialization, and

## 2023-07-21 ENCOUNTER — HOSPITAL ENCOUNTER (OUTPATIENT)
Facility: HOSPITAL | Age: 68
Discharge: HOME | End: 2023-07-24
Payer: MEDICARE

## 2023-07-21 PROCEDURE — 90853 GROUP PSYCHOTHERAPY: CPT

## 2023-07-25 ENCOUNTER — HOSPITAL ENCOUNTER (OUTPATIENT)
Facility: HOSPITAL | Age: 68
Discharge: HOME | End: 2023-07-28
Payer: MEDICARE

## 2023-07-25 PROCEDURE — 90853 GROUP PSYCHOTHERAPY: CPT

## 2023-07-27 ENCOUNTER — HOSPITAL ENCOUNTER (OUTPATIENT)
Facility: HOSPITAL | Age: 68
End: 2023-07-27
Payer: MEDICARE

## 2023-07-27 PROCEDURE — 90853 GROUP PSYCHOTHERAPY: CPT

## 2023-07-28 ENCOUNTER — HOSPITAL ENCOUNTER (OUTPATIENT)
Facility: HOSPITAL | Age: 68
End: 2023-07-28
Payer: MEDICARE

## 2023-07-28 PROCEDURE — 90853 GROUP PSYCHOTHERAPY: CPT

## 2023-08-01 ENCOUNTER — HOSPITAL ENCOUNTER (OUTPATIENT)
Facility: HOSPITAL | Age: 68
Setting detail: RECURRING SERIES
Discharge: HOME | End: 2023-08-04
Payer: MEDICARE

## 2023-08-01 PROCEDURE — 90853 GROUP PSYCHOTHERAPY: CPT

## 2023-08-03 ENCOUNTER — HOSPITAL ENCOUNTER (OUTPATIENT)
Facility: HOSPITAL | Age: 68
Setting detail: RECURRING SERIES
Discharge: HOME | End: 2023-08-06
Payer: MEDICARE

## 2023-08-03 PROCEDURE — 90853 GROUP PSYCHOTHERAPY: CPT

## 2023-08-03 PROCEDURE — 99214 OFFICE O/P EST MOD 30 MIN: CPT | Performed by: PSYCHIATRY & NEUROLOGY

## 2023-08-03 NOTE — PROGRESS NOTES
SOP PROGRESS NOTE    INTERVAL HISTORY/FINDINGS:  States she's been feeling \"better\" overall since being on the Remeron, and she's very pleased that \"we've finally found something that works\". She is concerned about gaining weight, but her appetite is no stronger on the Remeron, and she's only gained a few #'s and it's leveled off now. Her N/V functioning has been much better, and she's feeling brighter and more hopeful for the first time in awhile. Not dwelling on the negative things going on in the world now, and she's staying active as well. No SE's with the Remeron and she wants to stay with it at the same dose. Has had bad SE's with several SSRI's and was hypomanic with Wellbutrin, and Trintellix was ineffective. No SI at all. MEDICATIONS:  Current Outpatient Medications   Medication Sig    mirtazapine (REMERON) 15 MG tablet Take 1 tablet by mouth nightly    levothyroxine (SYNTHROID) 150 MCG tablet Take 1 tablet by mouth daily    potassium chloride (KLOR-CON M) 10 MEQ extended release tablet Take 1 tablet by mouth daily    aspirin 81 MG chewable tablet Take 1 tablet by mouth daily    vitamin D (CHOLECALCIFEROL) 25 MCG (1000 UT) TABS tablet Take by mouth daily    hydroCHLOROthiazide (HYDRODIURIL) 12.5 MG tablet TAKE 1 TABLET BY MOUTH EVERY DAY FOR BLOOD PRESSURE    ibuprofen (ADVIL;MOTRIN) 800 MG tablet Take 1 tablet by mouth    lisinopril (PRINIVIL;ZESTRIL) 20 MG tablet Take 1 tablet by mouth daily    melatonin 5 MG TABS tablet Take by mouth     No current facility-administered medications for this encounter.      MENTAL STATUS UPDATE: (Positives in [place])  Appearance:   Neat   Disheveled  Odiferous   Appropriate  Orientation:   Time  Place  Person  Speech:   Coherent  Pressured  Garbled/Slurred  Loud   Soft  Mute  Memory:   Intact  Impaired (Recent  Remote)--Mild  Severe  Mood:   Euthymic  Depressed  Anxious  Elated  Affect:    Appropriate  Inappropriate  Labile  Blunted  Flat  Thought Content:

## 2023-08-10 ENCOUNTER — HOSPITAL ENCOUNTER (OUTPATIENT)
Facility: HOSPITAL | Age: 68
Setting detail: RECURRING SERIES
Discharge: HOME | End: 2023-08-13
Payer: MEDICARE

## 2023-08-10 PROCEDURE — 90853 GROUP PSYCHOTHERAPY: CPT

## 2023-08-10 NOTE — BH NOTE
Group Therapy Note    Date: 8/10/2023    Group Start Time: 12:00 PM  Group End Time: 12:50 PM  Group Topic: Psychotherapy    [address] Floor, [facility]        Group Therapy Note    Attendees: 10 scheduled  Focus of session was based on a handout about self-care and different aspects that we can focus on daily. We spoke about the different areas of focus which includes physical, emotional, workplace, psychological, relationship, and spiritual.  We spoke about the different strategies and what group members would like to focus on to improve their self-care for their short and long term recovery. Patient's Goal:  1. Dep F 33.1  E. Pt will create a schedule or routine including possible hobbies, activities, and connection with others to help her make progress to staying out of her room all day    Notes: Juan Mcelroy participated in group with prompting. She spoke about how she knows that she has to stay focused on what works for her and what is healthy for her as well. She spoke about her awareness that there are some strategies for self care \"that are not healthy for me and I can see the point of finding outlets that re- energize me rather than deplete me. \"      Status After Intervention:  Improved    Participation Level: Active Listener and Interactive    Participation Quality: Appropriate, Attentive, Sharing, and Supportive      Speech:  normal      Thought Process/Content: Flight of ideas      Affective Functioning: Congruent      Mood: anxious      Level of consciousness:  Alert, Oriented x4, and Attentive      Response to Learning: Able to verbalize current knowledge/experience      Modes of Intervention: Education, Support, Socialization, Exploration, and Clarifying      Discipline Responsible: /Counselor      Signature:   Jo Medellin [facility]

## 2023-08-11 ENCOUNTER — HOSPITAL ENCOUNTER (OUTPATIENT)
Facility: HOSPITAL | Age: 68
Setting detail: RECURRING SERIES
Discharge: HOME | End: 2023-08-14
Payer: MEDICARE

## 2023-08-11 PROCEDURE — 90853 GROUP PSYCHOTHERAPY: CPT

## 2023-08-11 NOTE — BH NOTE
behavior, and Progressing to goal      Modes of Intervention: Education, Support, Socialization, Exploration, Clarifying, Problem-solving, and Activity      Discipline Responsible: /Counselor      Signature:  Noel Galvan LCSW

## 2023-08-11 NOTE — BH NOTE
her.     Status After Intervention:  Improved    Participation Level:  Active Listener and Interactive    Participation Quality: Appropriate, Attentive, and Sharing      Speech:  normal      Thought Process/Content: Logical      Affective Functioning: Congruent      Mood: bright      Level of consciousness:  Alert and Attentive      Response to Learning: Able to verbalize current knowledge/experience, Able to verbalize/acknowledge new learning, Able to retain information, Capable of insight, and Able to change behavior      Modes of Intervention: Education, Support, Socialization, Exploration, Clarifying, and Problem-solving      Discipline Responsible: /Counselor      Signature:  Brandy Auguste LCSW

## 2023-08-15 ENCOUNTER — HOSPITAL ENCOUNTER (OUTPATIENT)
Facility: HOSPITAL | Age: 68
Setting detail: RECURRING SERIES
Discharge: HOME | End: 2023-08-18
Payer: MEDICARE

## 2023-08-15 PROCEDURE — 90853 GROUP PSYCHOTHERAPY: CPT

## 2023-08-16 ENCOUNTER — HOSPITAL ENCOUNTER (OUTPATIENT)
Facility: HOSPITAL | Age: 68
Setting detail: RECURRING SERIES
Discharge: HOME | End: 2023-08-19
Payer: MEDICARE

## 2023-08-16 PROCEDURE — 90853 GROUP PSYCHOTHERAPY: CPT

## 2023-08-22 ENCOUNTER — HOSPITAL ENCOUNTER (OUTPATIENT)
Facility: HOSPITAL | Age: 68
Setting detail: RECURRING SERIES
Discharge: HOME | End: 2023-08-25
Payer: MEDICARE

## 2023-08-22 DIAGNOSIS — F33.1 MAJOR DEPRESSIVE DISORDER, RECURRENT EPISODE, MODERATE (HCC): Primary | ICD-10-CM

## 2023-08-22 PROCEDURE — 90853 GROUP PSYCHOTHERAPY: CPT

## 2023-08-22 RX ORDER — MIRTAZAPINE 30 MG/1
30 TABLET, FILM COATED ORAL NIGHTLY
Qty: 90 TABLET | Refills: 1 | Status: SHIPPED | OUTPATIENT
Start: 2023-08-22

## 2023-08-22 NOTE — PROGRESS NOTES
Has been more flat and dysphoric the past week or so. No trigger for it other than she expects to feel this way because it's been her history.  Will increase the Remeron to 30 mg QHS--#90 with 1 refill

## 2023-08-24 ENCOUNTER — HOSPITAL ENCOUNTER (OUTPATIENT)
Facility: HOSPITAL | Age: 68
Setting detail: RECURRING SERIES
Discharge: HOME | End: 2023-08-27
Payer: MEDICARE

## 2023-08-24 PROCEDURE — 90853 GROUP PSYCHOTHERAPY: CPT

## 2023-08-29 ENCOUNTER — HOSPITAL ENCOUNTER (OUTPATIENT)
Facility: HOSPITAL | Age: 68
Setting detail: RECURRING SERIES
Discharge: HOME | End: 2023-09-01
Payer: MEDICARE

## 2023-08-29 PROCEDURE — 90853 GROUP PSYCHOTHERAPY: CPT

## 2023-08-29 NOTE — BH NOTE
Group Therapy Note    Date: 8/29/2023    Group Start Time: 10:00 AM  Group End Time: 10:50 AM  Group Topic: Psychotherapy    [address], [facility]        Group Therapy Note    Attendees: 9 scheduled   Focus of session was from [employer] Today about [employer] and the idea that we can become boxed into a way of thinking that leads to increased anxiety, depression and other emotional setbacks. We spoke about the idea is that we want to work to always seeing an alternative way of interpreting a given set of facts and ideas for how we can put our thinking patterns and thoughts, in particular, to the test so we can look for faulty thinking patterns. We discussed in detail that a belief is not a fact but an opinion and what beliefs patient holds right now that may be holding patient back and how to challenge that belief. Behavioral Health Daily Check In form revealed that patient is not experiencing SI/HI thoughts or plans, remaining abstinent from drugs and alcohol, and report's goal today of  \"trying to stay positive and stay on medications. \"      Patient's Goal:  1. Dep F 33.0  E. Pt will create a schedule or routine including possible hobbies, activities, and connection with others to help her make progress to staying out of her room all day    Notes: [facility] participated in group with prompting. She was quiet but she was able to follow topic and she did support others. She spoke about how she knows that she continues to need encouragement to see alternative points of view. Status After Intervention:  Unchanged    Participation Level:  Active Listener and Minimal    Participation Quality: Appropriate, Attentive, Sharing, and Supportive      Speech:  normal      Thought Process/Content: Logical  Linear      Affective Functioning: Congruent      Mood: anxious and depressed      Level of consciousness:

## 2023-08-31 ENCOUNTER — HOSPITAL ENCOUNTER (OUTPATIENT)
Facility: HOSPITAL | Age: 68
Setting detail: RECURRING SERIES
End: 2023-08-31
Payer: MEDICARE

## 2023-08-31 PROCEDURE — 90853 GROUP PSYCHOTHERAPY: CPT

## 2023-09-05 ENCOUNTER — HOSPITAL ENCOUNTER (OUTPATIENT)
Facility: HOSPITAL | Age: 68
Setting detail: RECURRING SERIES
Discharge: HOME | End: 2023-09-08
Payer: MEDICARE

## 2023-09-05 NOTE — BH NOTE
[facility]  Group Therapy  Absence Note      Date of Service:  9/5/2023    Salty Ibarra called stating she was in [place] today visiting her brother. She will be back by her next scheduled treatment day.

## 2023-09-06 NOTE — BH NOTE
[facility]  Group Therapy  Treatment Plan Review     TREATMENT PLAN REVIEW REVIEW DATE: 8/31/2023     summary of Ongoing issues with Symptoms/Functional Impairments Indicating Need for Continued Stay:  Michael Otero has had the antidepressant increased due to another increase in depressive symptoms. she has been willing to keep taking it and to the increase. She has had more insight into issues she has with her son and what may be solutions to those issues. She has been more open about the impact of the loss of her daughter and how she has been recognizing that she has not been allowing herself to feel the emotions. She will continue to attend two days a week at this time. TREATMENT & DISCHARGE PLANNING CHANGES    Modality or Intervention  Changes We will continue to have pt attend three groups a day, two days a week. Psychotropic Medication Changes   On 8/22/2203 Remeron increased to 30 mg QHS   Discharge  Planning or Target Date  Changes 10/31/2023   New Issues No new issues     TREATMENT TEAM SIGNATURE / DATE   I have participated in the development of this plan of treatment and agreed to its implementation.    Client Signature PRINTED Name                       Junaid Fu Date & Time       Family / Legal Representative Signature (If Applicable) PRINTED Name & Relationship     Date & Time   Therapist Signature PRINTED Name & Jeana Cruz, LCSW Date & Time       Therapist Signature PRINTED Name & Evelio Munguia LCSW   Date & Time       Other Signature PRINTED Name & Credential     Date & Time   Other Signature PRINTED Name & Credential or Relationship     Date & Time       PHYSICIAN CERTIFICATION OF THE LEVEL OF CARE:  I certify that these outpatient behavioral health services are medically necessary to improve and maintain the patient's condition and functional level and prevent relapse or admission

## 2023-09-07 ENCOUNTER — HOSPITAL ENCOUNTER (OUTPATIENT)
Facility: HOSPITAL | Age: 68
Setting detail: RECURRING SERIES
Discharge: HOME | End: 2023-09-10
Payer: MEDICARE

## 2023-09-07 PROCEDURE — 90853 GROUP PSYCHOTHERAPY: CPT

## 2023-09-12 ENCOUNTER — HOSPITAL ENCOUNTER (OUTPATIENT)
Facility: HOSPITAL | Age: 68
Setting detail: RECURRING SERIES
Discharge: HOME | End: 2023-09-15
Payer: MEDICARE

## 2023-09-12 PROCEDURE — 90853 GROUP PSYCHOTHERAPY: CPT

## 2023-09-12 RX ORDER — LEVOTHYROXINE SODIUM 0.15 MG/1
150 TABLET ORAL DAILY
Qty: 90 TABLET | Refills: 0 | Status: SHIPPED | OUTPATIENT
Start: 2023-09-12

## 2023-09-14 ENCOUNTER — HOSPITAL ENCOUNTER (OUTPATIENT)
Facility: HOSPITAL | Age: 68
Setting detail: RECURRING SERIES
Discharge: HOME | End: 2023-09-17
Payer: MEDICARE

## 2023-09-14 PROCEDURE — 90853 GROUP PSYCHOTHERAPY: CPT

## 2023-09-19 ENCOUNTER — HOSPITAL ENCOUNTER (OUTPATIENT)
Facility: HOSPITAL | Age: 68
Setting detail: RECURRING SERIES
Discharge: HOME | End: 2023-09-22
Payer: MEDICARE

## 2023-09-19 PROCEDURE — 90853 GROUP PSYCHOTHERAPY: CPT

## 2023-09-21 ENCOUNTER — HOSPITAL ENCOUNTER (OUTPATIENT)
Facility: HOSPITAL | Age: 68
Setting detail: RECURRING SERIES
Discharge: HOME | End: 2023-09-24
Payer: MEDICARE

## 2023-09-21 PROCEDURE — 90853 GROUP PSYCHOTHERAPY: CPT

## 2023-09-26 ENCOUNTER — HOSPITAL ENCOUNTER (OUTPATIENT)
Facility: HOSPITAL | Age: 68
Setting detail: RECURRING SERIES
Discharge: HOME | End: 2023-09-29
Payer: MEDICARE

## 2023-09-26 PROCEDURE — 90853 GROUP PSYCHOTHERAPY: CPT

## 2023-09-28 ENCOUNTER — HOSPITAL ENCOUNTER (OUTPATIENT)
Facility: HOSPITAL | Age: 68
Setting detail: RECURRING SERIES
End: 2023-09-28
Payer: MEDICARE

## 2023-10-03 ENCOUNTER — HOSPITAL ENCOUNTER (OUTPATIENT)
Facility: HOSPITAL | Age: 68
Setting detail: RECURRING SERIES
Discharge: HOME | End: 2023-10-06
Payer: MEDICARE

## 2023-10-03 PROBLEM — F31.81 BIPOLAR II DISORDER (HCC): Status: ACTIVE | Noted: 2023-10-03

## 2023-10-03 PROBLEM — F33.1 MAJOR DEPRESSIVE DISORDER, RECURRENT, MODERATE (HCC): Status: ACTIVE | Noted: 2020-07-20

## 2023-10-03 PROCEDURE — 90853 GROUP PSYCHOTHERAPY: CPT

## 2023-10-03 PROCEDURE — 99214 OFFICE O/P EST MOD 30 MIN: CPT | Performed by: PSYCHIATRY & NEUROLOGY

## 2023-10-03 RX ORDER — MIRTAZAPINE 45 MG/1
45 TABLET, FILM COATED ORAL NIGHTLY
Qty: 90 TABLET | Refills: 1 | Status: SHIPPED | OUTPATIENT
Start: 2023-10-03

## 2023-10-03 NOTE — PROGRESS NOTES
SOP PROGRESS NOTE    INTERVAL HISTORY/FINDINGS:   States she's been \"in a funk\" overall lately, and she hasn't seen much response since I increased the Remeron to 30 mg 6 weeks ago. She actually feels the last 10 days or so have been the worst, but her depression is still only moderately severe at worst. Discussed her history further and the possibility she may have a BPAD, and she acknowledges that she's had hypomanic-like episodes in the past. No overt SE's with the Remeron except she's gained some weight--now 165#. Says she was only 143# 6+ months ago, but she only attributes ~7-8# of weight gain to the Remeron. Her N/V functioning has still been adequate and she denies any SI, despite thinking that she's aging fast and may not live a lot longer. Has had bad SE's with several SSRI's and was hypomanic with Wellbutrin, and Trintellix was ineffective. MEDICATIONS:  Current Outpatient Medications   Medication Sig    levothyroxine (SYNTHROID) 150 MCG tablet TAKE 1 TABLET BY MOUTH EVERY DAY    mirtazapine (REMERON) 30 MG tablet Take 1 tablet by mouth nightly    potassium chloride (KLOR-CON M) 10 MEQ extended release tablet Take 1 tablet by mouth daily    aspirin 81 MG chewable tablet Take 1 tablet by mouth daily    vitamin D (CHOLECALCIFEROL) 25 MCG (1000 UT) TABS tablet Take by mouth daily    hydroCHLOROthiazide (HYDRODIURIL) 12.5 MG tablet TAKE 1 TABLET BY MOUTH EVERY DAY FOR BLOOD PRESSURE    ibuprofen (ADVIL;MOTRIN) 800 MG tablet Take 1 tablet by mouth    lisinopril (PRINIVIL;ZESTRIL) 20 MG tablet Take 1 tablet by mouth daily    melatonin 5 MG TABS tablet Take by mouth     No current facility-administered medications for this encounter.      MENTAL STATUS UPDATE: (Positives in [place])  Appearance:   Neat   Disheveled  Odiferous   Appropriate  Orientation:   Time  Place  Person  Speech:   Coherent  Pressured  Garbled/Slurred  Loud   Soft  Mute  Memory:   Intact  Impaired (Recent  Remote)--Mild  Severe  Mood:

## 2023-10-05 ENCOUNTER — HOSPITAL ENCOUNTER (OUTPATIENT)
Facility: HOSPITAL | Age: 68
Setting detail: RECURRING SERIES
Discharge: HOME | End: 2023-10-08
Payer: MEDICARE

## 2023-10-05 PROCEDURE — 90853 GROUP PSYCHOTHERAPY: CPT

## 2023-10-05 NOTE — BH NOTE
new learning, Able to retain information, Capable of insight, Able to change behavior, and Progressing to goal      Modes of Intervention: Education, Support, Socialization, Exploration, Clarifying, and Problem-solving      Discipline Responsible: /Counselor      Signature:  Katie Hensley LCSW

## 2023-10-05 NOTE — BH NOTE
Exploration, and Clarifying      Discipline Responsible: /Counselor      Signature:  Maria Del Rosario Duran LCSW

## 2023-10-12 ENCOUNTER — HOSPITAL ENCOUNTER (OUTPATIENT)
Facility: HOSPITAL | Age: 68
Setting detail: RECURRING SERIES
End: 2023-10-12
Payer: MEDICARE

## 2023-10-12 NOTE — BH NOTE
Group Therapy Note    Date: 10/12/2023    Tawnya Leonard is out of town with her brother this week and will be back on her next scheduled treatment day. Signature:   Cecilia Medellin LCSW

## 2023-10-17 ENCOUNTER — APPOINTMENT (OUTPATIENT)
Facility: HOSPITAL | Age: 68
End: 2023-10-17
Payer: MEDICARE

## 2023-10-19 ENCOUNTER — APPOINTMENT (OUTPATIENT)
Facility: HOSPITAL | Age: 68
End: 2023-10-19
Payer: MEDICARE

## 2023-10-25 ENCOUNTER — TELEMEDICINE (OUTPATIENT)
Age: 68
End: 2023-10-25
Payer: MEDICARE

## 2023-10-25 DIAGNOSIS — U07.1 COVID: Primary | ICD-10-CM

## 2023-10-25 DIAGNOSIS — B35.1 NAIL FUNGUS: ICD-10-CM

## 2023-10-25 PROCEDURE — G8400 PT W/DXA NO RESULTS DOC: HCPCS | Performed by: NURSE PRACTITIONER

## 2023-10-25 PROCEDURE — 3017F COLORECTAL CA SCREEN DOC REV: CPT | Performed by: NURSE PRACTITIONER

## 2023-10-25 PROCEDURE — 99214 OFFICE O/P EST MOD 30 MIN: CPT | Performed by: NURSE PRACTITIONER

## 2023-10-25 PROCEDURE — 1090F PRES/ABSN URINE INCON ASSESS: CPT | Performed by: NURSE PRACTITIONER

## 2023-10-25 PROCEDURE — 1123F ACP DISCUSS/DSCN MKR DOCD: CPT | Performed by: NURSE PRACTITIONER

## 2023-10-25 PROCEDURE — G8427 DOCREV CUR MEDS BY ELIG CLIN: HCPCS | Performed by: NURSE PRACTITIONER

## 2023-10-25 RX ORDER — ALBUTEROL SULFATE 90 UG/1
2 AEROSOL, METERED RESPIRATORY (INHALATION) EVERY 6 HOURS PRN
Qty: 18 G | Refills: 3 | Status: SHIPPED | OUTPATIENT
Start: 2023-10-25

## 2023-10-25 RX ORDER — AZITHROMYCIN 250 MG/1
250 TABLET, FILM COATED ORAL SEE ADMIN INSTRUCTIONS
Qty: 6 TABLET | Refills: 0 | Status: SHIPPED | OUTPATIENT
Start: 2023-10-25 | End: 2023-10-30

## 2023-10-25 ASSESSMENT — COLUMBIA-SUICIDE SEVERITY RATING SCALE - C-SSRS
1. WITHIN THE PAST MONTH, HAVE YOU WISHED YOU WERE DEAD OR WISHED YOU COULD GO TO SLEEP AND NOT WAKE UP?: NO
2. HAVE YOU ACTUALLY HAD ANY THOUGHTS OF KILLING YOURSELF?: NO
BASED ON RESPONSES TO C-SSRS QS 1-6, WHAT IS THE PATIENT'S OVERALL RISK RATING FOR SUICIDE: NO RISK
5. HAVE YOU STARTED TO WORK OUT OR WORKED OUT THE DETAILS OF HOW TO KILL YOURSELF? DO YOU INTEND TO CARRY OUT THIS PLAN?: NO
3. HAVE YOU BEEN THINKING ABOUT HOW YOU MIGHT KILL YOURSELF?: NO
7. DID THIS OCCUR IN THE LAST THREE MONTHS: NO
4. HAVE YOU HAD THESE THOUGHTS AND HAD SOME INTENTION OF ACTING ON THEM?: NO
6. HAVE YOU EVER DONE ANYTHING, STARTED TO DO ANYTHING, OR PREPARED TO DO ANYTHING TO END YOUR LIFE?: NO

## 2023-10-25 ASSESSMENT — PATIENT HEALTH QUESTIONNAIRE - PHQ9
5. POOR APPETITE OR OVEREATING: 0
SUM OF ALL RESPONSES TO PHQ QUESTIONS 1-9: 0
8. MOVING OR SPEAKING SO SLOWLY THAT OTHER PEOPLE COULD HAVE NOTICED. OR THE OPPOSITE, BEING SO FIGETY OR RESTLESS THAT YOU HAVE BEEN MOVING AROUND A LOT MORE THAN USUAL: 0
2. FEELING DOWN, DEPRESSED OR HOPELESS: 0
7. TROUBLE CONCENTRATING ON THINGS, SUCH AS READING THE NEWSPAPER OR WATCHING TELEVISION: 0
SUM OF ALL RESPONSES TO PHQ QUESTIONS 1-9: 0
9. THOUGHTS THAT YOU WOULD BE BETTER OFF DEAD, OR OF HURTING YOURSELF: 0
4. FEELING TIRED OR HAVING LITTLE ENERGY: 0
3. TROUBLE FALLING OR STAYING ASLEEP: 0
6. FEELING BAD ABOUT YOURSELF - OR THAT YOU ARE A FAILURE OR HAVE LET YOURSELF OR YOUR FAMILY DOWN: 0

## 2023-10-25 NOTE — PROGRESS NOTES
Masood Moses, was evaluated through a synchronous (real-time) audio-video encounter. The patient (or guardian if applicable) is aware that this is a billable service, which includes applicable co-pays. This Virtual Visit was conducted with patient's (and/or legal guardian's) consent. Patient identification was verified, and a caregiver was present when appropriate. The patient was located at Home: [address] [place], [place] [id number]  Provider was located at [facility] (Appt Dept): [address],  [address]  I Confirm  that I am appropriately licensed, registered, or certified to deliver care in the state where the patient is located as indicated above. Masood Moses (:  1955) is a Established patient, presenting virtually for evaluation of the following:    Assessment & Plan   Below is the assessment and plan developed based on review of pertinent history, physical exam, labs, studies, and medications. 1. COVID  .orders  2. Nail fungus  -     ciclopirox (PENLAC) 8 % solution; Apply topically nightly for 90 days to affected nails . , Disp-6 mL, R-0, Normal    No follow-ups on file. Orders Placed This Encounter    azithromycin (ZITHROMAX) 250 MG tablet     Sig: Take 1 tablet by mouth See Admin Instructions for 5 days 500mg on day 1 followed by 250mg on days 2 - 5     Dispense:  6 tablet     Refill:  0    albuterol sulfate HFA (PROVENTIL;VENTOLIN;PROAIR) 108 (90 Base) MCG/ACT inhaler     Sig: Inhale 2 puffs into the lungs every 6 hours as needed for Wheezing     Dispense:  18 g     Refill:  3    ciclopirox (PENLAC) 8 % solution     Sig: Apply topically nightly for 90 days to affected nails . Dispense:  6 mL     Refill:  0        Subjective   HPI  Review of Systems     Ms. Talha Henning endorses she was diagnosed with Covid on 10/13/2023. She was prescribed paxlovid, steroids and an albuterol inhaler .   She completed the treatment but continues to have head

## 2023-11-02 ENCOUNTER — HOSPITAL ENCOUNTER (OUTPATIENT)
Facility: HOSPITAL | Age: 68
Setting detail: RECURRING SERIES
Discharge: HOME | End: 2023-11-05
Payer: MEDICARE

## 2023-11-02 PROCEDURE — 90853 GROUP PSYCHOTHERAPY: CPT

## 2023-11-05 DIAGNOSIS — I10 ESSENTIAL (PRIMARY) HYPERTENSION: ICD-10-CM

## 2023-11-05 DIAGNOSIS — F41.8 OTHER SPECIFIED ANXIETY DISORDERS: ICD-10-CM

## 2023-11-05 RX ORDER — LISINOPRIL 20 MG/1
TABLET ORAL
Qty: 90 TABLET | Refills: 3 | Status: SHIPPED | OUTPATIENT
Start: 2023-11-05

## 2023-11-05 RX ORDER — HYDROCHLOROTHIAZIDE 12.5 MG/1
TABLET ORAL
Qty: 90 TABLET | Refills: 3 | Status: SHIPPED | OUTPATIENT
Start: 2023-11-05

## 2023-11-07 ENCOUNTER — HOSPITAL ENCOUNTER (OUTPATIENT)
Facility: HOSPITAL | Age: 68
Setting detail: RECURRING SERIES
Discharge: HOME | End: 2023-11-10
Payer: MEDICARE

## 2023-11-07 PROCEDURE — 90853 GROUP PSYCHOTHERAPY: CPT

## 2023-11-09 ENCOUNTER — HOSPITAL ENCOUNTER (OUTPATIENT)
Facility: HOSPITAL | Age: 68
Setting detail: RECURRING SERIES
End: 2023-11-09
Payer: MEDICARE

## 2023-11-09 DIAGNOSIS — F11.21 OPIOID DEPENDENCE IN REMISSION (HCC): ICD-10-CM

## 2023-11-09 DIAGNOSIS — F31.81 BIPOLAR II DISORDER (HCC): Primary | ICD-10-CM

## 2023-11-09 DIAGNOSIS — F33.1 MAJOR DEPRESSIVE DISORDER, RECURRENT, MODERATE (HCC): ICD-10-CM

## 2023-11-09 PROCEDURE — 99214 OFFICE O/P EST MOD 30 MIN: CPT | Performed by: PSYCHIATRY & NEUROLOGY

## 2023-11-09 PROCEDURE — 90853 GROUP PSYCHOTHERAPY: CPT

## 2023-11-09 RX ORDER — LAMOTRIGINE 25 MG/1
50 TABLET ORAL 2 TIMES DAILY
Qty: 120 TABLET | Refills: 1 | Status: SHIPPED | OUTPATIENT
Start: 2023-11-09

## 2023-11-09 NOTE — PROGRESS NOTES
SOP PROGRESS NOTE    INTERVAL HISTORY/FINDINGS:  States she's not increased the Remeron to 45 mg yet and that she's continued to gain weight--now almost 170# (4-5# since the last time). Her mood hasn't improved since the last time and she continues to feel at least mildly depressed, most of the time. Hasn't had any hypomanic moments, but objectively she appears slightly more animated than would be expected. Her N/V functioning remains adequate and she denies any SI, despite occasionally feeling close to hopeless. We discussed several Rx options and she'll increase the Remeron now, although we may need to change or stop it if weight gain is an issue. Will also start Lamictal at 25 mg BID and increase to 50/50 over several weeks. Has had bad SE's with several SSRI's and was hypomanic with Wellbutrin, and Trintellix was ineffective.,    MEDICATIONS:  Current Outpatient Medications   Medication Sig    lisinopril (PRINIVIL;ZESTRIL) 20 MG tablet TAKE 1 TABLET BY MOUTH EVERY DAY FOR HIGH BLOOD PRESSURE    hydroCHLOROthiazide (HYDRODIURIL) 12.5 MG tablet TAKE 1 TABLET BY MOUTH EVERY DAY FOR BLOOD PRESSURE    albuterol sulfate HFA (PROVENTIL;VENTOLIN;PROAIR) 108 (90 Base) MCG/ACT inhaler Inhale 2 puffs into the lungs every 6 hours as needed for Wheezing    ciclopirox (PENLAC) 8 % solution Apply topically nightly for 90 days to affected nails . mirtazapine (REMERON) 45 MG tablet Take 1 tablet by mouth nightly    levothyroxine (SYNTHROID) 150 MCG tablet TAKE 1 TABLET BY MOUTH EVERY DAY    potassium chloride (KLOR-CON M) 10 MEQ extended release tablet Take 1 tablet by mouth daily    aspirin 81 MG chewable tablet Take 1 tablet by mouth daily    vitamin D (CHOLECALCIFEROL) 25 MCG (1000 UT) TABS tablet Take by mouth daily    ibuprofen (ADVIL;MOTRIN) 800 MG tablet Take 1 tablet by mouth    melatonin 5 MG TABS tablet Take by mouth     No current facility-administered medications for this encounter.      MENTAL STATUS UPDATE:

## 2023-11-14 ENCOUNTER — APPOINTMENT (OUTPATIENT)
Facility: HOSPITAL | Age: 68
End: 2023-11-14
Payer: MEDICARE

## 2023-11-14 PROCEDURE — 90853 GROUP PSYCHOTHERAPY: CPT

## 2023-11-16 ENCOUNTER — APPOINTMENT (OUTPATIENT)
Facility: HOSPITAL | Age: 68
End: 2023-11-16
Payer: MEDICARE

## 2023-11-16 PROCEDURE — 90853 GROUP PSYCHOTHERAPY: CPT

## 2023-11-21 ENCOUNTER — HOSPITAL ENCOUNTER (OUTPATIENT)
Facility: HOSPITAL | Age: 68
Setting detail: RECURRING SERIES
Discharge: HOME | End: 2023-11-24
Payer: MEDICARE

## 2023-11-21 PROCEDURE — 90853 GROUP PSYCHOTHERAPY: CPT

## 2023-11-28 ENCOUNTER — HOSPITAL ENCOUNTER (OUTPATIENT)
Facility: HOSPITAL | Age: 68
Setting detail: RECURRING SERIES
Discharge: HOME | End: 2023-12-01
Payer: MEDICARE

## 2023-11-28 PROCEDURE — 90853 GROUP PSYCHOTHERAPY: CPT

## 2023-11-28 NOTE — BH NOTE
Group Therapy Note    Date: 11/28/2023    Group Start Time: 10:00 AM  Group End Time: 10:50 AM  Group Topic: Psychotherapy    [facility]    Lacie, Karissa Svetlana, LCSW        Group Therapy Note    Attendees: 11 scheduled    Focus of session was a review of the weekend and helping group members see their wins that they can celebrate from the past several days. We spoke about the things that occurred and choices that they had in front of them and helping everyone to see the positive impact that they had on their well being. We also spoke about what may have been the setbacks and how they coped and what they can plan to do for the future to help build on relapse prevention. Behavioral Health Daily Check In form revealed that patient is not experiencing SI/HI thoughts or plans, remaining abstinent from drugs and alcohol, and report's goal today of  \"stay positive and taking my medications. \"       Patient's Goal:  1. Dep F 33.0  O.  pt will work on self-forgiveness for her past mistakes, especially with her children, and work towards acceptance of her son's perspective at this time    Notes: Franny Cardoso participated in group with prompting. She spoke about how she had a good weekend but \"it started out rough on Thanksgiving with my brother. I got there with my son and my dog and he immediately said I owed his GF an apology for what happened on our trip last month. \"  She spoke about how she told him that they had resolved the issues between them and she was not going to apologize again. She also asked his GF about it \"and I told her that I thought we were good and resolved it. \"  She feels good about how she managed this with them and ended up having a good Thanksgiving. Status After Intervention:  Unchanged    Participation Level:  Active Listener and Interactive    Participation Quality: Appropriate, Attentive, Sharing, and

## 2023-11-30 ENCOUNTER — HOSPITAL ENCOUNTER (OUTPATIENT)
Facility: HOSPITAL | Age: 68
Setting detail: RECURRING SERIES
End: 2023-11-30
Payer: MEDICARE

## 2023-11-30 NOTE — BH NOTE
Group Therapy Note    Date: 11/30/2023    Franny Cardoso called and left a message stating she was not coming for her scheduled treatment day. She did not give a reason why. Signature:   Karissa Medellin LCSW

## 2023-12-01 RX ORDER — LAMOTRIGINE 25 MG/1
50 TABLET ORAL 2 TIMES DAILY
Qty: 360 TABLET | Refills: 1 | Status: SHIPPED | OUTPATIENT
Start: 2023-12-01

## 2023-12-07 ENCOUNTER — HOSPITAL ENCOUNTER (OUTPATIENT)
Facility: HOSPITAL | Age: 68
Setting detail: RECURRING SERIES
Discharge: HOME | End: 2023-12-10
Payer: MEDICARE

## 2023-12-07 PROCEDURE — 90853 GROUP PSYCHOTHERAPY: CPT

## 2023-12-08 RX ORDER — LEVOTHYROXINE SODIUM 0.15 MG/1
150 TABLET ORAL DAILY
Qty: 90 TABLET | Refills: 0 | Status: SHIPPED | OUTPATIENT
Start: 2023-12-08

## 2023-12-12 ENCOUNTER — HOSPITAL ENCOUNTER (OUTPATIENT)
Facility: HOSPITAL | Age: 68
Setting detail: RECURRING SERIES
Discharge: HOME | End: 2023-12-15
Payer: MEDICARE

## 2023-12-12 PROCEDURE — 90853 GROUP PSYCHOTHERAPY: CPT

## 2023-12-14 ENCOUNTER — HOSPITAL ENCOUNTER (OUTPATIENT)
Facility: HOSPITAL | Age: 68
Setting detail: RECURRING SERIES
End: 2023-12-14
Payer: MEDICARE

## 2023-12-14 PROCEDURE — 90853 GROUP PSYCHOTHERAPY: CPT

## 2023-12-19 ENCOUNTER — APPOINTMENT (OUTPATIENT)
Facility: HOSPITAL | Age: 68
End: 2023-12-19
Payer: MEDICARE

## 2023-12-19 NOTE — PROGRESS NOTES
SOP PROGRESS NOTE    INTERVAL HISTORY/FINDINGS:  States she's having \"good and bad days\" emotionally, and that she's essentially still depressed, mostly to a mild degree. She does have brief periods of more severe dysphoria, and periods when her mood is better, but still slightly dysphoric. No hypomanic periods noted or reported, but she does still occasionally laugh at things, and her outward appearance isn't as dysphoric as she's feeling internally. She's been taking the Remeron at 45 mg, but she's not noticed any real response. She's only increased the Lamictal to 75 mg/day, but she also misses a number of doses at times. We discussed her Dx and the challenges with Rx, and I'll increase the Lamictal to 100 mg BID and stay on the Mirtazapine for now. No full hopelessness, yet sh often has some PDW thoughts (no SI at all). Tolerating the meds well--no rash, sedation, or further weight gain. Her N/V functioning remains at least fair. Has had bad SE's with several SSRI's and was hypomanic with Wellbutrin, and Trintellix was ineffective. MEDICATIONS:  Current Outpatient Medications   Medication Sig    KLOR-CON M10 10 MEQ extended release tablet TAKE 1 TABLET BY MOUTH EVERY DAY    levothyroxine (SYNTHROID) 150 MCG tablet TAKE 1 TABLET BY MOUTH EVERY DAY    lamoTRIgine (LAMICTAL) 25 MG tablet Take 2 tablets by mouth 2 times daily    lisinopril (PRINIVIL;ZESTRIL) 20 MG tablet TAKE 1 TABLET BY MOUTH EVERY DAY FOR HIGH BLOOD PRESSURE    hydroCHLOROthiazide (HYDRODIURIL) 12.5 MG tablet TAKE 1 TABLET BY MOUTH EVERY DAY FOR BLOOD PRESSURE    albuterol sulfate HFA (PROVENTIL;VENTOLIN;PROAIR) 108 (90 Base) MCG/ACT inhaler Inhale 2 puffs into the lungs every 6 hours as needed for Wheezing    ciclopirox (PENLAC) 8 % solution Apply topically nightly for 90 days to affected nails .     mirtazapine (REMERON) 45 MG tablet Take 1 tablet by mouth nightly    aspirin 81 MG chewable tablet Take 1 tablet by mouth daily    vitamin D

## 2023-12-21 ENCOUNTER — APPOINTMENT (OUTPATIENT)
Facility: HOSPITAL | Age: 68
End: 2023-12-21
Payer: MEDICARE

## 2023-12-21 PROCEDURE — 90853 GROUP PSYCHOTHERAPY: CPT

## 2023-12-28 ENCOUNTER — HOSPITAL ENCOUNTER (OUTPATIENT)
Facility: HOSPITAL | Age: 68
Setting detail: RECURRING SERIES
Discharge: HOME | End: 2023-12-31
Payer: MEDICARE

## 2023-12-28 PROCEDURE — 90853 GROUP PSYCHOTHERAPY: CPT

## 2023-12-28 NOTE — BH NOTE
Group Therapy Note    Date: 12/28/2023    Group Start Time: 10:00 AM  Group End Time: 10:50 AM  Group Topic: Psychotherapy    [facility]    Lacie, Abi BATEMAN, LCSW        Group Therapy Note    Attendees: 8 scheduled    Focus of session was based on information about bad habits that we hold that show a lack of self-esteem.   We reviewed what self-esteem is and how it can be built and where pt believes their current level is.  We discussed the habits which include saying yes to everything, negative self-talk, backing down when opinions are challenged or not being willing to listen to others, being indecisive, fearing failure, taking constructive criticism personally, afraid to share opinions, and claiming successes are luck or good fortune.  We spoke about where patient can begin to challenge these habits and what the focus can be to build self-esteem.   Behavioral Health Daily Check In form revealed that patient is not experiencing SI/HI thoughts or plans, remaining abstinent from drugs and alcohol, and report's goal today of  \"staying positive and taking medications.\"      Patient's Goal:  1. Dep F 33.0  N.  “Pt will show improvement in being aware of others and not trying to go in and fix situations but to listen and show empathy.”    Notes:  January participated in group with prompting.  She spoke about how she had a difficult [place] and shared what happened when she visited her brother. She spoke about how she is getting pulled into unhealthy dynamics of her brother and his girlfriends relationship. We continue to talk about the options she has to say no and not get involved and how she may need to repeat herself in what she says for herself and that she has to stand up for herself again and again.  Writer continues to point out what she has control over that she can change.      Status After Intervention:

## 2023-12-28 NOTE — BH NOTE
Group Therapy Note    Date: 12/28/2023    Group Start Time: 11:00 AM  Group End Time: 11:50 AM  Group Topic: Psychotherapy    [facility]    Abi Medellin LCSW        Group Therapy Note    Attendees:  8 scheduled  Focus of session was based on handout titled \"From Living in Fear to Living in [place].\"  We spoke about the impact of fear and how we can stay stuck in fear as well as what it would take to be more daring to make changes we want for ourselves.  Patient discussed what their focus can be to help challenge thoughts and habits that are keeping the fear in place.         Patient's Goal:  1. Dep F 33.0  P.  “Pt will verbalize the positive lessons she has from her past and be willing to write them down even if they came from very negative life experiences    Notes:  January participated in group with prompting.  She spoke about how she is often stuck in unhealthy thinking patterns and she is starting to recognize how they affect her.  She spoke about how she knows that she is stuck in ruts and is willing to think about what she can do differently moving forward.      Status After Intervention:  Unchanged    Participation Level: Active Listener and Interactive    Participation Quality: Attentive, Sharing, and Supportive      Speech:  normal      Thought Process/Content: Perseverating      Affective Functioning: Congruent      Mood: anxious and depressed      Level of consciousness:  Alert, Oriented x4, and Attentive      Response to Learning: Able to verbalize current knowledge/experience and Able to change behavior      Endings: None Reported    Modes of Intervention: Education, Support, Socialization, Exploration, and Clarifying      Discipline Responsible: /Counselor      Signature:  Abi Medellin LCSW

## 2024-01-02 ENCOUNTER — HOSPITAL ENCOUNTER (OUTPATIENT)
Facility: HOSPITAL | Age: 69
Setting detail: RECURRING SERIES
Discharge: HOME | End: 2024-01-05
Payer: MEDICARE

## 2024-01-02 PROCEDURE — 90853 GROUP PSYCHOTHERAPY: CPT

## 2024-01-02 NOTE — BH NOTE
Group Therapy Note    Date: 1/2/2024    Group Start Time: 11:00 AM  Group End Time: 11:50 AM  Group Topic: Psychotherapy    [facility]    Brandie Montez, SHELLI        Group Therapy Note    Attendees: 9 scheduled  Focus of session was based on the 4 agreements by Maldonado Lincoln.  The 4 agreements are: be impeccable with your word, don't take anything personally, don't make assumptions, and always do your best.  We spoke about these ideas and how they can impact our mental health recovery in a positive way and help us take steps forward.  I asked group members to share which one they will focus on and how they think it can and will impact their daily life.         Patient's Goal:  1Dep. 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  “pt will work on self-forgiveness for her past mistakes, especially with her children, and work towards acceptance of her son's perspective at this time.”      Notes:  Pt participated in the group activity and engaged with the other members.  She was supportive when sharing her thoughts about relationships, taking chances and making decisions to move forward.  She was able to describe and acknowledge what is important to her for a relationship and how that has changed over time.     Status After Intervention:  Improved    Participation Level: Active Listener and Interactive    Participation Quality: Appropriate, Attentive, and Sharing      Speech:  normal      Thought Process/Content: Logical      Affective Functioning: Congruent      Mood: bright      Level of consciousness:  Alert, Oriented x4, and Attentive      Response to Learning: Able to verbalize current knowledge/experience, Able to verbalize/acknowledge new learning, Able to retain information, Capable of insight, Able to change behavior, and Progressing to goal        Modes of Intervention: Education, Support, Socialization, Exploration, Clarifying, Problem-solving, and 
[facility]  Group Therapy  Treatment Plan Review    TREATMENT PLAN REVIEW REVIEW DATE: 12/29/2023     summary of Ongoing issues with Symptoms/Functional Impairments Indicating Need for Continued Stay:  January has been up and down in her mood and funcitoning as she reports she feels increased pressure about what to do IN REGARD TO her living situation.  She believes she wants to stay in her brothers house but feels easily overwhelmed due to some of the dynamics between herself and her brother and his girlfriend.  She remains in need of intervention and support as she can continue to be very impulsive in what she says and does.  We will continue to have pt attend two days a week at this time.      TREATMENT & DISCHARGE PLANNING CHANGES    Modality or Intervention  Changes We have transitioned pt down to three groups a day, two days a week.     Psychotropic Medication Changes On 12/19/2023 Increased the Lamictal 100 mg BID over 3 weeks     Discharge  Planning or Target Date  Changes 2/29/2023.     New Issues Increased stressors with her brother and his girlfriend.       TREATMENT TEAM SIGNATURE / DATE   I have participated in the development of this plan of treatment and agreed to its implementation.   Client Signature PRINTED Name                       January Bermeo Date & Time       Family / Legal Representative Signature (If Applicable) PRINTED Name & Relationship     Date & Time   Therapist Signature PRINTED Name & Credential                            So Medellin LCSW Date & Time       Therapist Signature PRINTED Name & Credential                         Genevieve Montez LCSW   Date & Time       Other Signature PRINTED Name & Credential     Date & Time   Other Signature PRINTED Name & Credential or Relationship     Date & Time       PHYSICIAN CERTIFICATION OF THE LEVEL OF CARE:  I certify that these outpatient behavioral health services are medically necessary to improve and 
x4, and Attentive      Response to Learning: Able to verbalize current knowledge/experience, Able to retain information, Capable of insight, and Able to change behavior      Modes of Intervention: Education, Support, Socialization, Exploration, and Clarifying      Discipline Responsible: /Counselor      Signature:  Abi Medellin LCSW

## 2024-01-04 ENCOUNTER — HOSPITAL ENCOUNTER (OUTPATIENT)
Facility: HOSPITAL | Age: 69
Setting detail: RECURRING SERIES
Discharge: HOME | End: 2024-01-07
Payer: MEDICARE

## 2024-01-04 PROCEDURE — 90853 GROUP PSYCHOTHERAPY: CPT

## 2024-01-04 NOTE — BH NOTE
Group Therapy Note    Date: 1/4/2024    Group Start Time: 11:00 AM  Group End Time: 11:50 AM  Group Topic: Psychotherapy    [facility] BEHAVIOR[facility] TH OP    Brandie Montez, SHELLI        Group Therapy Note    Attendees: 11 scheduled    Focus of session was on procrastination and the possible reasons behind why we do it.  We spoke about fear and lack of confidence and esteem can play a major role in our reasons for doing this and we spoke about how we can also let being stubborn get in our way and that affects our ability to move forward.  We spoke about how we can be contributing the reason to not do these things by how we talk to ourselves and that we tell ourselves that we are not strong enough and we are not capable.  We addressed effective ways to avoid procrastination and that we need to refute our arguments aggressively as to why we are putting things off.  We spoke about addressing specifically our reason for delay and developing fair arguments against delay and work to move forward       Patient's Goal:  1Dep. F33.0 M .  “Pt will articulate and or put in writing a plan of action for coping with uncomfortable feelings that get unbalanced for her to help gain confidence for discharge planning.”      Notes:  Pt participated in the group activity and discussion.  She shared that she was thinking the other day that she has never been truly alone.  She was raised in a big family and has always had someone with her.  She doesn't want to be afraid to be alone and is not sure how she would deal with  It.  However, she stated that she might be able to get used to it but doesn't want to try it anytime soon. She is also working on not isolating herself and attempting to get out more.     Status After Intervention:  Improved    Participation Level: Active Listener and Interactive    Participation Quality: Appropriate, Attentive, and Sharing      Speech:

## 2024-01-04 NOTE — BH NOTE
Group Therapy Note    Date: 1/4/2024    Group Start Time: 10:00 AM  Group End Time: 10:50 AM  Group Topic: Psychotherapy    [facility] BEHAVIOR[facility] OP    Brandie Montez LCSW        Group Therapy Note    Attendees: 11 scheduled    Focus of session was a discussion of the quote by Semja Benavidez which is “You can choose courage or you can choose comfort but you cannot choose both.”  We spoke about the value in this quote and the comfort can be something hard to recognize because it can be a rut or rock bottom and it is an unhealthy place for us.  We spoke about the courage that is need to overcome fears and worries to take on change and how we can overcome those fears.  We spoke about how we always have the option to take steps up rather than continue down into that pit.  Group members were asked to share how they can overcome fear and break out of that comfort zone.    Behavioral Health Daily Check In form revealed that pt is not experiencing SI/HI thoughts or plans, remaining abstinent from drugs and alcohol, and reports goal today of being positive and staying on medication      Patient's Goal:  1Dep. F33.0 L “Pt will be able to ask herself when she notices she is struggling 'is this focus or what I am doing helping me or hurting me' and work to change focus if necessary.”       Notes:  Pt shared that she had experiences with her daughter and her drug issues that she wishes she could go back and do over. She worries that she was not there enough or was somewhat intolerant at times.  She stated that she just wanted to help her and is still grieving her death.  She knows that the drugs took over her daughter actions and neither one of them had complete  had control of the situation. The group was supportive as she shared her grief    Status After Intervention:  Improved    Participation Level: Active Listener and Interactive    Participation Quality:

## 2024-01-09 ENCOUNTER — HOSPITAL ENCOUNTER (OUTPATIENT)
Facility: HOSPITAL | Age: 69
Setting detail: RECURRING SERIES
Discharge: HOME | End: 2024-01-12
Payer: MEDICARE

## 2024-01-09 PROCEDURE — 90853 GROUP PSYCHOTHERAPY: CPT

## 2024-01-09 NOTE — BH NOTE
Group Therapy Note    Date: 1/9/2024    Group Start Time: 10:00 AM  Group End Time: 10:50 AM  Group Topic: Psychotherapy    [facility] BEHAVIOR[facility] OP    Lacie, Abi S, LCSW        Group Therapy Note    Attendees:  10 scheduled  Focus of session was based on handout from [employer] on “Compassionate Thought Challenge Record.”  We spoke about how the handout can help patient to work towards replacing our automatic thoughts that lead to a more painful and negative feeling state and work towards a compassionate response.  We spoke about the result that can happen if we focus on compassionate responses to what our mind and body is leading us to.     Behavioral Health Daily Check In form revealed that patient is not experiencing SI/HI thoughts or plans, remaining abstinent from drugs and alcohol, and report's goal today of   \"staying on medications.\"      Patient's Goal:  1. Dep F 33.0  P.  Pt will verbalize the positive lessons she has from her past and be willing to write them down even if they came from very negative life experiences.”      Notes:  January participated in group with prompting.  She spoke about how she knows that she needs to work to \"do what is right for me.\"  She spoke about how she has come to terms with what she can and not do for her brother and how much that impacts her and her self esteem.  She can recognize that she needs to stand up for herself and be accepting of her limitations and she agreed that this is very important for her.      Status After Intervention:  Unchanged    Participation Level: Active Listener and Interactive    Participation Quality: Appropriate, Attentive, Sharing, and Supportive      Speech:  normal      Thought Process/Content: Logical  Linear      Affective Functioning: Congruent      Mood: anxious      Level of consciousness:  Alert, Oriented x4, and Attentive      Response to Learning: Able to

## 2024-01-09 NOTE — BH NOTE
Group Therapy Note    Date: 1/9/2024    Group Start Time: 11:00 AM  Group End Time: 11:50 AM  Group Topic: Psychotherapy    [facility] BEHAVIOR[facility] OP    Brandie Montez, SHELLI        Group Therapy Note    Attendees: 10 Scheduled    Focus of session was on an article called “Fear is Your Only Competitor.”  The article spoke about valuable steps to overcome fear and how fear can build based on our choices. I shared how “fear feeds on inaction” and how the solution to that is to do something, anything to get started.  We spoke about what is currently fueling our stress and worry and what can be done to start to tackle fears rather than let them build up. I also shared how “fear feeds in indecision” and how making a decision can help us break down fears and reduce our stress level.         Patient's Goal:  1Dep. F33.0 O We will continue to work on gaining insight.  “pt will work on self-forgiveness for her past mistakes, especially with her children, and work towards acceptance of her son's perspective at this time.”      Notes:  Pt participated in the group activity and discussion.  She stated she was doing well today and continued to observe and be attentive in group.  Pt would make supportive comments to others at times     Status After Intervention:  Improved    Participation Level: Active Listener and Interactive    Participation Quality: Appropriate, Attentive, and Sharing      Speech:  normal      Thought Process/Content: Logical      Affective Functioning: Congruent      Mood: bright      Level of consciousness:  Alert, Oriented x4, and Attentive      Response to Learning: Able to verbalize current knowledge/experience, Able to verbalize/acknowledge new learning, Able to retain information, and Capable of insight      Modes of Intervention: Education, Support, Socialization, and Exploration      Discipline Responsible: Social

## 2024-01-11 ENCOUNTER — HOSPITAL ENCOUNTER (OUTPATIENT)
Facility: HOSPITAL | Age: 69
Setting detail: RECURRING SERIES
Discharge: HOME | End: 2024-01-14
Payer: MEDICARE

## 2024-01-11 PROCEDURE — 90853 GROUP PSYCHOTHERAPY: CPT

## 2024-01-11 NOTE — BH NOTE
Group Therapy Note    Date: 1/11/2024    Group Start Time: 10:00 AM  Group End Time: 10:50 AM  Group Topic: Psychotherapy    [facility] BE[facility]IOR[facility]    Brandie Montez LCSW        Group Therapy Note    Attendees: 10 scheduled    Focus of session was on reasons why it is vital to allow ourselves to feel feelings and process them.  We spoke about how we are taught young that we should avoid feelings such as anger and sadness and how we live in a culture that attempts to hide from feelings. We spoke about the benefit of “leaning into” painful feelings.  We spoke about how we numb good feelings when we learn to numb negative ones, when we struggle with emotions, this often leads to more suffering, and processing and experiencing your feelings is part of having a full life.  We spoke about identifying and processing emotions is a sign of strength and what group members are willing to do right now to develop more abilities to feel and process emotions.       Behavioral Health Daily Check In form revealed that pt is not experiencing SI/HI thoughts or plans, remaining abstinent from drugs and alcohol, and reports goal today to keep taking her meds.   Patient's Goal:  1Dep. F33.0 E “Pt will create a schedule or routine including possible hobbies, activities, and connection with others to help her make progress to staying out of her room all day    Notes:  Pt shared that she had a really good day yesterday.  She woke up early and started cleaning and organizing.  She did this all day and her son even commented by saying : I think I've gotten my old mom back\"  She stated she was proud of herself and felt good.  She has been taking her medications regularly and also taking better care of herself.     Status After Intervention:  Improved    Participation Level: Active Listener and Interactive    Participation Quality: Appropriate, Attentive, Sharing, and

## 2024-01-11 NOTE — BH NOTE
Group Therapy Note    Date: 1/11/2024    Group Start Time: 11:00 AM  Group End Time: 11:50 AM  Group Topic: Psychotherapy    [facility] BEHAVIOR[facility] TH OP    Brandie Montez LCSW        Group Therapy Note    Attendees: 10  scheduled    Focus of session was on the types of people that we come across in our lives, be it strangers or family, which drains us of our energy and our positive thoughts.  We spoke about the importance of learning how to combat and manage these difficult people and that when we are struggling with low self esteem, depression, fears of speaking up and protecting ourselves, and having a need to please others, we are much more vulnerable to letting these people drag us down.  We spoke about how we have controllers, self absorbers, chronic victims, and criticizers.  We spoke about effective ways to cope with these emotionally toxic people in our lives to be able to maintain our progress.        Patient's Goal:   1Dep. F33.0 L .   “Pt will be able to ask herself when she notices she is struggling 'is this focus or what I am doing helping me or hurting me' and work to change focus if necessary.”       Notes:  Pt participated in the group activity and discussion.  She shared about having her first panic attack and how she was caught off guard and did not know what was happening. No one explained anxiety to her at that time and she ended up quitting her job and isolating.  She can now look back at the many events that had impacted her life during that time and understand her anxiety better through group discussion      Status After Intervention:  Improved    Participation Level: Active Listener    Participation Quality: Appropriate, Attentive, and Sharing      Speech:  normal      Thought Process/Content: Logical      Affective Functioning: Congruent      Mood: euthymic      Level of consciousness:  Alert and Attentive      Response to

## 2024-01-16 ENCOUNTER — HOSPITAL ENCOUNTER (OUTPATIENT)
Facility: HOSPITAL | Age: 69
Setting detail: RECURRING SERIES
Discharge: HOME | End: 2024-01-19
Payer: MEDICARE

## 2024-01-16 PROCEDURE — 90853 GROUP PSYCHOTHERAPY: CPT

## 2024-01-16 NOTE — BH NOTE
Group Therapy Note    Date: 1/16/2024    Group Start Time: 10:00 AM  Group End Time: 10:50 AM  Group Topic: Psychotherapy    [facility] BEHAVIOR[facility]    Abi Medellin, LCSW        Group Therapy Note    Attendees: 10 scheduled    Focus of session was on emotional intelligence and the skills that we can build to increase emotional strength and competence.  We spoke about the components of EI (Sherry, 1997) and they include the ability to identify and discuss emotions, ability to regulate and control emotions, ability to read, label, and interpret other people's emotions, and the ability to sustain healthy interpersonal relationships.  I addressed with group members 5 skills that they can focus on to increase EI and they include the ability to quickly reduce stress, to recognize the emotions that is being felt, the ability to connect with others using non verbal's that are healthy, the ability to use humor to deal with challenges, and the ability to resolve conflicts positively and with confidence.  Group members discussed which skill they can practice and improve upon in this upcoming week.     Behavioral Health Daily Check In form revealed that patient is not experiencing SI/HI thoughts or plans, remaining abstinent from drugs and alcohol, and report's goal today of   \"taking medications.\"    Patient's Goal:  1. Dep F 33.0  L.  “Pt will be able to ask herself when she notices she is struggling 'is this focus or what I am doing helping me or hurting me' and work to change focus if necessary    Notes:  January participated in group with prompting.  She was quiet but she was engaged with questions.  She spoke about her struggle with saying no and that she still has not written her list for what she can and what she cannot do when her brother asks for her help and assistance.  She said \"I can keep it all in my head but I encouraged her to write things

## 2024-01-16 NOTE — BH NOTE
Group Therapy Note    Date: 1/16/2024    Group Start Time: 12:00 PM  Group End Time: 12:50 PM  Group Topic: Psychotherapy    [facility]IOR[facility]    Lacie, Abi [facility]AN[facility]        Group Therapy Note    Attendees:  10 scheduled    Focus of session was based on information from “[employer]” on an overview of how to develop psychological flexibility.  We discussed how flexibility can be a good characteristic and ability overall and we spoke about how psychological flexibility means to work on skills of being present, allowing it to be just as it is, unhooking from difficult thoughts, noticing myself as an observer, remembering or clarifying your values, and to do what is important to you and live by your values.  We spoke about how inflexibility has caused group members harm and what they are willing to work on changing.       Patient's Goal:  1. Dep F 33.0  P.  “Pt will verbalize the positive lessons she has from her past and be willing to write them down even if they came from very negative life experiences.    Notes:  January participated in group with ease.  She spoke about how she has been thinking a lot about her daughter and the choices she made and how she lost her to an overdose.  We spoke about the impact of the emotions she has had and she recognizes how it would be helpful to her to stay focused on being an observer of herself and allow herself to feel what she is feeling.     Status After Intervention:  Unchanged    Participation Level: Active Listener and Interactive    Participation Quality: Appropriate, Attentive, Sharing, and Supportive      Speech:  normal      Thought Process/Content: Logical  Linear      Affective Functioning: Congruent      Mood: anxious and depressed      Level of consciousness:  Alert, Oriented x4, and Attentive      Response to Learning: Able to verbalize current knowledge/experience, Able to retain

## 2024-01-16 NOTE — BH NOTE
Group Therapy Note    Date: 2024    Group Start Time: 11:00 AM  Group End Time: 11:50 AM  Group Topic: Primary Group    [facility]    Brandie Montez LCSW        Group Therapy Note    Attendees: 10 scheduled    Focus of session was based on information from [employer] on tips for processing changes.(Dealing with Change)  We spoke about our typical reactions to change and how we cope or don't cope with it.  We spoke about what changes are coming up and what can be exciting about it but also scary.  We spoke about the tips from the handout which included focusing on what you can control, writing out feelings, keeping up with self care, finding support, tune into the good, creating plans, and thinking of your strength and resilience.  We spoke about how journaling worries and fears can help us then plan for what actions we can take.  Group members worked through a change that is happening or that they can plan for to help with the transition and not lead to setbacks in their recovery.        Patient's Goal:  1Dep. F33.0 Q “Pt will ask the question 'is there anything I can do right now to change the past or to positively influence the future' and, if yes, take action on what she can do and report to group.”       Notes:  Pt participated in the group activity and discussion. She was supportive of another member who is dealing with grief issues  and attending a family .  Pt acknowledged that she also was from a large family.  She regrets not spending more time with her sisters and encouraged pt to go to the  in support or hers. The other pt was receptive to her support    Status After Intervention:  Improved    Participation Level: Active Listener and Interactive    Participation Quality: Appropriate, Attentive, Sharing, and Supportive      Speech:  normal      Thought Process/Content: Logical      Affective

## 2024-01-18 ENCOUNTER — HOSPITAL ENCOUNTER (OUTPATIENT)
Facility: HOSPITAL | Age: 69
Setting detail: RECURRING SERIES
Discharge: HOME | End: 2024-01-21
Payer: MEDICARE

## 2024-01-18 PROCEDURE — 90853 GROUP PSYCHOTHERAPY: CPT

## 2024-01-18 NOTE — BH NOTE
Group Therapy Note    Date: 1/18/2024    Group Start Time: 10:00 AM  Group End Time: 10:50 AM  Group Topic: Psychotherapy    [facility] BEHAVIOR[facility] TH OP    Brandie Montez LCSW        Group Therapy Note    Attendees: 11 Scheduled    Focus of session was based on information from [employer] on tips for processing changes (Self Care)We spoke about our typical reactions to change and how we cope or don't cope with it.  We spoke about what changes are coming up and what can be exciting about it but also scary.  We spoke about the tips from the handout which included focusing on what you can control, writing out feelings, keeping up with self care, finding support, tune into the good, creating plans, and thinking of your strength and resilience.  We spoke about how journaling worries and fears can help us then plan for what actions we can take.  Group members worked through a change that is happening or that they can plan for to help with the transition and not lead to setbacks in their recovery.       Behavioral Health Daily Check In form revealed that pt is not experiencing SI/HI thoughts or plans, remaining abstinent from drugs and alcohol, and reports goal today to stay on mediaction even though it is making me gain weight  Patient's Goal:  1Dep. F33.0 M “Pt will articulate and or put in writing a plan of action for coping with uncomfortable feelings that get unbalanced for her to help gain confidence for discharge planning.”      Notes:  Pt listened tot he group discussion and concerns of others.  She was supportive of a new member by introducing herself and sharing with her about her group experiences.  Pt was also able to share similar feelings and an understanding of the new pt's anxiety.     Status After Intervention:  Improved    Participation Level: Active Listener and Interactive    Participation Quality: Appropriate, Attentive,

## 2024-01-18 NOTE — BH NOTE
Group Therapy Note    Date: 1/18/2024    Group Start Time: 12:00 PM  Group End Time: 12:50 PM  Group Topic: Psychotherapy    [facility] BEHAVIORAL TH OP    Lacie, Abi BATEMAN[facility]        Group Therapy Note    Attendees:  11 scheduled    Focus of session was based on handout titled “what is the [place] of Control?”  We spoke about the concept of worry and the analogy of worrying is like sitting in a rocking chair, you use a lot of energy and  it gives you something to do but you don't get anywhere.”  We spoke about the differences in being a reactive person versus a proactive person and how proactive people behave and act.  We discussed strategies that will help us get to challenge what we are focusing on and return to give our energy to what he can control about situations.        Patient's Goal:  1. Dep F 33.0  E.  “Pt will create a schedule or routine including possible hobbies, activities, and connection with others to help her make progress to staying out of her room all day.”      Notes:  January participated in group actively.  She spoke about how she can benefit from focusing on shifting her focus to what she can do to make something better.  She spoke about how she does a lot of distracting for herself and we spoke about how she can also redirect herself to do something to problem solve.      Status After Intervention:  Unchanged    Participation Level: Active Listener and Interactive    Participation Quality: Appropriate, Attentive, Sharing, and Supportive      Speech:  normal      Thought Process/Content: Logical  Linear      Affective Functioning: Congruent      Mood: anxious      Level of consciousness:  Alert, Oriented x4, and Attentive      Response to Learning: Able to verbalize current knowledge/experience, Able to retain information, and Able to change behavior      Endings: None Reported    Modes of Intervention: Education, Support,

## 2024-01-18 NOTE — BH NOTE
Group Therapy Note    Date: 1/18/2024    Group Start Time: 11:00 AM  Group End Time: 11:50 AM  Group Topic: Psychotherapy    [facility] BEHAVIORAL TH OP    Lacie, Abi BATEMAN, LCSW        Group Therapy Note    Attendees:  11 scheduled    Focus of session was based on information from “Psychology Tools” on handout titled “Health Anxiety Formulation.”  We spoke about what health anxiety is and how it is significant preoccupation with having or acquiring a serious illness and how it can impact a person's day to day life.  We discussed how to take it from a cognitive behavioral approach and being aware of the triggers, whether it is external events or internal symptoms and how to process our thoughts and feelings.  We spoke about how this can help us relax our bodies and minds and we spoke about the benefits of that practice.         Patient's Goal:  1. Dep F 33.0  N.  .  “Pt will show improvement in being aware of others and not trying to go in and fix situations but to listen and show empathy.”     Notes:  January participated in group with prompting.  She spoke a lot once engaged and she spoke about how she does worry in general about a lot of things.  She worries about getting lung cancer but still smokes.  She is worried that if she quits \"I will gain all this weight and I will get obese like my mother and not be able to do for me like she was.\" This writer encouraged her to think about exercise and she was very resistant to it and had a lot of reasons why that won't work for her.  We spoke about the immediate effects exercise of any form can have on a person's mood and attitude.      Status After Intervention:  Unchanged    Participation Level: Active Listener, Interactive, and Monopolizing    Participation Quality: Appropriate, Attentive, Sharing, and Supportive      Speech:  normal      Thought Process/Content: Logical  Linear      Affective

## 2024-01-25 ENCOUNTER — HOSPITAL ENCOUNTER (OUTPATIENT)
Facility: HOSPITAL | Age: 69
Setting detail: RECURRING SERIES
End: 2024-01-25
Payer: MEDICARE

## 2024-01-25 NOTE — BH NOTE
Group Therapy Note    Date: 1/25/2024    January could not drive in her car today and could not come to her scheduled treatment day.    Signature:  Abi Medellin LCSW

## 2024-01-31 ENCOUNTER — APPOINTMENT (OUTPATIENT)
Facility: HOSPITAL | Age: 69
End: 2024-01-31
Payer: MEDICARE

## 2024-01-31 PROCEDURE — 99214 OFFICE O/P EST MOD 30 MIN: CPT | Performed by: PSYCHIATRY & NEUROLOGY

## 2024-01-31 PROCEDURE — 90853 GROUP PSYCHOTHERAPY: CPT

## 2024-01-31 NOTE — PROGRESS NOTES
SOP PROGRESS NOTE    INTERVAL HISTORY/FINDINGS:  States she's been feeling \"much better\" since she started to increase the Lamictal, and that she's only up to 50 mg BID currently, but that her mood has been much brighter and quite stable. Her N/V functioning has been very good--sleeping well now, energy has improved and she's not anhedonic at all. No hypomanic periods noted or reported, despite the improved mood/affect. We further discussed her Dx and the likelihood she has more of a BPAD type II than just MDD alone. Tolerating the meds well--no rash, sedation, or further weight gain. Has had bad SE's with several SSRI's and was hypomanic with Wellbutrin, and Trintellix was ineffective.     MEDICATIONS:  Current Outpatient Medications   Medication Sig    lamoTRIgine (LAMICTAL) 100 MG tablet Take 1 tablet by mouth 2 times daily    KLOR-CON M10 10 MEQ extended release tablet TAKE 1 TABLET BY MOUTH EVERY DAY    levothyroxine (SYNTHROID) 150 MCG tablet TAKE 1 TABLET BY MOUTH EVERY DAY    lisinopril (PRINIVIL;ZESTRIL) 20 MG tablet TAKE 1 TABLET BY MOUTH EVERY DAY FOR HIGH BLOOD PRESSURE    hydroCHLOROthiazide (HYDRODIURIL) 12.5 MG tablet TAKE 1 TABLET BY MOUTH EVERY DAY FOR BLOOD PRESSURE    albuterol sulfate HFA (PROVENTIL;VENTOLIN;PROAIR) 108 (90 Base) MCG/ACT inhaler Inhale 2 puffs into the lungs every 6 hours as needed for Wheezing    ciclopirox (PENLAC) 8 % solution Apply topically nightly for 90 days to affected nails .    mirtazapine (REMERON) 45 MG tablet Take 1 tablet by mouth nightly    aspirin 81 MG chewable tablet Take 1 tablet by mouth daily    vitamin D (CHOLECALCIFEROL) 25 MCG (1000 UT) TABS tablet Take by mouth daily    ibuprofen (ADVIL;MOTRIN) 800 MG tablet Take 1 tablet by mouth    melatonin 5 MG TABS tablet Take by mouth     No current facility-administered medications for this encounter.     MENTAL STATUS UPDATE: (Positives in Bold)  Appearance:   Neat   Disheveled  Odiferous

## 2024-01-31 NOTE — BH NOTE
Group Therapy Note    Date: 1/31/2024    Group Start Time: 12:00 PM  Group End Time: 12:50 PM  Group Topic: Psychotherapy    [facility] BEHAVIORAL TH OP    Brandie Montez LCSW        Group Therapy Note    Attendees: 11 scheduled    Focus of session was based on article “Six Principles for Managing Impulses to Maximize Life Satisfaction and Success.”  We reviewed the definition of an impulse which is “an impelling force or a sudden wish or urge that prompts an unpremeditated act or feeling.”  We spoke about the definition of being impulsive which is characterized by actions based on sudden desires, whims, or inclinations rather than careful thought.”  We spoke about a method to try and control impulses and it is PURRRRS which stands for Pause, Use, Reflect, Reason, Respond, Revise, and Stabilize.  We spoke about what our impulsive behavior is and how going through these steps may help us to slow down and how may that impact the emotional consequences to be more positive rather than negative.  I shared the six principles from the article which included: know your risks, plan for your risks, count to 10… a lot, Be mindful, get corrective feedback, and remember that Devang was not built in a day.       Patient's Goal:  1Dep. F33.0 N  “Pt will show improvement in being aware of others and not trying to go in and fix situations but to listen and show empathy.”      Notes:  Pt participated in the group activity and discussion.  She shared with the group that it had been difficult for her to share her mental health needs with family and friends at times  She has been able to continue to gain self confidence and accept that this is part of who she is but not everything she is. Pt continues to take her medication on a regular basis and feeels she is making progress with that goal too      Status After Intervention:  Improved    Participation Level: Active

## 2024-01-31 NOTE — BH NOTE
Group Therapy Note    Date: 1/31/2024    Group Start Time: 10:00 AM  Group End Time: 10:50 AM  Group Topic: Psychotherapy    [facility] BEHAVIORAL TH OP    Brandie Montez, SHELLI        Group Therapy Note    Attendees: 11 scheduled    Focus of session was on information from the “4 A's of stress relief.”  I shared the idea of minor and major stress relievers include the first two A's of avoid and alter.  I shared that avoid means to just not deal with the needless stressors and make plans to avoid them whenever possible.  We spoke about how we need to learn to say no and focus on doing the priorities every day.  We also spoke about the need to alter situations to manage stress and work to change situations for the better.  We spoke about how these skills include asking others to change their behavior, communicating openly, managing time more effectively and setting limits in advance.  I asked group members to share their thoughts about which skills they can use to manage their stress more effectively.       Behavioral Health Daily Check In form revealed that pt is not experiencing SI/HI thoughts or plans, remaining abstinent from drugs and alcohol, and reports goal today taking medication and staying positive  Patient's Goal:  1Dep. F33.0 L Pt will be able to ask herself when she notices she is struggling 'is this focus or what I am doing helping me or hurting me' and work to change focus if necessary.”       Notes:  Pt shared that she has been trying to be patient which is difficult for her.  She has been without a car and this is the first time for her..  She doesn't want to loose her independence but is having some challenges in getting it fixed.  She was receptive to the group members support and strategies to assist her     Status After Intervention:  Improved    Participation Level: Active Listener and Interactive    Participation Quality:

## 2024-01-31 NOTE — BH NOTE
Group Therapy Note    Date: 1/31/2024    Group Start Time: 11:00 AM  Group End Time: 11:50 AM  Group Topic: Psychotherapy    [facility] BEHAVIORAL [facility] OP    Abi Medellin, LCSW        Group Therapy Note    Attendees:  11 scheduled    Focus of session was based on handout “Stress as a Stimulus for Change” from Positive Psychology.  We spoke about the benefit of knowing how our body is feeling in the fight or flight response of stress as a starting point to determine what can be done about the cause of current stresses.  We spoke about the different physical symptoms of stress and what patient experiences in flight or fight and how we can ask the following questions: What triggered the stress response, what can you do to change it, and are you resisting that change.  We spoke about why these questions can be of help and give us potential energy and power to make a positive change and decrease our stress.         Patient's Goal:  1. Dep F 33.0   Q.  Pt will ask the question 'is there anything I can do right now to change the past or to positively influence the future' and, if yes, take action on what she can do and report to group.    Notes:  January participated in group with prompting.  She spoke about how she knows that the medications she is now taking are really helping \"to not go overblown on issues.\"  She spoke about how she feels like she is a lot calmer when under stress and she is happy to feel and see that progress.      Status After Intervention:  Improved    Participation Level: Active Listener and Interactive    Participation Quality: Appropriate, Attentive, Sharing, and Supportive      Speech:  normal      Thought Process/Content: Logical  Linear      Affective Functioning: Congruent      Mood: euthymic      Level of consciousness:  Alert, Oriented x4, Attentive, and Preoccupied      Response to Learning: Able to verbalize current

## 2024-02-01 ENCOUNTER — HOSPITAL ENCOUNTER (OUTPATIENT)
Facility: HOSPITAL | Age: 69
Setting detail: RECURRING SERIES
Discharge: HOME | End: 2024-02-04
Payer: MEDICARE

## 2024-02-01 PROCEDURE — 90853 GROUP PSYCHOTHERAPY: CPT

## 2024-02-01 NOTE — BH NOTE
Group Therapy Note    Date: 2/1/2024    Group Start Time: 11:00 AM  Group End Time: 11:50 AM  Group Topic: Psychotherapy    [facility] BEHAVIORAL TH OP    Lacie, Abi BATEMAN, LCSW        Group Therapy Note    Attendees: 9 scheduled    Focus of session was based on information from [employer] magazine article “How to Get Healthier Dopamine Highs.”  We spoke about what happens to our bodies and brains when our dopamine highs are creating by unhealthy habits.  We spoke about how we can work to keep track of our highs and see patterns of possible addictive habits that are not serving us well. We spoke about the impact on our well being if we focus on finding dopamine from purely healthy outlets and that these “highs”, such as the one we get from completing a tough task, will not lead to crashes mentally and physically.         Patient's Goal:  1. Dep F 33.0  M.  Pt will articulate and or put in writing a plan of action for coping with uncomfortable feelings that get unbalanced for her to help gain confidence for discharge planning.”    Notes:  January participated in group with prompting.  She spoke about how she is aware of how much her love for others has been a natural high for her.  She spoke about how she has tried to make better choices in terms of diet and exercise.  We spoke about how she can think about her relationship with her partner who passed away and how she \"just loved being in the same room with him.\"  She spoke about how she gets comfort and a \"high thinking of our love for each other.\"     Status After Intervention:  Improved    Participation Level: Active Listener and Interactive    Participation Quality: Appropriate, Attentive, Sharing, and Supportive      Speech:  normal      Thought Process/Content: Logical  Linear      Affective Functioning: Congruent      Mood: anxious      Level of consciousness:  Alert, Oriented x4, and

## 2024-02-01 NOTE — BH NOTE
Group Therapy Note    Date: 2/1/2024    Group Start Time: 10:00 AM  Group End Time: 10:50 AM  Group Topic: Psychotherapy    [facility] BEHAVIORAL [facility] OP    Abi Medellin, LCSW        Group Therapy Note    Attendees: 9 scheduled    Focus of session was on identifying the rights that we all have as human beings and the need to keep these in mind when we may be struggling with communicating and relating to other people in our lives.  We spoke about how when self esteem is low and anxiety and depression may be high, we have more internal conflict over what we feel comfortable expressing to others and we run the risk of not saying anything out of fear of rejection or being seen as selfish or wrong.  We discussed the core rights that we have which include the right to say no, the right to negotiate for change, the right to ask for help and support, the right to be the final  of our own beliefs, and the right to our own experiences regardless of its different from other people's experiences.   Behavioral Health Daily Check In form revealed that patient is not experiencing SI/HI thoughts or plans, remaining abstinent from drugs and alcohol, and report's goal today of  \"keep taking medications.\"      Patient's Goal:  1. Dep F 33.0  e.  “Pt will create a schedule or routine including possible hobbies, activities, and connection with others to help her make progress to staying out of her room all day.”      Notes:  January participated in group with prompting.  She spoke about how she is doing better with saying no as \"I am just too old to say yes to everything.\"  She spoke about how she has been able to think more about how she can better express her opinion and she does recognize that \"I sometimes overdo it and say too much.\"  She spoke about how she is making progress with her car and she is relieved about that.      Status After Intervention:

## 2024-02-05 NOTE — BH NOTE
[facility]  Group Therapy  Treatment Plan Review    TREATMENT PLAN REVIEW REVIEW DATE: 1/31/2024     summary of Ongoing issues with Symptoms/Functional Impairments Indicating Need for Continued Stay:  January has reported that she is doing better overall and believes that the medications are helping her to manage her mood and her reactions.  SHe has been having the setback of her car being under recall and she does not have transportation at this time.  SHe has been managign the stress of figuring out what to do well and she is gaining more confidence slowly in herself.  We will plan to transition her to one day a week at the end of February as she works through this car issue and the increased isolation.        TREATMENT & DISCHARGE PLANNING CHANGES    Modality or Intervention  Changes We will continue to have pt attend two days a week, three groups a day and then transition to one day a week at the end of February.     Psychotropic Medication Changes No changes since last review.     Discharge  Planning or Target Date  Changes 3/31/2024   New Issues Car has been recalled, without transportation currently.       TREATMENT TEAM SIGNATURE / DATE   I have participated in the development of this plan of treatment and agreed to its implementation.   Client Signature PRINTED Name                       January Bermeo Date & Time       Family / Legal Representative Signature (If Applicable) PRINTED Name & Relationship     Date & Time   Therapist Signature PRINTED Name & Credential                            So Medellin LCSW Date & Time       Therapist Signature PRINTED Name & Credential                         Genevieve Montez LCSW   Date & Time       Other Signature PRINTED Name & Credential     Date & Time   Other Signature PRINTED Name & Credential or Relationship     Date & Time       PHYSICIAN CERTIFICATION OF THE LEVEL OF CARE:  I certify that these outpatient behavioral health

## 2024-02-06 ENCOUNTER — HOSPITAL ENCOUNTER (OUTPATIENT)
Facility: HOSPITAL | Age: 69
Setting detail: RECURRING SERIES
Discharge: HOME | End: 2024-02-09
Payer: MEDICARE

## 2024-02-06 PROCEDURE — 90853 GROUP PSYCHOTHERAPY: CPT

## 2024-02-06 NOTE — BH NOTE
Group Therapy Note    Date: 2/6/2024    Group Start Time: 12:00 PM  Group End Time: 12:50 PM  Group Topic: Psychotherapy    [facility] BEHAVIORAL TH OP    [facility]i [facility]        Group Therapy Note    Attendees:  11 scheduled    Focus of session was based on handout from [employer] about exposure therapy and how to implement practicing exposure to what we fear to help manage anxiety symptoms.  We spoke about why exposing ourselves gradually can be effective and the importance of processing exactly what we are afraid of, what do I fear will happen, as well as how likely our worst fears are to happen.  We spoke about what can be learned and worked to process patients fears and anxieties that are present right now and how they can work to implement this process.         Patient's Goal:  1. Dep F 33.0  P.  .  “Pt will verbalize the positive lessons she has from her past and be willing to write them down even if they came from very negative life experiences.    Notes:  January participated in group with prompting.  She spoke about how she knows that she is struggling with anxiety at times but she can see how she has been able to overcome anxiety triggers in the past. She was very supportive of peers in the group who were just learning to cope.      Status After Intervention:  Unchanged    Participation Level: Active Listener and Interactive    Participation Quality: Appropriate, Attentive, Sharing, and Supportive      Speech:  normal      Thought Process/Content: Logical  Linear      Affective Functioning: Congruent      Mood: anxious and depressed      Level of consciousness:  Alert, Oriented x4, and Attentive      Response to Learning: Able to verbalize current knowledge/experience, Able to retain information, Capable of insight, and Progressing to goal      Endings: None Reported    Modes of Intervention: Education, Support, Socialization,

## 2024-02-06 NOTE — BH NOTE
Group Therapy Note    Date: 2/6/2024    Group Start Time: 11:00 AM  Group End Time: 11:50 AM  Group Topic: Psychotherapy    [facility] BEHAVIORAL TH OP    Brandie Montez LCSW        Group Therapy Note    Attendees: 10 scheduled    Focus of session was on article titled “Developing a Positive Mental Attitude.”   We spoke about what needs to happen in order to develop a “PMA” and that includes changing the language to be more empowering and how simple changes to the words we use can impact our attitude.  We spoke about the skill of shifting your physiology and how we can focus on breathing and tension in the body and how our body's physiology changes based on what emotions that we are feeling.  We spoke about making self to self comparisons and how we can help ourselves more when we focus on developing a stronger self.  We discussed cultivating present moment awareness and asking effective questions such as “what's great about this or what opportunities exist here?”  We spoke about what skills group members can develop       Patient's Goal:  1Dep. F33.0 M “Pt will articulate and or put in writing a plan of action for coping with uncomfortable feelings that get unbalanced for her to help gain confidence for discharge planning.”      Notes:  Pt participated in the group activity and discussion.  She followed along with the reading of how to stay positive in a negative situation.  She shared many thoughts and feelings related to her daughters death and the circumstance prior.  She feels there is more she could have done but also knows her daughter was dealing with addiction. She would like to use the strategies in the future when she is feeling negative     Status After Intervention:  Improved    Participation Level: Active Listener and Interactive    Participation Quality: Appropriate, Attentive, and Sharing      Speech:  normal      Thought

## 2024-02-06 NOTE — BH NOTE
Group Therapy Note    Date: 2/6/2024    Group Start Time: 10:00 AM  Group End Time: 10:50 AM  Group Topic: Psychotherapy    [facility] BEHAVIORAL TH OP    Abi Medellin, LCSW        Group Therapy Note    Attendees: 10 scheduled    Focus of session was a review of the weekend and helping group members see their wins that they can celebrate from the past several days.  We spoke about the things that occurred and choices that they had in front of them and helping everyone to see the positive impact that they had on their well being.  We also spoke about what may have been the setbacks and how they coped and what they can plan to do for the future to help build on relapse prevention.       Behavioral Health Daily Check In form revealed that patient is not experiencing SI/HI thoughts or plans, remaining abstinent from drugs and alcohol, and report's goal today of  \"staying on my medications.\"     Patient's Goal:  1. Dep F 33.0  L.  “Pt will be able to ask herself when she notices she is struggling 'is this focus or what I am doing helping me or hurting me' and work to change focus if necessary.    Notes:  January participated in group with prompting.  She spoke about how she is feeling better when she comes here and is able to get help and give help. She began to get agitated when she started to talk about her car and that the dealer is now telling her that it will cost her $1100.  We spoke about what she can do and that she will ask her brother to call and get some further information because \"I am too agitated now.\"      Status After Intervention:  Unchanged    Participation Level: Active Listener and Interactive    Participation Quality: Appropriate, Attentive, Sharing, and Supportive      Speech:  normal      Thought Process/Content: Logical  Linear      Affective Functioning: Congruent      Mood: anxious and depressed      Level of consciousness:

## 2024-02-08 ENCOUNTER — HOSPITAL ENCOUNTER (OUTPATIENT)
Facility: HOSPITAL | Age: 69
Setting detail: RECURRING SERIES
Discharge: HOME | End: 2024-02-11
Payer: MEDICARE

## 2024-02-08 PROCEDURE — 90853 GROUP PSYCHOTHERAPY: CPT

## 2024-02-08 NOTE — BH NOTE
Group Therapy Note    Date: 2/8/2024    Group Start Time: 11:00 AM  Group End Time: 11:50 AM  Group Topic: Psychotherapy    [facility] BEHAVIORAL TH OP    Brandie Montez, SHELLI        Group Therapy Note    Attendees: 11 scheduled    Focus of session was based on the quote of “Respect yourself enough to walk away from anything that no longer serves you, grows you, or makes you happy as well as the quote of “You will find it necessary to let things go; simply for the reason that they are heavy.”  Group members were asked to share what they need to, or who they need to walk away from for their own recovery.  We spoke about the struggles of letting go and how the thoughts or emotions tend to come back.  I spoke with group members that “letting go” does not mean we won't have to work on letting it go over and over and that it is okay to pop back in our minds and hearts.  I asked group members what or who they need to walk away from starting today.         Patient's Goal:  1Dep. F33.0 R “Pt will verbalize the impact that she notices on her mood and her functioning when she stops her medications and how she can challenge those urges.”       Notes:  Pt participated in the group activity and followed along with the discussion.  She shared that she feels that she is starting to get into a helpful routine.  She now goes to bed after taking her medicine around 9 or 9:30 pm. She is sleeping better and through the night now which has been helpful to her mentally and physically     Status After Intervention:  Improved    Participation Level: Active Listener and Interactive    Participation Quality: Appropriate, Attentive, Sharing, and Supportive      Speech:  normal      Thought Process/Content: Logical      Affective Functioning: Congruent      Mood: euthymic      Level of consciousness:  Alert, Oriented x4, and Attentive      Response to Learning: Able to

## 2024-02-08 NOTE — BH NOTE
Group Therapy Note    Date: 2/8/2024    Group Start Time: 12:00 PM  Group End Time: 12:50 PM  Group Topic: Psychotherapy    [facility] BEHAVIORAL TH OP    Lacie[facility] Abi S[facility]        Group Therapy Note    Attendees: 11. Scheduled    Focus of session was based on handout “Listen to Your Emotions.”  We spoke about different painful emotions that can be common and difficult to process including bitterness, resentment, anger, disappointment, guilt, shame, and anxiety.  We spoke about the ideas in the handout of what these emotions are telling you may be bothering you and how to start to work through it.  We spoke about what patient feels is an emotion that keeps coming back and they can focus on how they can work though it or what that emotion is telling them.         Patient's Goal:  1. Dep F 33.0  S.  “Pt will identify a fear that is holding her back from a wanted change in her life and work to find reasons why she can pursue the change and manage the fear at the same time    Notes:  January participated in group with prompting.  She spoke about how she is aware that she has felt guilty about issues related to her brothers girlfriend and the fight that they had in the fall. She worries that her apology was not taken by her as she does not \"text me anymore.\"  She spoke about how she will work to check her guilt and see if there is something rational to apologize for.  We spoke about how she then needs to let go of her guilt if she can recognize she has not done anything wrong.      Status After Intervention:  Unchanged    Participation Level: Active Listener and Interactive    Participation Quality: Appropriate, Attentive, Sharing, and Supportive      Speech:  normal      Thought Process/Content: Logical  Linear      Affective Functioning: Congruent      Mood: anxious and irritable      Level of consciousness:  Alert, Oriented x4, and

## 2024-02-08 NOTE — BH NOTE
Group Therapy Note    Date: 2/8/2024    Group Start Time: 10:00 AM  Group End Time: 10:50 AM  Group Topic: Psychotherapy    [facility] BEHAVIORAL TH OP    Abi Medellin[facility]        Group Therapy Note    Attendees:  11 scheduled    Focus of session was based on article “7 Ways to Stop Complaining.”  We spoke about why we complain and how it affects us and our recovery.  We discussed personality traits and habits that we have that can make this a challenge that is difficult to overcome.  We went over the 7 strategies which include: learning to accept changes, thinking about how we are affecting others, reminding self to find something to be grateful for, don't associate with negative people/complainers, exercise, stop being judgmental, and lead by example. We spoke about how patient can incorporate these ideas to help progress recovery.   Behavioral Health Daily Check In form revealed that patient is not experiencing SI/HI thoughts or plans, remaining abstinent from drugs and alcohol, and report's goal today of  \"keep going to group and stay on medications.\"      Patient's Goal:  1. Dep F. 33.0  Q. “Pt will ask the question 'is there anything I can do right now to change the past or to positively influence the future' and, if yes, take action on what she can do and report to group.    Notes:  January participated in group with prompting.  She spoke about how she is getting her car fixed and her brother is loaning her the money.  She spoke about how she is frustrated that \"I will have no money now as I have to pay him back.\"  He spoke about how she can work on what is positive and what she has to feel grateful for.  She was able to hear that feedback.      Status After Intervention:  Unchanged    Participation Level: Active Listener and Interactive    Participation Quality: Appropriate, Attentive, Sharing, and Supportive      Speech:

## 2024-02-13 ENCOUNTER — HOSPITAL ENCOUNTER (OUTPATIENT)
Facility: HOSPITAL | Age: 69
Setting detail: RECURRING SERIES
Discharge: HOME | End: 2024-02-16
Payer: MEDICARE

## 2024-02-13 PROCEDURE — 90853 GROUP PSYCHOTHERAPY: CPT

## 2024-02-13 NOTE — BH NOTE
Group Therapy Note    Date: 2/13/2024    Group Start Time: 10:00 AM  Group End Time: 10:50 AM  Group Topic: Psychotherapy    [facility] BEHAVIORAL TH OP    Lacie, Abi [facility]AN[facility]        Group Therapy Note    Attendees:  11 scheduled    Focus of session was based on understanding all or nothing thinking and how it can be challenged and changed.  We spoke about the language that is used in this type of cognitive distortion and how to work to challenge these thoughts.  Group members were asked if they can recognize all or nothing thinking patterns that they are experiencing and how to manage those thoughts.  We spoke about the dos and don'ts of all or nothing thinking and what strategies group members can use to help challenge these distorted thoughts to help them maintain their mood and their wellbeing.   Behavioral Health Daily Check In form revealed that patient is not experiencing SI/HI thoughts or plans, remaining abstinent from drugs and alcohol, and report's goal today of   \"stay on my medications and keep coming to group.\"      Patient's Goal:  1. Dep F 33.0  L.  Pt will be able to ask herself when she notices she is struggling 'is this focus or what I am doing helping me or hurting me' and work to change focus if necessary.”    Notes:  January was active in group and she spoke about her decision to get her car towed back to home and \"try to have my son work on it.\"  She spoke about how she continues to think the dealership was \"taking me for all I am worth.\"  We spoke about how this could be a good example about black and white thinking and that there may be other ways to look at this situation and that there may be some legitimate things they are telling her she needs to get fixed. She continues to be easily agitated about it.     Status After Intervention:  Unchanged    Participation Level: Active Listener and Interactive    Participation

## 2024-02-13 NOTE — BH NOTE
Group Therapy Note    Date: 2/13/2024    Group Start Time: 1100 AM  Group End Time: 1150 AM  Group Topic: Psychotherapy    [facility] BEHAVIORAL TH OP    Lacie, Abi BATEMAN, LCSW        Group Therapy Note    Attendees:  11 scheduled    Focus of session was based on handout from [employer] “Grounding Techniques Menu” and when and how grounding can be a useful tool to manage anxiety.  We spoke about how grounding is effective in bringing our minds back to the present moment rather than focused on past issues or future worries.   We discussed the use of our five senses, using our body, distraction, reminders that we are safe, orienting ourselves, offer self-compassion, and using imagination in effective ways.         Patient's Goal:  1. Dep F.33.0  P.  “Pt will verbalize the positive lessons she has from her past and be willing to write them down even if they came from very negative life experiences.”    Notes:  January participated in group and she spoke about how she has been able to recognize how much she used to struggle with her thoughts and memories. She spoke about how she does see herself as making good improvements.  She was open to strategies and talking about what she can do to help herself.      Status After Intervention:  Improved    Participation Level: Active Listener and Interactive    Participation Quality: Appropriate, Attentive, and Sharing      Speech:  normal      Thought Process/Content: Logical  Linear      Affective Functioning: Congruent      Mood: anxious      Level of consciousness:  Oriented x4, Attentive, and Preoccupied      Response to Learning: Able to verbalize current knowledge/experience, Able to retain information, and Capable of insight      Endings: None Reported    Modes of Intervention: Education, Support, Socialization, and Exploration      Discipline Responsible: /Counselor      Signature:  Abi BATEMAN

## 2024-02-13 NOTE — BH NOTE
Group Therapy Note    Date: 2/13/2024    Group Start Time: 12:00 PM  Group End Time: 12:50 PM  Group Topic: Psychotherapy    [facility] BEHAVIORAL TH OP    Brandie Montez, SHELLI        Group Therapy Note    Attendees: 11 scheduled    Focus of session was on reasons why it is vital to allow ourselves to feel feelings and process them.  We spoke about how we are taught young that we should avoid feelings such as anger and sadness and how we live in a culture that attempts to hide from feelings. We spoke about the benefit of “leaning into” painful feelings.  We spoke about how we numb good feelings when we learn to numb negative ones, when we struggle with emotions, this often leads to more suffering, and processing and experiencing your feelings is part of having a full life.  We spoke about identifying and processing emotions is a sign of strength and what group members are willing to do right now to develop more abilities to feel and process emotions.         Patient's Goal:  1Dep. F33.0 Q .  “Pt will ask the question 'is there anything I can do right now to change the past or to positively influence the future' and, if yes, take action on what she can do and report to group.”      Notes:  Pt participated in the group activity and discussion.  She was supportive of other members as they shared behaviors that they were trying to stop because they now view them as unhealthy.  Pt shared her own experiences and ways that she has come to recognize her unhealthy behaviors and her attempts to find strategies to help her stop.      Status After Intervention:  Improved    Participation Level: Active Listener    Participation Quality: Appropriate      Speech:  normal      Thought Process/Content: Logical      Affective Functioning: Congruent      Mood: euthymic      Level of consciousness:  Alert, Oriented x4, and Attentive      Response to Learning: Able to

## 2024-02-15 ENCOUNTER — HOSPITAL ENCOUNTER (OUTPATIENT)
Facility: HOSPITAL | Age: 69
Setting detail: RECURRING SERIES
Discharge: HOME | End: 2024-02-18
Payer: MEDICARE

## 2024-02-15 PROCEDURE — 90853 GROUP PSYCHOTHERAPY: CPT

## 2024-02-15 NOTE — BH NOTE
Group Therapy Note    Date: 2/15/2024    Group Start Time: 12:00 PM  Group End Time: 12:50 PM  Group Topic: Psychotherapy    [facility] BEHAVIORAL TH OP    Lacie, Abi BATEMAN, LCSW        Group Therapy Note    Attendees:  10 scheduled    Focus of session was based on information about emotional maturity and some tips about how to become more aware of emotions and to take responsibility for them.  We spoke about how unhealthy/quick fix habits, regardless of what they are, if engaged in a daily or very consistent basis, will lead to numbing and avoidance of emotions and lead to emotional immaturity.  We spoke about how the process of breaking these habits can lead to intense feelings that patient may be overwhelmed by.  We spoke about the strategies to start to manage emotions are to: be aware of how you are feeling, stop and think, know yourself, and practice expressing your feelings in some effective way.         Patient's Goal:  1. Dep F 33.0  M.  Pt will articulate and or put in writing a plan of action for coping with uncomfortable feelings that get unbalanced for her to help gain confidence for discharge planning.”      Notes:  January participated in group well and she spoke about how she can agree with the statement about unhealthy habits can \"stunt us from being able to handle things.\"  She spoke about how she is trying to stay focused on her phone and being more aware of what she may be avoiding.  She spoke about how she is willing to try to journal even though \"it will be very hard\" but we spoke about some ways she can try it and it not be an overwhelming task.      Status After Intervention:  Unchanged    Participation Level: Active Listener and Interactive    Participation Quality: Appropriate, Attentive, and Sharing      Speech:  normal      Thought Process/Content: Logical  Linear      Affective Functioning: Congruent      Mood: anxious and

## 2024-02-15 NOTE — BH NOTE
Group Therapy Note    Date: 2/15/2024    Group Start Time: 10:00 AM  Group End Time: 10:50 AM  Group Topic: Psychotherapy    [facility] BEHAVIORAL TH OP    Abi Medellin, LCSW        Group Therapy Note    Attendees: 10 scheduled      Focus of session was based on handout from [employer] “Processing Big Changes.”  We spoke about struggles with changes, both big and small, and what our usual thought processes and emotions are that come along with changes.  We spoke about the tips for how to process change includes: focus on what you can control, write out feelings on paper, keep up your self-care, find support, tune into the good, make plans, and think of your strength. We spoke about big changes that are coming up or currently being experienced and what tips can be utilized to help build up confidence to be able to manage changes in life.     Behavioral Health Daily Check In form revealed that patient is not experiencing SI/HI thoughts or plans, remaining abstinent from drugs and alcohol, and report's goal today of  \"taking medications and trying to stay positive.\"      Patient's Goal:  1. Dep F 33.0  R.  “Pt will verbalize the impact that she notices on her mood and her functioning when she stops her medications and how she can challenge those urges    Notes:  January participated in group with prompting.  She spoke about how she has overcome a lot in her past and she has been through major changes and transitions including the loss of a child.  She spoke about how overwhelming the emotions can get once she thinks about what has changed in her life.  She is aware of her challenges and that she is making some steps forward to make changes.      Status After Intervention:  Unchanged    Participation Level: Active Listener, Interactive, and Minimal    Participation Quality: Appropriate, Attentive, Sharing, and Supportive      Speech:

## 2024-02-15 NOTE — BH NOTE
Group Therapy Note    Date: 2/15/2024    Group Start Time: 11:00 AM  Group End Time: 11:50 AM  Group Topic: Psychotherapy    [facility] BEHAVIORAL TH OP    Brandie Montez, CAMW        Group Therapy Note    Attendees: 10 scheduled      Focus of session was on procrastination and the possible reasons behind why we do it.  We spoke about fear and lack of confidence and esteem can play a major role in our reasons for doing this and we spoke about how we can also let being stubborn get in our way and that affects our ability to move forward.  We spoke about how we can be contributing the reason to not do these things by how we talk to ourselves and that we tell ourselves that we are not strong enough and we are not capable.  We addressed effective ways to avoid procrastination and that we need to refute our arguments aggressively as to why we are putting things off.  We spoke about addressing specifically our reason for delay and developing fair arguments against delay and work to move forward.        Patient's Goal:  1Dep. F33.0 S Pt will identify a fear that is holding her back from a wanted change in her life and work to find reasons why she can pursue the change and manage the fear at the same time.”      Notes:  Pt participated in the group discussion and engaged with the other members. Pt verbally attempts at times  to support other members but often has different opinions.  We were able to  discuss this in relation to group dynamics and in terms of each members individuality.     Status After Intervention:  Unchanged    Participation Level: Active Listener and Interactive    Participation Quality: Appropriate, Attentive, and Sharing      Speech:  normal      Thought Process/Content: Logical      Affective Functioning: Congruent      Mood: euthymic      Level of consciousness:  Alert and Attentive      Response to Learning: Able to verbalize

## 2024-02-20 ENCOUNTER — HOSPITAL ENCOUNTER (OUTPATIENT)
Facility: HOSPITAL | Age: 69
Setting detail: RECURRING SERIES
Discharge: HOME | End: 2024-02-23
Payer: MEDICARE

## 2024-02-20 PROCEDURE — 90853 GROUP PSYCHOTHERAPY: CPT

## 2024-02-20 NOTE — BH NOTE
Group Therapy Note    Date: 2/20/2024    Group Start Time: 11:00 AM  Group End Time: 11:50 AM  Group Topic: Psychotherapy    [facility] BEHAVIORAL TH OP    Brandie Montez, CAMW        Group Therapy Note    Attendees: 10 scheduled    Focus of session was on procrastination and the possible reasons behind why we do it.  We spoke about fear and lack of confidence and esteem can play a major role in our reasons for doing this and we spoke about how we can also let being stubborn get in our way and that affects our ability to move forward.  We spoke about how we can be contributing the reason to not do these things by how we talk to ourselves and that we tell ourselves that we are not strong enough and we are not capable.  We addressed effective ways to avoid procrastination and that we need to refute our arguments aggressively as to why we are putting things off.  We spoke about addressing specifically our reason for delay and developing fair arguments against delay and work to move forward.        Patient's Goal:  1Dep. F33.0 Q .  “Pt will ask the question 'is there anything I can do right now to change the past or to positively influence the future' and, if yes, take action on what she can do and report to group.”      Notes:  Pt listened to the group discussion and engaged with the other members.  She shared her experiences when she first attended group and what has changed in her life since.  She feels that at this time she possibly doesn't have any goals as she is feeling \"ok\"   about the way things are going.  She has excepted that she needs both medicine and counseling to help her manage her mental health needs     Status After Intervention:  Improved    Participation Level: Interactive    Participation Quality: Appropriate, Attentive, Sharing, and Supportive      Speech:  normal      Thought Process/Content: Logical      Affective Functioning:

## 2024-02-20 NOTE — BH NOTE
Group Therapy Note    Date: 2/20/2024    Group Start Time: 12:00 PM  Group End Time: 12:50 PM  Group Topic: Psychotherapy    [facility] BEHAVIORAL TH OP    Lacie[facility] Abi [facility]AN[facility]        Group Therapy Note    Attendees: 10 scheduled  Focus of session was on what executive function skills are and why they are important to develop, even as an adult.  We spoke about the skills which include impulse control, self-monitoring, time management, flexibility, organization, working memory, task initiation, and planning/prioritizing. We spoke about having intentional self-regulation meaning we can plan and be proactive in our responses rather than reactive.  We spoke about the most important take away is the fact that time and patience are the keys to working through stressful situations and how it allows us to consider other responses.         Patient's Goal:  1. Dep F33.1  P.  Pt will verbalize the positive lessons she has from her past and be willing to write them down even if they came from very negative life experiences    Notes:  January participated in group well in that she allowed others to talk about their thoughts largely without interrupting.  Patient was open to ideas and spoke about areas she struggles with and where she does have strong skills.  Patient was willing to talk about goals to increase the areas where skills can get stronger to help impact quality of life and improve functioning.      Status After Intervention:  Unchanged    Participation Level: Active Listener and Interactive    Participation Quality: Appropriate, Attentive, Sharing, and Supportive      Speech:  normal      Thought Process/Content: Logical  Linear      Affective Functioning: Congruent      Mood: anxious and depressed      Level of consciousness:  Alert, Oriented x4, Attentive, and Preoccupied      Response to Learning: Able to verbalize current knowledge/experience and

## 2024-02-20 NOTE — BH NOTE
Group Therapy Note    Date: 2/20/2024    Group Start Time: 10:00 AM  Group End Time: 10:50 AM  Group Topic: Psychotherapy    [facility] BEHAVIORAL [facility] OP    Lacie, Abi S, LCSW        Group Therapy Note    Attendees:  10 Scheduled  Focus of session was based on article titled “The Meaning of Fear in Three Acronyms: Anxiety Causes and Solutions” by Katie Gonsales.  We spoke about the acronym False Evidence Appearing Real and how we often get caught up in our anxiety because of “faulty thoughts.”  We spoke about the need to check on what we are thinking and if we are being accurate.  We spoke about how we can stay focused on an action plan to help relieve anxiety if our fears, both big and small, come to be true.     Behavioral Health Daily Check In form revealed that patient is not experiencing SI/HI thoughts or plans, remaining abstinent from drugs and alcohol, and report's goal today of  \"keep taking my medications as I am doing better on them.\"      Patient's Goal:  1. Dep F 33.0  R.  Pt will verbalize the impact that she notices on her mood and her functioning when she stops her medications and how she can challenge those urges.”    Notes:  January participated in group with prompting.  She was talkative at times and interrupting others on occasion.  She spoke about how she had some health issues but \"if its my time, its my time.\"  She spoke about how she is concerned about her son and that he can survive without her and we spoke about how she can try and take good care of herself in order to be here for him since she worries about him.  She was able to hear the ideas about better self care and why it is important.      Status After Intervention:  Unchanged    Participation Level: Active Listener and Interactive    Participation Quality: Appropriate, Attentive, Sharing, and Supportive      Speech:  normal      Thought Process/Content:

## 2024-02-22 ENCOUNTER — HOSPITAL ENCOUNTER (OUTPATIENT)
Facility: HOSPITAL | Age: 69
Setting detail: RECURRING SERIES
Discharge: HOME | End: 2024-02-25
Payer: MEDICARE

## 2024-02-22 PROCEDURE — 99214 OFFICE O/P EST MOD 30 MIN: CPT | Performed by: PSYCHIATRY & NEUROLOGY

## 2024-02-22 PROCEDURE — 90853 GROUP PSYCHOTHERAPY: CPT

## 2024-02-22 NOTE — BH NOTE
Group Therapy Note    Date: 2/22/2024    Group Start Time: 10:00 AM  Group End Time: 10:50 AM  Group Topic: Psychotherapy    [facility] BEHAVIORAL TH OP    Lacie, Abi BATEMAN, LCSW        Group Therapy Note    Attendees:  9 scheduled  Focus of session was based on handout from [employer] on “Taking Time for Yourself.”  We spoke about how we can often get caught up in our roles in life and become overwhelmed.  We spoke about how patient is feeling with balance right now among their different roles.  We spoke about the tips which include focusing on accepting self for who we are, to focus on the basics, find what makes us happy, practice mindfulness, make small goals, set boundaries, and to remember that we are not alone, and most people are working hard to manage right now.  We spoke about what tips can be effective for patient to feel more balanced.     Behavioral Health Daily Check In form revealed that patient is not experiencing SI/HI thoughts or plans, remaining abstinent from drugs and alcohol, and report's goal today of  \"stay out of the bed today and go outside.\"      Patient's Goal:  1. Dep F 33.0  M.  “Pt will articulate and or put in writing a plan of action for coping with uncomfortable feelings that get unbalanced for her to help gain confidence for discharge planning    Notes:  January participated in group with prompting.  She spoke about how she knows that she is doing somewhat better on being aware of the basics that she can do to help her improve her health.  She spoke about how she has been trying to be more focused on eating better and getting an appropriate amount of rest.      Status After Intervention:  Unchanged    Participation Level: Active Listener, Interactive, and Monopolizing    Participation Quality: Appropriate, Attentive, Sharing, and Supportive      Speech:  normal and pressured      Thought Process/Content:

## 2024-02-22 NOTE — BH NOTE
Group Therapy Note    Date: 2/22/2024    Group Start Time: 11:00 AM  Group End Time: 11:50 AM  Group Topic: Psychotherapy    [facility]AV[facility] OP    Brandie Montez LCSW        Group Therapy Note    Attendees: 9 scheduled    Focus of session was based on an article from the [employer] on “Building Your Resilience.”  We spoke about how we all face adversity and how there are strategies to help us survive it and even be stronger as a result.  We discussed the basics of resilience and that it is the ability to face major challenges with skills and strategies rather than being overcome by the stressors.  We spoke about the best strategies such as prioritize relationships and to have empathetic and supportive relationships in our lives and people who will encourage positive change.  We spoke about fostering wellness and to find purpose in some way.  We also spoke about the benefit of developing and embracing healthy thoughts.  We spoke also about seeking help as needed if these skills are not helping.  We discussed the strategies that can help group members right now to develop and implement.         Patient's Goal:  1Dep. F33.0 S “Pt will identify a fear that is holding her back from a wanted change in her life and work to find reasons why she can pursue the change and manage the fear at the same time.”      Notes:  Pt participated in the group discussion.  She was cooperative and attentive with the other members concerns. In an attempt to be supportive to another member she  shared experiences that she has gone through and activities that have helped her feel less anxious at times.     Status After Intervention:  Improved    Participation Level: Interactive    Participation Quality: Appropriate, Attentive, Sharing, and Supportive      Speech:  normal      Thought Process/Content: Logical      Affective Functioning:

## 2024-02-22 NOTE — BH NOTE
Group Therapy Note    Date: 2/22/2024    Group Start Time: 12:00 PM  Group End Time: 12:50 PM  Group Topic: Psychotherapy    [facility] BEHAVIORAL TH OP    Lacie, Abi BATEMAN, LCSW        Group Therapy Note    Attendees:  9 scheduled    Focus of session was based on understanding and accepting what are our “go to” unhealthy coping skills.  We spoke about the “skills” that are used when depression and anxiety are high and understanding the consequences of those skills.  We discussed how verbalizing and expressing those habits/skills as unhealthy can help us also verbalize how they negatively affect us.  Discussed with patient which unhealthy skill would have the most impact to try and stop.         Patient's Goal:  1. Dep F 33.0  L.  “Pt will be able to ask herself when she notices she is struggling 'is this focus or what I am doing helping me or hurting me' and work to change focus if necessary    Notes:  January participated in group with prompting.  She needed to be challenged to acknowledge how much of her time that she spends on her phone or watching 24 hour news.  She spoke about all the reasons why she has struggled with creating a new life for herself here. She spoke about how \"there is nothing to do here.\"  We continue to work to challenge her excuses and she does see how she does not have much of a life outside of her phone and games and watching TV.      Status After Intervention:  Unchanged    Participation Level: Active Listener and Interactive    Participation Quality: Appropriate, Attentive, Sharing, and Supportive      Speech:  normal and pressured      Thought Process/Content: Flight of ideas  Perseverating      Affective Functioning: Congruent      Mood: anxious      Level of consciousness:  Alert, Oriented x4, and Attentive      Response to Learning: Able to verbalize current knowledge/experience, Able to retain information, Capable of

## 2024-02-22 NOTE — PROGRESS NOTES
facility-administered medications for this encounter.       MENTAL STATUS UPDATE: (Positives in Bold)  Appearance:   Neat   Disheveled  Odiferous   Appropriate  Orientation:   Time  Place  Person  Speech:   Coherent  Pressured  Garbled/Slurred  Loud   Soft  Mute  Memory:   Intact  Impaired (Recent  Remote)--Mild  Severe  Mood:   Euthymic  Depressed  Anxious  Elated  Affect:    Appropriate  Inappropriate  Labile  Blunted  Flat  Thought Content:   Logical/Appropriate  Paranoid  Delusional  Illogical IOR  SI  HI  Thought Process:   Coherent/linear  Tangential  Loose/Disorganized   Hallucinations:   None  Auditory  Visual  Tactile  Olfactory  Gustatory  Insight:   Good  Fair  Impaired      Judgment:   Good  Fair  Impaired     CONTINUED NEED FOR TREATMENT: (Positives in Bold)  Continued psychiatric symptoms causing impairment in IDL  or functioning  Continued non-compliance with meds resulting in decompensation  High level of debilitation and symptoms with inadequate support  Continued need for structured care to adjust medications  Continued presence of debilitating symptoms  Continued presence of risk factors     CONTINUED NEED FOR GROUP PSYCHOTHERAPY TO ADDRESS THE FOLLOWING: (Positives in Bold)  Psychiatric symptom management       Psychiatric relapse prevention    Anxiety management      Self-esteem issues      Poor decision making       Recent decompensation       Safety issues      Anger management     Self care/ADL's     Med/treatment compliance      Impaired boundaries/relationships     Assertiveness      Grief/loss resolution       Guilt/shame issues     Realistic goal setting      DIAGNOSTIC IMPRESSION:  Possible Bipolar disorder type II--more likely now (F31.81).  Major Depression, recurrent, in partial remission now (F33.41).     PLAN:   Continue the Remeron to 45 mg QHS--has 1-2 months of refills (90 day)  Increase the Lamictal to 100 mg BID--has 4 months of refills (30 day). Up to 150 mg daily at

## 2024-02-27 ENCOUNTER — HOSPITAL ENCOUNTER (OUTPATIENT)
Facility: HOSPITAL | Age: 69
Setting detail: RECURRING SERIES
Discharge: HOME | End: 2024-03-01
Payer: MEDICARE

## 2024-02-27 PROCEDURE — 90853 GROUP PSYCHOTHERAPY: CPT

## 2024-02-27 NOTE — BH NOTE
Group Therapy Note    Date: 2/27/2024    Group Start Time: 11:00 AM  Group End Time: 11:50 AM  Group Topic: Psychotherapy    [facility] BEHAVIORAL [facility] OP    Abi Medellin, LCSW        Group Therapy Note    Attendees: 10 scheduled  Focus of session was on conflict resolution and strategies to help us keep calm in a conflict with others.  I shared how conflicts can increase the production of cortisol and adrenaline and how that prepares us to either take flight or fight.  I shared how the increase in hormones affects us when we need to be clear minded and aware, but those hormones can create a disorientation and confusion.  I shared with group some strategies to help us stay calm in a conflict and they included taking deep breaths which interferes with the production of these hormones, focusing on body and where we can work to relax it, listen carefully and ask open ended questions, lower our voices, and work to let go and agree to disagree with others.   We spoke about with whom group members can practice these strategies.       Patient's Goal:  1 Dep F.  33.0 R. Pt will verbalize the impact that she notices on her mood and her functioning when she stops her medications and how she can challenge those urges    Notes:  Gianna participated well in Group and she shared her long standing issues with. Not being able to resolve conflicts. She continues to report that she does not think her [place] history will let her be a good conflict resolution person. We spoke about how there is a lot of control she has and she has found an easy way to say that She is not capable of something. She spoke about how she can see that there are some good strategies here. And spoke about her interactions with her son. And how they could both focus on not talking over each other. And how that would be a good place to start.    Status After Intervention:

## 2024-02-27 NOTE — BH NOTE
Group Therapy Note    Date: 2/27/2024    Group Start Time: 10:00 AM  Group End Time: 10:50 AM  Group Topic: Psychotherapy    [facility] BEHAVIORAL TH OP    Brandie Montez LCSW        Group Therapy Note    Attendees: 10 attendees    Focus of session was a review of the weekend and successes that can be identified in recognizing and managing triggers to negative feeling states.  We spoke about what was done in managing triggers that led to success and what was done or not done that led to struggles in maintaining and getting through difficult moments.  We also spoke about goals to work towards this week and what is reasonable to accomplish by the end of the week.       Behavioral Health Daily Check In form revealed that pt is not experiencing SI/HI thoughts or plans, remaining abstinent from drugs and alcohol, and reports goal today being active and staying on my medication  Patient's Goal:  1Dep. F33.0 Q Pt will ask the question 'is there anything I can do right now to change the past or to positively influence the future' and, if yes, take action on what she can do and report to group.    Notes:  Pt participated in the group activity and discussion. Pt was supportive of another pt and shared her boundary setting strategies with the group. She explained how difficult she feels it is to do with your family especially with your children.  She found out she was not helping her son when doing things for him he could do for himself.     Status After Intervention:  Improved    Participation Level: Active Listener and Interactive    Participation Quality: Appropriate, Attentive, and Sharing      Speech:  normal      Thought Process/Content: Logical      Affective Functioning: Congruent      Mood: euthymic      Level of consciousness:  Alert and Attentive      Response to Learning: Able to verbalize current knowledge/experience, Able to

## 2024-02-27 NOTE — BH NOTE
Group Therapy Note    Date: 2/27/2024    Group Start Time: 12:00 PM  Group End Time: 12:50 PM  Group Topic: Psychotherapy    [facility] BEHAVIORAL TH OP    [facility]ellin[facility]        Group Therapy Note    Attendees: 10 scheduled  Focus of session was on handout “how to move through big emotions.” we discussed the strategies which include Pause and acknowledge the emotion, give it space and time and sense the emotion in your body.  Ask yourself where you are feeling it, name the emotion as best as you can. Remember that you are safe and all emotions pass.  Attribute the cause and ask yourself what caused it to appear or what happened right before it appeared.? Act in a way that will get you through the emotion with success and work till let it go when you feel ready. We spoke about what strategies can be effective for patient and what they will work on this week to develop these skills.       Patient's Goal:  1. Dep F 33.0 S.  “Pt will identify a fear that is holding her back from a wanted change in her life and work to find reasons why she can pursue the change and manage the fear at the same time.”    Notes:  Gianna participated well in Group again and spoke about how she started having panic attacks in the past and she could not recognize where they were coming from. We spoke about understanding that major changes in her life could have been the trigger to panic attacks that feel like they came out of the blue. She spoke about how she worries a lot that they will come back. And we discussed this handout and how she can use it to try to work through her emotions rather than reacting to them. And being scared of them.    Status After Intervention:  Unchanged    Participation Level: Active Listener and Interactive    Participation Quality: Appropriate, Attentive, Sharing, and Supportive      Speech:  normal      Thought Process/Content:

## 2024-02-29 ENCOUNTER — HOSPITAL ENCOUNTER (OUTPATIENT)
Facility: HOSPITAL | Age: 69
Setting detail: RECURRING SERIES
End: 2024-02-29
Payer: MEDICARE

## 2024-02-29 PROCEDURE — 90853 GROUP PSYCHOTHERAPY: CPT

## 2024-02-29 NOTE — BH NOTE
Group Therapy Note    Date: 2/29/2024    Group Start Time: 10:00 AM  Group End Time: 10:50 AM  Group Topic: Psychotherapy    [facility] BEHAVIORAL TH OP    Lacie, Abi BATEMAN, LCSW        Group Therapy Note    Attendees: 10 Scheduled  Focus of session was based on information from [employer] and we discussed the Vitality vs Suffering Diary.  We spoke about the purpose of the diary and how it can help increase our mindfulness of the moment and the choices we have in front of us.  We spoke about what vitality and suffering mean and how we can keep track of our actions and work to maintain healthy actions that further our recovery.     Behavioral Health Daily Check In form revealed that patient is not experiencing SI/HI thoughts or plans, remaining abstinent from drugs and alcohol, and report's goal today of  \"get outside today instead of staying in my room.\"       Patient's Goal:   1. DEP F 33.0 L. “Pt will be able to ask herself when she notices she is struggling 'is this focus or what I am doing helping me or hurting me' and work to change focus if necessary.    Notes:  January participated in group with prompting.  She spoke about how she can recognize how she turns to cigarettes, a drink, and/or smoking a joint when she is struggling with emotional pain.  We spoke about how she has medical marijuana for chronic pain and she does understand it can still be abused and she knows it can be a form of a quick escape from her problems.  She spoke about how she will continue to work on finding other ways to manage her emotions.      Status After Intervention:  Improved    Participation Level: Active Listener and Interactive    Participation Quality: Appropriate, Attentive, Sharing, and Supportive      Speech:  normal      Thought Process/Content: Logical  Linear      Affective Functioning: Congruent      Mood: anxious      Level of consciousness:  Alert  and Oriented x4      Response to Learning: Able to verbalize current knowledge/experience, Capable of insight, and Able to change behavior      Endings: None Reported    Modes of Intervention: Support, Socialization, Exploration, and Clarifying      Discipline Responsible: /Counselor      Signature:  Abi Medellin LCSW

## 2024-02-29 NOTE — BH NOTE
Group Therapy Note    Date: 2/29/2024    Group Start Time: 11:00 AM  Group End Time: 11:50 AM  Group Topic: Psychotherapy    [facility] BEHAVIORAL TH OP    Brandie Montez, SHELLI        Group Therapy Note    Attendees: 10 scheduled    Focus of session was on article “5 Steps for Managing your Emotional Triggers.”  We spoke about the importance of understanding the difference between reactions and responses to emotions.  I shared with group members the steps which include: accepting responsibility for your reactions, to recognize that you are having an emotional reaction as soon as it appears, determine what triggered the emotion, choose what you want to feel and what you want to do, and to actively shift your emotional state.   We spoke about what kind of differences that they may notice in practicing these steps and using these ideas in their emotional states.         Patient's Goal:  1Dep. F33.0 M .  “Pt will articulate and or put in writing a plan of action for coping with uncomfortable feelings that get unbalanced for her to help gain confidence for discharge planning.”    Notes:  Pt participated in the group activity and discussion.  She shared how she has found it helpful to recognize the signs of her body .. It has especially helped to know when she is fearful or anxious.  She has now been able to practice refocusing to not let herself be sucked into her feelings or \"down the rabbit hole\" of emotion     Status After Intervention:  Improved    Participation Level: Active Listener and Interactive    Participation Quality: Appropriate, Attentive, and Sharing      Speech:  normal      Thought Process/Content: Logical      Affective Functioning: Congruent      Mood: euthymic      Level of consciousness:  Alert and Attentive      Response to Learning: Able to verbalize current knowledge/experience, Able to verbalize/acknowledge new learning, Able to

## 2024-02-29 NOTE — BH NOTE
Group Therapy Note    Date: 2/29/2024    Group Start Time: 12:00 PM  Group End Time: 12:50 PM  Group Topic: Psychotherapy    [facility] BEHAVIORAL TH OP    Lacie, Abi BATEMAN, LCSW        Group Therapy Note    Attendees: 10 scheduled    Focus of session was based on information from [employer] and it was focused on how to build habits that can lead to more structure and routine. We discussed ideas like giving ourselves credit for what was accomplished, reviewing at the end of the day accomplishments and goals for tomorrow.  We discussed the idea of “good enough” and to let go of perfectionism and to set reasonable goals for ourselves in order to increase good feelings about ourselves.         Patient's Goal:   1. Dep F 33.0  M. “Pt will articulate and or put in writing a plan of action for coping with uncomfortable feelings that get unbalanced for her to help gain confidence for discharge planning.”     Notes:  January participated in group with prompting.  She spoke about how she knows she is \"switched around and I need to reward myself for accomplishments by being on my phone and not just starting my day being on my phone.\"  She was open to feedback and ideas and expresses understanding of how this can work for her if she was willing to try.      Status After Intervention:  Unchanged    Participation Level: Active Listener and Interactive    Participation Quality: Appropriate, Attentive, Sharing, and Supportive      Speech:  normal      Thought Process/Content: Logical  Linear  Perseverating      Affective Functioning: Congruent      Mood: anxious      Level of consciousness:  Alert, Oriented x4, and Attentive      Response to Learning: Able to verbalize current knowledge/experience, Able to retain information, and Capable of insight      Endings: None Reported    Modes of Intervention: Education, Support, Socialization, Exploration, and  Clarifying      Discipline Responsible: /Counselor      Signature:  Abi Medellin LCSW

## 2024-03-05 ENCOUNTER — HOSPITAL ENCOUNTER (OUTPATIENT)
Facility: HOSPITAL | Age: 69
Setting detail: RECURRING SERIES
Discharge: HOME | End: 2024-03-08
Payer: MEDICARE

## 2024-03-05 PROCEDURE — 90853 GROUP PSYCHOTHERAPY: CPT

## 2024-03-05 NOTE — BH NOTE
Group Therapy Note    Date: 3/5/2024    Group Start Time: 11:00 AM  Group End Time: 11:50 AM  Group Topic: Psychotherapy    [facility] BEHAVIORAL TH OP    Brandie Montez LCSW        Group Therapy Note    Attendees: 10 scheduled    Focus of session was based on information from [employer] on tips for processing changes.(Pt 2 Processing Change)  We spoke about our typical reactions to change and how we cope or don't cope with it.  We spoke about what changes are coming up and what can be exciting about it but also scary.  We spoke about the tips from the handout which included focusing on what you can control, writing out feelings, keeping up with self care, finding support, tune into the good, creating plans, and thinking of your strength and resilience.  We spoke about how journaling worries and fears can help us then plan for what actions we can take.  Group members worked through a change that is happening or that they can plan for to help with the transition and not lead to setbacks in their recovery       Patient's Goal:  1Dep. F33.0 S Pt will identify a fear that is holding her back from a wanted change in her life and work to find reasons why she can pursue the change and manage the fear at the same time.”      Notes:  Pt participated in the group activity and discussion.  She read aloud from the activity related to \"tips for processing change\"  She shared the difficulty and change she had related to her move back to [place].  She also shared that her brother recently told her he wanted to take possession of her sons money from disability and she is not in agreement.  She is looking to write a letter to him regarding  this but is again willing to make a change if necessary.  She stated she was not going to come today as she was very angry over this but decided to anyway.       Status After Intervention:  Improved    Participation Level: 
                                                                      Group Therapy Note    Date: 3/5/2024    Group Start Time: 12:00 PM  Group End Time: 12:50 PM  Group Topic: Psychotherapy    [facility] OP    Lacie, Abi BAT[facility]AN[facility]        Group Therapy Note    Attendees:  10 scheduled    Focus of session was on the DBT skill of radical acceptance.  I shared with group the idea of acceptance has to happen in order for a person to move forward.  I shared the skills set which includes radical acceptance, turning the mind, and being willing.  I shared that radical acceptance is accepting reality as it is and that life can be worth  living even when there is pain.  We spoke about how pain and not accepting it leads to more suffering.  I shared that it is difficult to accept the things we don't like but it is necessary.   I asked group members to share one thing in their life that they have accepted that has led to a release of pain and suffering and something that they need to work on accepting in their life so they can move on.       Patient's Goal:  1. Dep f 33.0  U.  Pt will identify one time when she made an impulsive choice due to her mood being unstable and/or her thoughts being irrational and how that choice impacted her    Notes:  January participated in group with prompting. She spoke about how she knows she \"does fight reality often.\"  She spoke about how she knows she can be in denial about her age and her limitations.  She is able to see how this can help her to accept what she is able to do and work with what she has got.     Status After Intervention:  Unchanged    Participation Level: Interactive    Participation Quality: Appropriate, Attentive, Sharing, and Supportive      Speech:  normal      Thought Process/Content: Logical  Linear      Affective Functioning: Congruent      Mood: anxious      Level of consciousness:  Alert, Oriented x4, and Attentive      Response to Learning: Able to 
Functioning: Congruent and Flat      Mood: anxious and irritable      Level of consciousness:  Alert, Oriented x4, Attentive, and Preoccupied      Response to Learning: Able to verbalize current knowledge/experience, Able to retain information, and Capable of insight      Endings: None Reported    Modes of Intervention: Education, Support, Socialization, Exploration, and Clarifying      Discipline Responsible: /Counselor      Signature:  Abi Medellin LCSW    
strategies for each and share with group.”  By 3/31/2024.       U N  “Pt will identify one time when she made an impulsive choice due to her mood being unstable and/or her thoughts being irrational and how that choice impacted her.”  By 3/26/2024.       PROBLEM  NUMBER:  PROBLEM:    STG Goal Status Comments (Progress or Lack of Progress)                                        []- Check ONLY if Problem/Goal Progress Comments  Continued on Another Page

## 2024-03-12 ENCOUNTER — HOSPITAL ENCOUNTER (OUTPATIENT)
Facility: HOSPITAL | Age: 69
Setting detail: RECURRING SERIES
Discharge: HOME | End: 2024-03-15
Payer: MEDICARE

## 2024-03-12 PROCEDURE — 90853 GROUP PSYCHOTHERAPY: CPT

## 2024-03-12 NOTE — BH NOTE
Group Therapy Note    Date: 3/12/2024    Group Start Time: 11:00 AM  Group End Time: 11:50 AM  Group Topic: Psychotherapy    [facility] BEHAVIORAL TH OP    Brandie Montez, CAMW        Group Therapy Note    Attendees: 10 scheduled    Focus of session was on positive self talk when feeling anger.  We spoke about how we can develop the skill of helping ourselves out of moments of anger that have a tendency to then impact your day and your functioning and well being.  We spoke about the usual self talk that goes with anger and how it can make things worse and asked group members to share their anger thoughts that are typical for them.  I shared some ideas for self talk of managing anger such as “I don't need to prove myself in this situation” or “as long as I keep my cool, I am in control” or “people are going to act the way they want to, not the way I want them to.”  We spoke about how these ideas can better sooth our anger and help us refocus on healthy self care.         Patient's Goal:  1Dep. F33.0 T Pt will identify specific relapse triggers & develop in writing two possible coping strategies for each and share with group.”      Notes:  Pt participated in the group activity and discussion.  She read aloud from the worksheet related to anger. She shared that she is able to control her anger responses better now.  She is not sure if that is because she is feeling better or just getting older. She feels she is now more at peace with herself and her reactions    Status After Intervention:  Improved    Participation Level: Active Listener and Interactive    Participation Quality: Appropriate, Attentive, Sharing, and Supportive      Speech:  normal      Thought Process/Content: Logical      Affective Functioning: Congruent      Mood: euthymic      Level of consciousness:  Alert, Oriented x4, and Attentive      Response to Learning: Able to verbalize

## 2024-03-12 NOTE — BH NOTE
Group Therapy Note    Date: 3/12/2024    Group Start Time: 10:00 AM  Group End Time: 10:50 AM  Group Topic: Psychotherapy    [facility] BEHAVIORAL TH OP    Brandie Montez LCSW        Group Therapy Note    Attendees: 10 scheduled    Focus of session was a review of the weekend and successes that can be identified in recognizing and managing triggers to negative feeling states.  We spoke about what was done in managing triggers that led to success and what was done or not done that led to struggles in maintaining and getting through difficult moments.  We also spoke about goals to work towards this week and what is reasonable to accomplish by the end of the week.         Behavioral Health Daily Check In form revealed that pt is not experiencing SI/HI thoughts or plans, remaining abstinent from drugs and alcohol, and reports goal today work in the yard today and soak up the sunshine  Patient's Goal:  1Dep. F33.0 S “Pt will identify a fear that is holding her back from a wanted change in her life and work to find reasons why she can pursue the change and manage the fear at the same time.”     Notes:  Pt listened to the group discussion and concerns of other members.  She was attentive and cooperative while observing the group. She reintroduced herself and welcomed back a member to group before remaining quiet for the rest of the group    Status After Intervention:  Improved    Participation Level: Active Listener and Interactive    Participation Quality: Appropriate, Attentive, and Sharing      Speech:  normal      Thought Process/Content: Logical      Affective Functioning: Congruent      Mood: euthymic      Level of consciousness:  Alert and Attentive      Response to Learning: Able to verbalize current knowledge/experience, Able to retain information, and Capable of insight      Endings: None Reported    Modes of Intervention: Education, Support,

## 2024-03-14 ENCOUNTER — HOSPITAL ENCOUNTER (OUTPATIENT)
Facility: HOSPITAL | Age: 69
Setting detail: RECURRING SERIES
Discharge: HOME | End: 2024-03-17
Payer: MEDICARE

## 2024-03-19 ENCOUNTER — HOSPITAL ENCOUNTER (OUTPATIENT)
Facility: HOSPITAL | Age: 69
Setting detail: RECURRING SERIES
Discharge: HOME | End: 2024-03-22
Payer: MEDICARE

## 2024-03-19 PROCEDURE — 90853 GROUP PSYCHOTHERAPY: CPT

## 2024-03-19 NOTE — BH NOTE
Group Therapy Note    Date: 3/19/2024    Group Start Time: 12:00 PM  Group End Time: 12:50 PM  Group Topic: Psychotherapy    [facility]    [facility]in[facility]        Group Therapy Note    Attendees: 11 scheduled    Focus of session was on tips for how to stay positive in a negative situation.  We spoke about how we normally handle negative situations and people and the emotions that come up.  We discussed the possible impact on negative situations the following questions: is this worth getting upset over or the amount of emotions that I am feeling, what is the lesson, am I overreacting or overthinking the situation, what positives can I take away from it or what can I focus on that is somewhat positive, how is this person helping me grow, can I control the situation, and what can I do right now to feel better and it is healthy for me?  We spoke about potential negative situations that patient may be dealing with and how they can practice coping and get through it with more positive results.       Patient's Goal:  1. Dep F 33.0  T.  Pt will identify specific relapse triggers & develop in writing two possible coping strategies for each and share with group    Notes:  January participated in group well.  She spoke about how she struggles in staying in control when she is talking to her son when he is in the negative state of mind that he can find himself in. We spoke about the strategy that we have talked about in the past that since it does not work for her to go to her room when he is talking and negative in focus as he will talk to her through the door, she has the option of getting up and leaving and going for a drive for him to be able to see that she is making an active change.      Status After Intervention:  Unchanged    Participation Level: Active Listener and Interactive    Participation Quality: Appropriate, Attentive,

## 2024-03-19 NOTE — BH NOTE
Group Therapy Note    Date: 3/19/2024    Group Start Time: 10:00 AM  Group End Time: 10:50 AM  Group Topic: Psychotherapy    [facility] BEHAVIORAL TH OP    Lacie, Abi BATEMAN, LCSW        Group Therapy Note    Attendees: 11 scheduled  Focus of session was on information from the Daily Stoic on what is a “velvet rut.”  We spoke about how it means that life is easy, not challenging, and comfortable.  We spoke about the idea is that this type of life was almost a death to the Stoic thinkers and the only way to live is to challenge yourself.  We discussed the concept that a person who has never been challenged, who always gets their way is almost a tragic figure.  We need to develop challenges in our live to grow stronger and better.  We spoke about how patient can use this focus at this time to help improve their well being and impact their recovery.         Patient's Goal:  1. Dep F 33.0  U.  “Pt will identify one time when she made an impulsive choice due to her mood being unstable and/or her thoughts being irrational and how that choice impacted her  Behavioral Health Daily Check In form revealed that patient is not experiencing SI/HI thoughts or plans, remaining abstinent from drugs and alcohol, and report's goal today of   \"get a  and start looking at prices.\"    Notes:  January participated in group with prompting.  She spoke about how she spent the weekend with her brother and that it went well and it helped her to be of help to him. She spoke about how she knows that she needs to make changes for herself that are positive and she will have to manage her reactions.  She spoke about how she will work to find the challenges that can help her get stronger and take positive steps forward.      Status After Intervention:  Unchanged    Participation Level: Active Listener and Interactive    Participation Quality: Appropriate, Attentive, Sharing,

## 2024-03-19 NOTE — BH NOTE
Group Therapy Note    Date: 3/19/2024    Group Start Time: 11:00 AM  Group End Time: 11:50 AM  Group Topic: Psychotherapy    [facility] BE[facility]AVIOR[facility]    Brandie Montez, SHELLI        Group Therapy Note    Attendees: 11 scheduled    Focus of session was on developing understanding of how thoughts lead to emotions and behaviors as taught in Cognitive Behavioral Therapy.  We spoke about the technique of “knowing your ABC's” in CBT.  Group members learned about how “A” stands for the activating events that is a real external event that has occurred, or a “trigger.”  B is for “beliefs” or thoughts, attitudes, meanings we attach to A, and “C” is for consequences and our behaviors that are either constructive or destructive as a result.  We discussed recent triggers and what we thought, believed, and felt about it as well as what our choice is in regards to our actions         Patient's Goal:  1Dep. F33.0 S .  “Pt will identify a fear that is holding her back from a wanted change in her life and work to find reasons why she can pursue the change and manage the fear at the same time.”      Notes:  Pt participated in the group activity and discussion.  She was able to  work through the emotional regulation model and learn about her own responses.  She then shared experiences that she has had with her family(brother especially) that she could apply to this concept.     Status After Intervention:  Improved    Participation Level: Active Listener    Participation Quality: Appropriate, Attentive, Sharing, and Supportive      Speech:  normal      Thought Process/Content: Logical      Affective Functioning: Congruent      Mood: euthymic      Level of consciousness:  Alert and Attentive      Response to Learning: Able to verbalize current knowledge/experience, Able to verbalize/acknowledge new learning, Able to retain information, Capable of insight, Able to

## 2024-03-26 ENCOUNTER — HOSPITAL ENCOUNTER (OUTPATIENT)
Facility: HOSPITAL | Age: 69
Setting detail: RECURRING SERIES
Discharge: HOME | End: 2024-03-29
Payer: MEDICARE

## 2024-03-26 PROBLEM — F33.0 MAJOR DEPRESSIVE DISORDER, RECURRENT EPISODE, MILD (HCC): Status: ACTIVE | Noted: 2020-07-20

## 2024-03-26 PROCEDURE — 90853 GROUP PSYCHOTHERAPY: CPT

## 2024-03-26 PROCEDURE — 99214 OFFICE O/P EST MOD 30 MIN: CPT | Performed by: PSYCHIATRY & NEUROLOGY

## 2024-03-26 RX ORDER — MIRTAZAPINE 45 MG/1
45 TABLET, FILM COATED ORAL NIGHTLY
Qty: 90 TABLET | Refills: 3 | Status: SHIPPED | OUTPATIENT
Start: 2024-03-26

## 2024-03-26 NOTE — BH NOTE
Group Therapy Note    Date: 3/26/2024    Group Start Time: 10:00 AM  Group End Time: 10:50 AM  Group Topic: Psychotherapy    [facility] BEHAVIORAL TH OP    Brandie Montez LCSW        Group Therapy Note    Attendees: 10 scheduled  Focus of session was a review of the weekend and successes that can be identified in recognizing and managing triggers to negative feeling states.  We spoke about what was done in managing triggers that led to success and what was done or not done that led to struggles in maintaining and getting through difficult moments.  We also spoke about goals to work towards this week and what is reasonable to accomplish by the end of the week.         Behavioral Health Daily Check In form revealed that pt is not experiencing SI/HI thoughts or plans, remaining abstinent from drugs and alcohol, and reports goal today to do more around the house instead of staying in my room     Patient's Goal:  1Dep. F33.0 U Pt will identify one time when she made an impulsive choice due to her mood being unstable and/or her thoughts being irrational and how that choice impacted her.”      Notes:  Pt participated in the group discussion and engaged with others.  She was supportive of others in the group that were expressing concerns.  She was able to share her experiences with group and what it was like before she started.  The other pt's were receptive to her support.     Status After Intervention:  Improved    Participation Level: Active Listener and Interactive    Participation Quality: Appropriate, Attentive, Sharing, and Supportive      Speech:  normal      Thought Process/Content: Logical      Affective Functioning: Congruent      Mood: euthymic      Level of consciousness:  Alert and Attentive      Response to Learning: Able to verbalize current knowledge/experience, Able to verbalize/acknowledge new learning, Able to retain information,

## 2024-03-26 NOTE — BH NOTE
Group Therapy Note    Date: 3/26/2024    Group Start Time: 11:00 AM  Group End Time: 11:50 AM  Group Topic: Psychotherapy    [facility] BEHAVIORAL TH OP    Brandie Montez LCSW        Group Therapy Note    Attendees: 10 scheduled    Focus of session was based on “Rational Rules for Living” and how following these rules can help us cope and manage intense thoughts and feelings.  We spoke about how we have thoughts without even knowing it and that some are rational and some thoughts are not.  We spoke about the fact that we can change how we think but it takes work and practice. We spoke about simplest thoughts are but how powerful they can believe if we start acting like we believe them.  We spoke about the following rules: It's nice to have peoples approval but even without it I can accept myself, Its best to focus on the positive, People are not always going to act the way that I would like them to, Things are the way they are right now and the way they should be, and I can control my thoughts, feelings, behavior, and body.  We spoke about which skill group members would like to work to incorporate into their daily life right now.         Patient's Goal:  1Dep. F33.0 S “Pt will identify a fear that is holding her back from a wanted change in her life and work to find reasons why she can pursue the change and manage the fear at the same time.”      Notes:  Pt participated in the group activity and discussion. Pt continues to want to make herself get out of her room and not isolate.  She is looking forward to warmer weather and states  she knows she will have to get out and mow the grass. Pt states she feels more confident and less depressed through coming to group on a regular basis     Status After Intervention:  Improved    Participation Level: Active Listener and Interactive    Participation Quality: Appropriate, Attentive, Sharing, and

## 2024-03-26 NOTE — BH NOTE
Group Therapy Note    Date: 3/26/2024    Group Start Time: 12:00 PM  Group End Time: 12:50 PM  Group Topic: Psychotherapy    [facility] BEHAVIORAL TH OP    Abi Medellin, hospitalsW        Group Therapy Note    Attendees: 10 scheduled    Focus of session was from Chencho Mccrary book “The Feeling Good Handbook.”  We went over the information on what are the reasons people do not communicate with one another and how much a lack of communication impacts patient's day to day life.  We spoke about the reasons which include having conflict phobia, believing that we “should not feel” certain feelings like anger or depression.  We also spoke about fear of disapproval and rejection, passive aggressive habits, feelings of hopelessness, low self esteem, and an immediate need to focus on solving problems rather than listening and sharing.  We spoke about what patient can identify as any reason as to why they do not share their thoughts and feelings easily.        Patient's Goal:  1. Dep F 33.0  T.  “Pt will identify specific relapse triggers & develop in writing two possible coping strategies for each and share with group.”      Notes:  January participated in group with prompting and she spoke about how she was taught to never express herself when she was younger and \"the fact that I had such a large family played a role.\"  She spoke about how she knows that she has been more depressed lately due to isolating herself.  She is willing to go up on her medications to try and help her through this period of \"feeling like I am backtracking.\"      Status After Intervention:  Unchanged    Participation Level: Active Listener and Interactive    Participation Quality: Appropriate, Attentive, Sharing, and Supportive      Speech:  normal      Thought Process/Content: Logical  Linear      Affective Functioning: Congruent      Mood: anxious and depressed      Level of consciousness:

## 2024-03-26 NOTE — PROGRESS NOTES
5 MG TABS tablet Take by mouth     No current facility-administered medications for this encounter.       MENTAL STATUS UPDATE: (Positives in Bold)  Appearance:   Neat   Disheveled  Odiferous   Appropriate  Orientation:   Time  Place  Person  Speech:   Coherent  Pressured  Garbled/Slurred  Loud   Soft  Mute  Memory:   Intact  Impaired (Recent  Remote)--Mild  Severe  Mood:   Euthymic  Depressed-mild  Anxious  Elated  Affect:    Appropriate  Inappropriate  Labile  Blunted  Flat  Thought Content:   Logical/Appropriate  Paranoid  Delusional  Illogical IOR  SI  HI  Thought Process:   Coherent/linear  Tangential  Loose/Disorganized   Hallucinations:   None  Auditory  Visual  Tactile  Olfactory  Gustatory  Insight:   Good  Fair  Impaired      Judgment:   Good  Fair  Impaired     CONTINUED NEED FOR TREATMENT: (Positives in Bold)  Continued psychiatric symptoms causing impairment in IDL  or functioning  Continued non-compliance with meds resulting in decompensation  High level of debilitation and symptoms with inadequate support  Continued need for structured care to adjust medications  Continued presence of debilitating symptoms  Continued presence of risk factors     CONTINUED NEED FOR GROUP PSYCHOTHERAPY TO ADDRESS THE FOLLOWING: (Positives in Bold)  Psychiatric symptom management       Psychiatric relapse prevention    Anxiety management      Self-esteem issues      Poor decision making       Recent decompensation       Safety issues      Anger management     Self care/ADL's     Med/treatment compliance      Impaired boundaries/relationships     Assertiveness      Grief/loss resolution       Guilt/shame issues     Realistic goal setting      DIAGNOSTIC IMPRESSION:  Possible Bipolar disorder type II--more likely now (F31.81).  Major Depression, recurrent, mild now (F33.0).     PLAN:   Continue the Remeron to 45 mg QHS--#90 with 3 refills (90 day)  Try to increase the Lamictal to 100 mg BID--has ~3 months of refills (30

## 2024-04-01 ENCOUNTER — HOSPITAL ENCOUNTER (OUTPATIENT)
Facility: HOSPITAL | Age: 69
Setting detail: RECURRING SERIES
Discharge: HOME | End: 2024-04-04
Payer: MEDICARE

## 2024-04-01 PROCEDURE — 90853 GROUP PSYCHOTHERAPY: CPT

## 2024-04-01 NOTE — BH NOTE
Group Therapy Note    Date: 4/1/2024    Group Start Time: 10:00 AM  Group End Time: 10:50 AM  Group Topic: Psychotherapy    [facility]    Abi Medellin, LCSW        Group Therapy Note    Attendees: 7 scheduled    Focus of session was a review of the weekend and helping group members see their wins that they can celebrate from the past several days.  We spoke about the things that occurred and choices that they had in front of them and helping everyone to see the positive impact that they had on their well being.  We also spoke about what may have been the setbacks and how they coped and what they can plan to do for the future to help build on relapse prevention.     Behavioral Health Daily Check In form revealed that patient is not experiencing SI/HI thoughts or plans, remaining abstinent from drugs and alcohol, and report's goal today of  \"take meds, get outside more, and pay my bills.\"      Patient's Goal:  1. Dep F 31.81   M  “Pt will articulate and or put in writing a plan of action for coping with uncomfortable feelings that get unbalanced for her to help gain confidence for discharge planning    Notes:  January participated in group with prompting.  She spoke about how she has had some good things happen and she enjoyed Easter.  She spoke about how she was able to focus on her manuel and her spirituality and she noticed \"I ended up feeling better overall the whole day.\"  She continues to talk about how she gets very consumed with the news and being on her cell phone.  She is insightful about the fact that she will benefit from making some changes.      Status After Intervention:  Unchanged    Participation Level: Active Listener, Interactive, and Monopolizing    Participation Quality: Attentive, Sharing, Supportive, and Intrusive      Speech:  normal and pressured      Thought Process/Content: Logical  Linear  Flight of

## 2024-04-01 NOTE — BH NOTE
Group Therapy Note    Date: 4/1/2024    Group Start Time: 11:00 AM  Group End Time: 11:50 AM  Group Topic: Psychotherapy    [facility]    Lacie[facility] Abi BATEMAN[facility]        Group Therapy Note    Attendees: 7 scheduled    Focus of session was on procrastination and the possible reasons behind why we do it.  We spoke about fear and lack of confidence and esteem can play a major role in our reasons for doing this and we spoke about how we can also let being stubborn get in our way and that affects our ability to move forward.  We spoke about how we can be contributing the reason to not do these things by how we talk to ourselves and that we tell ourselves that we are not strong enough and we are not capable.  We addressed effective ways to avoid procrastination and that we need to refute our arguments aggressively as to why we are putting things off.  We spoke about addressing specifically our reason for delay and developing fair arguments against delay and work to move forward.       Patient's Goal:  1. Dep F 31. 81  U.  Pt will identify one time when she made an impulsive choice due to her mood being unstable and/or her thoughts being irrational and how that choice impacted her    Notes:  January participated in group with prompting.  She spoke about how she has been procrastinating making a lot of changes in her life.  She continues to be aware of how much she overthinks things and \"this can throw me off my plan to try new things.\"  She spoke about how she has a lot to do at the house and she is \"wasting a lot of time just sitting on my bed and being on my phone.\"      Status After Intervention:  Unchanged    Participation Level: Active Listener and Interactive    Participation Quality: Appropriate, Attentive, Sharing, and Supportive      Speech:  normal      Thought Process/Content: Logical  Linear      Affective Functioning:

## 2024-04-05 NOTE — BH NOTE
[facility]  Group Therapy  Treatment Plan Review    TREATMENT PLAN REVIEW REVIEW DATE: 3/29/2024     summary of Ongoing issues with Symptoms/Functional Impairments Indicating Need for Continued Stay:  January has been maintaining her mood and functioning over the past month. She has maintained as she has transitioned to one day a week.  She continues to report a struggle in motivating herself to get out of her room and her house but she expressed knowing that she understands this will help her mood and her functioning.  We will continue to have pt attend one day a week at this time.       TREATMENT & DISCHARGE PLANNING CHANGES    Modality or Intervention  Changes Pt will continue at one day a week, three groups a day at this time.    Psychotropic Medication Changes On 3/26/2024 encouraged pt to start Lamictal 100 mg BID which she did not start after it was discussed last month.     Discharge  Planning or Target Date  Changes 5/30/2024   New Issues No new issues     TREATMENT TEAM SIGNATURE / DATE   I have participated in the development of this plan of treatment and agreed to its implementation.   Client Signature PRINTED Name                       January Bricenoman Date & Time       Family / Legal Representative Signature (If Applicable) PRINTED Name & Relationship     Date & Time   Therapist Signature PRINTED Name & Credential                            [facility] Date & Time       Therapist Signature PRINTED Name & Credential                         [facility]ev[facility]   Date & Time       Other Signature PRINTED Name & Credential     Date & Time   Other Signature PRINTED Name & Credential or Relationship     Date & Time       PHYSICIAN CERTIFICATION OF THE LEVEL OF CARE:  I certify that these outpatient behavioral health services are medically necessary to improve and maintain the patient's condition and functional level and prevent relapse or admission to a higher level of

## 2024-04-08 RX ORDER — LEVOTHYROXINE SODIUM 0.15 MG/1
150 TABLET ORAL DAILY
Qty: 90 TABLET | Refills: 0 | Status: SHIPPED | OUTPATIENT
Start: 2024-04-08

## 2024-04-08 NOTE — TELEPHONE ENCOUNTER
Patient requesting refill on     Requested Prescriptions     Pending Prescriptions Disp Refills    levothyroxine (SYNTHROID) 150 MCG tablet [Pharmacy Med Name: LEVOTHYROXINE 150 MCG TABLET] 90 tablet 0     Sig: TAKE 1 TABLET BY MOUTH EVERY DAY        Last OV 5/18/2022

## 2024-04-09 ENCOUNTER — HOSPITAL ENCOUNTER (OUTPATIENT)
Facility: HOSPITAL | Age: 69
Setting detail: RECURRING SERIES
Discharge: HOME | End: 2024-04-12
Payer: MEDICARE

## 2024-04-09 PROCEDURE — 90853 GROUP PSYCHOTHERAPY: CPT

## 2024-04-09 NOTE — BH NOTE
Group Therapy Note    Date: 4/9/2024    Group Start Time: 10:00 AM  Group End Time: 10:50 AM  Group Topic: Psychotherapy    [facility] [place]    Lacie, Abi S, LCSW        Group Therapy Note    Attendees:  9 scheduled    Focus of session was information on the difference between a victim mentality, an survivor mentality, and a thriver mentality.  We spoke about how attitude makes a lot of difference in what we do and how we choose to cope in those times when we are thinking negative thoughts that lead to a victim mentality.  I shared that victim thinking and behavior includes negative self talk, isolating behavior, overwhelmed by the past, believes everyone else is better, and places own needs last.  A survivor mentality and behavior includes seeing oneself as wounded but healing, knows that they deserve to seek help, not afraid to tell their story, and are learning healthy needs.  A thriver mentality includes gratitude, proud of self care, living in the present, and protects self from unsafe others.  We spoke about what actions can be taken or what thoughts could be changed to develop a thriver mentality and being.     Behavioral Health Daily Check In form revealed that patient is not experiencing SI/HI thoughts or plans, remaining abstinent from drugs and alcohol, and report's goal today of   \"to stay on medications and do some work around the house.\"      Patient's Goal:  1. Dep F Q.  “Pt will ask the question 'is there anything I can do right now to change the past or to positively influence the future' and, if yes, take action on what she can do and report to group.”     Notes:  January participated in group with prompting.  She spoke about how she has been upset about her brother and how he has been acting.  She did say that it prompted her to try and \"minimize and I went through all my clothes and got rid of multiple clothes and

## 2024-04-09 NOTE — BH NOTE
Group Therapy Note    Date: 4/9/2024    Group Start Time: 11:00 AM  Group End Time: 11:50 AM  Group Topic: Psychotherapy    [facility]AVIOR[facility]    Brandie Montez, CAMW        Group Therapy Note    Attendees: 9 scheduled     Focus of session was on the article \"The Emotion Chart My Therapist Gave Me That I Didn't Know I Needed.\"  We spoke about how we often are not willing to identify and process emotions as they come up and we tend to suppress or avoid them. We discussed how this then leads to overreacting later to little things that occur and we are then overly emotional. We spoke about how the Wheel of Emotions can then lead to identifying broader emotions and then can help us specify the underlying emotions.         Patient's Goal:  1Dep. F33.0 S “Pt will identify a fear that is holding her back from a wanted change in her life and work to find reasons why she can pursue the change and manage the fear at the same time.”     Notes:  Pt participated in the group discussion and activity.  She stated she was able to clean out multiple closets last week and get rid of things she didn't need in case she has to move.  Initially pt started this as she was angry with her brother but she was able to funnel that anger into something positive for herself.     Status After Intervention:  Improved    Participation Level: Active Listener and Interactive    Participation Quality: Appropriate, Attentive, and Sharing      Speech:  normal      Thought Process/Content: Logical      Affective Functioning: Congruent      Mood: euthymic      Level of consciousness:  Alert and Attentive      Response to Learning: Able to verbalize current knowledge/experience, Able to verbalize/acknowledge new learning, Able to retain information, Capable of insight, Able to change behavior, and Progressing to goal      Endings: Suicidality    Modes of Intervention: Education,

## 2024-04-09 NOTE — BH NOTE
Group Therapy Note    Date: 4/9/2024    Group Start Time: 12:00 PM  Group End Time: 12:50 PM  Group Topic: Psychotherapy    [facility]    Lacie[facility] Abi [facility]AN[facility]        Group Therapy Note    Attendees:  9 scheduled    Focus of session was based on an article from [employer] on 5 skills for conflict resolution.  We reviewed the five skills which are: Avoiding, Competing, Accommodating, Collaborating and Compromising.  We spoke about how these all have pros and cons and we spoke about group members particular skills that they use and how they work, and what they think they may need to change.  We spoke about current conflicts that they are experiencing and how they can work to solve the compromise.        Patient's Goal:  1 Dep F 33.0  U.  “Pt will identify one time when she made an impulsive choice due to her mood being unstable and/or her thoughts being irrational and how that choice impacted her    Notes:  January continues to stay focused on her brother and her son but she is able to process things she can do differently better than in earlier session.  She spoke about how she will work on listening more and she spoke about her struggle with her son who is consistently repeating himself.  We spoke about some strategies that could help with that.      Status After Intervention:  Unchanged    Participation Level: Active Listener and Interactive    Participation Quality: Appropriate, Attentive, Sharing, and Supportive      Speech:  normal and pressured      Thought Process/Content: Logical  Linear  Flight of ideas      Affective Functioning: Congruent      Mood: anxious      Level of consciousness:  Alert, Oriented x4, Attentive, and Preoccupied      Response to Learning: Able to verbalize current knowledge/experience, Able to retain information, and Capable of insight      Endings: None Reported    Modes of Intervention:

## 2024-04-17 ENCOUNTER — HOSPITAL ENCOUNTER (OUTPATIENT)
Facility: HOSPITAL | Age: 69
Setting detail: RECURRING SERIES
Discharge: HOME | End: 2024-04-20
Payer: MEDICARE

## 2024-04-17 PROCEDURE — 90853 GROUP PSYCHOTHERAPY: CPT

## 2024-04-17 NOTE — BH NOTE
Group Therapy Note    Date: 4/17/2024    Group Start Time: 12:00 PM  Group End Time: 12:50 PM  Group Topic: Psychotherapy    [facility]    Brandie Montez, CAMW        Group Therapy Note    Attendees: 11 scheduled    Focus of session was on “The Symptoms of Inner Peace” by Americo Xiao.  I shared the idea with group and that it includes the ideas of how inner peace is a disease and here is what it looks like.  The symptoms included “a loss of ability to worry, a loss of interest in conflict, an unmistakable ability to enjoy each moment, and frequent, overwhelming episodes of appreciation.”  I asked group members to share which “symptom” they can work to get and how they can see it will help them have more peace in their day to day life.        Patient's Goal:  1Dep. F33.0 T “Pt will identify specific relapse triggers & develop in writing two possible coping strategies for each and share with group.    Notes:  Pt participated int he group activity and discussion.  She stated that she was able to calm herself down and knows she needs to work on her anger management skills in relation to her brothers situation. She joined others in their support of members that shared their concerns and updates    Status After Intervention:  Improved    Participation Level: Active Listener and Interactive    Participation Quality: Appropriate, Attentive, Sharing, and Supportive      Speech:  normal      Thought Process/Content: Logical      Affective Functioning: Congruent      Mood: euthymic      Level of consciousness:  Alert and Attentive      Response to Learning: Able to verbalize current knowledge/experience, Able to verbalize/acknowledge new learning, Able to retain information, Capable of insight, Able to change behavior, and Progressing to goal      Endings: None Reported    Modes of Intervention: Education, Support, Socialization,

## 2024-04-17 NOTE — GROUP NOTE
knowledge/experience, Able to retain information, Capable of insight, and Progressing to goal      Endings: None Reported    Modes of Intervention: Education, Support, Socialization, Exploration, and Clarifying      Discipline Responsible: /Counselor      Signature:  Abi Medellin LCSW

## 2024-04-17 NOTE — BH NOTE
Group Therapy Note    Date: 4/17/2024    Group Start Time: 10:00 AM  Group End Time: 10:50 AM  Group Topic: Psychotherapy    [facility] BEHAVIOR[facility] OP    Brandie Montez, SHELLI        Group Therapy Note    Attendees: 11 scheduled    Focus of session was on assertiveness and a lack of it contributes to depression, anxiety, worry, and fear that build up over even small and manageable things that we need to say.  We spoke about the skill from DBT called DEAR MAN that can help us to focus on what we need to say and why we need to say it.  We spoke about how we cannot assume that others understand how we feel and we have to express ourselves to others clearly.  We spoke about how we have to change in order to help others change for us in positive ways for our relationships and that we will continue to feel the above feelings unless we take some risks and be willing to make things worse before they have a chance to get better.      Behavioral Health Daily Check In form revealed that pt is not experiencing SI/HI thoughts or plans, remaining abstinent from drugs and alcohol, and reports goal today take medication and trying to stay busy  Patient's Goal:  1Dep. F33.0 Q .  “Pt will ask the question 'is there anything I can do right now to change the past or to positively influence the future' and, if yes, take action on what she can do and report to group.”      Notes:  Pt shared that she went to visit her brother recently.  She chose to clean and cook while she was there.  Pt expressed her anger toward her brothers girlfriend and what she feels she should ot should not be doing.  Her brother made a comment when she was leaving that also angered her and she has been angry for days.  We attempted to discuss what her anger is really about and what she could do herself to take care of herself and also deal with her feelings. Pt knows she can not control either

## 2024-04-18 NOTE — TELEPHONE ENCOUNTER
[facility] is telling January they never received the refill for this med on 4-8 and needs it to be sent again.

## 2024-04-19 RX ORDER — LEVOTHYROXINE SODIUM 0.15 MG/1
150 TABLET ORAL DAILY
Qty: 90 TABLET | Refills: 0 | Status: SHIPPED | OUTPATIENT
Start: 2024-04-19

## 2024-04-24 ENCOUNTER — HOSPITAL ENCOUNTER (OUTPATIENT)
Facility: HOSPITAL | Age: 69
Setting detail: RECURRING SERIES
Discharge: HOME | End: 2024-04-27
Payer: MEDICARE

## 2024-04-25 ENCOUNTER — HOSPITAL ENCOUNTER (OUTPATIENT)
Facility: HOSPITAL | Age: 69
Setting detail: RECURRING SERIES
Discharge: HOME | End: 2024-04-28
Payer: MEDICARE

## 2024-04-25 DIAGNOSIS — F31.81 BIPOLAR II DISORDER (HCC): Primary | ICD-10-CM

## 2024-04-25 DIAGNOSIS — F33.1 MAJOR DEPRESSIVE DISORDER, RECURRENT, MODERATE (HCC): ICD-10-CM

## 2024-04-25 PROCEDURE — 90853 GROUP PSYCHOTHERAPY: CPT

## 2024-04-25 NOTE — BH NOTE
Group Therapy Note    Date: 4/25/2024    Group Start Time: 10:00 AM  Group End Time: 10:50 AM  Group Topic: Psychotherapy    [facility]    Abi Medellin, [facility]        Group Therapy Note    Attendees:  10 scheduled    Focus of session was based on the article by Romulo Gandhi “4 Questions that Will Change Your Attitude (When You Can't Change Anything else)”.  We spoke about the quote “a peaceful person is not a person who is always in a good situation, but rather a person who always has a good attitude in every situation.”  We spoke about how the one controllable thing in every situation is our attitude and making deliberate choices about it.  We went through the questions in the article: who would you be and what else would you see, if you erased the thought that's worrying you, what could you be positive about right now if you really wanted to, how can you respond from a place of clarity and strength, rather than thoughtlessly reacting to this experience, and what can you let go of right now without losing anything.  We spoke about how having moment to moment attitude checks can help us cope better in the day to day.   Behavioral Health Daily Check In form revealed that patient is not experiencing SI/HI thoughts or plans, remaining abstinent from drugs and alcohol, and report's goal today of   \"take my medications and work around the house.\"    Patient's Goal:  1. Dep F 33.0  U.  Pt will identify one time when she made an impulsive choice due to her mood being unstable and/or her thoughts being irrational and how that choice impacted her    Notes:  January participated actively in group and she was able to respond well to the article and discussion.  She spoke about how she can see she often has a negative attitude towards situations and she has a hard time letting go of things that other people do.  We continue to talk about her son 
SOP PROGRESS NOTE     INTERVAL HISTORY/FINDINGS:      January reports she has been having some mild mood swings, sometimes worrying about things. Is practicing mindfulness which she learned in SOP.     Feels a little isolated, but is okay with it for now, is content. Is enjoying spending time with a friend who has a cat who brings live mice in the house, then chases the mice. Great entertainment. Has just discovered [employer], where she plans to enjoy some of the activities they offer. She and her son have put together a  and plan to spend more time outdoors, as she knows she needs to get outside the house. She's energetic when she is around people, may feel flat when she is inactive. She is learning \"less is more\" and that she doesn't have to be around people 24/7. We discussed going to the library for book club meetings and volunteering opportunities in the area.     She is still living in the [place] home owned by her brother who is an , and has to remind him that she is not his client, rather than his sister. The brother lives in his [place] home with his significant other, who is 19 years younger. There is some tension between January and Izabella.     Did increase Lamictal to 100 mg BID without problems. No new rashes or skin changes. She is about to start the increased dose of Remeron tomorrow night. She has no SE's with her 2 meds, including no rash, which we discussed again.        MEDICATIONS:       Current Outpatient Medications   Medication Sig    lamoTRIgine (LAMICTAL) 100 MG tablet Take 1 tablet by mouth 2 times daily    KLOR-CON M10 10 MEQ extended release tablet TAKE 1 TABLET BY MOUTH EVERY DAY    levothyroxine (SYNTHROID) 150 MCG tablet TAKE 1 TABLET BY MOUTH EVERY DAY    lisinopril (PRINIVIL;ZESTRIL) 20 MG tablet TAKE 1 TABLET BY MOUTH EVERY DAY FOR HIGH BLOOD PRESSURE    hydroCHLOROthiazide (HYDRODIURIL) 12.5 MG tablet TAKE 1 TABLET BY MOUTH EVERY DAY FOR BLOOD PRESSURE    
knowledge/experience, Able to retain information, Capable of insight, and Progressing to goal      Endings: None Reported    Modes of Intervention: Education, Support, Socialization, Exploration, and Clarifying      Discipline Responsible: /Counselor      Signature:  Abi Medellin LCSW

## 2024-05-01 ENCOUNTER — HOSPITAL ENCOUNTER (OUTPATIENT)
Facility: HOSPITAL | Age: 69
Setting detail: RECURRING SERIES
Discharge: HOME | End: 2024-05-04
Payer: MEDICARE

## 2024-05-01 PROCEDURE — 90853 GROUP PSYCHOTHERAPY: CPT

## 2024-05-01 NOTE — BH NOTE
Group Therapy Note    Date: 5/1/2024    Group Start Time: 10:00 AM  Group End Time: 10:50 AM  Group Topic: Psychotherapy    [facility]    Brandie Montez LCSW        Group Therapy Note    Attendees: 10 scheduled     Focus of session was on the visible and the hidden emotional threats that we are facing and trying to cope with.  We spoke about the three steps to emotional management and they include awareness, plan, and action.  We spoke about the importance of being aware of our hidden emotional threats or triggers since they can be more destructive and unmanageable that the visible ones.  We spoke about how our hidden emotional triggers can include low self esteem issues, unrealistic expectations, chronic worry that leads to increased vulnerability as well as focus on the past and our mistakes that we made.  We spoke about how a person will be increasingly vulnerable if hidden emotional threats are not identified and managed.         Behavioral Health Daily Check In form revealed that pt is not experiencing SI/HI thoughts or plans, remaining abstinent from drugs and alcohol, and reports goal today to go to the store and work around the house.     Patient's Goal:  1Dep. F33.0 U .  “Pt will identify one time when she made an impulsive choice due to her mood being unstable and/or her thoughts being irrational and how that choice impacted her.”      Notes:  Pt listened to the group discussion and concerns of others.  She introduced herself to the new group member and shared some of her mental health experiences.  Pt was welcoming and also supportive of another member who was having relationship difficulties.  Pt stated that she feels things in her life are going more positively      Status After Intervention:  Improved    Participation Level: Active Listener and Interactive    Participation Quality: Appropriate, Attentive, and

## 2024-05-01 NOTE — BH NOTE
Group Therapy Note    Date: 5/1/2024    Group Start Time: 11:00 AM  Group End Time: 11:50 AM  Group Topic: Psychotherapy    [facility]AVIOR[facility] OP    Lacie[facility] Abi S, [facility]        Group Therapy Note    Attendees:  10 scheduled    Focus of session was on mindfulness of others and how this can improve relationships in our lives.  We spoke about how this can mean that we really work to pay attention to what others are saying and how to clear up any conflicts with others as peacefully but effectively as we can.  We spoke about how we can offer validation of others and how this can improve relationships and can be the encouragement others need to validate us when we need to.  We spoke about the importance of not making assumptions and to be our own person in any relationship.  We then spoke about how we can practice being mindful of others such as letting go of focus on my own internal thoughts and be curious about others around me.        Patient's Goal:  1. Dep F 33.0  T.  “Pt will identify specific relapse triggers & develop in writing two possible coping strategies for each and share with group.”    Notes:  January participated in group with prompting.  She spoke about how she is trying to improve her relationship with her brother and she is working to slow down in her reactions she typically has when talking to him or with him and his girlfriend.  She continues to get very agitated when talking about his girlfriend and how little she does for her brother.  She is benefiting from processing her feelings and getting some direction as to what can help her better and solve this ongoing issue with her brother.      Status After Intervention:  Improved    Participation Level: Active Listener and Interactive    Participation Quality: Appropriate, Attentive, Sharing, and Supportive      Speech:  normal      Thought Process/Content:

## 2024-05-01 NOTE — BH NOTE
Group Therapy Note    Date: 5/1/2024    Group Start Time: 12:00 PM  Group End Time: 12:50 PM  Group Topic: Psychotherapy    [facility]HAVIORAL TH OP    Brandie Montez, CAMW        Group Therapy Note    Attendees: 10 scheduled    Focus of session was on identifying core beliefs that we may have that lead us to increased feelings of depression, anxiety, anger, and low self esteem.  We spoke about the need to understand our beliefs that lead us to these painful emotions and feeling states.  We spoke about how these can be challenged by working to provide pieces of evidence to the contrary to our negative core beliefs.  We also spoke about how we can put this into practice when we are out in the world being challenged with negative thinking patterns.         Patient's Goal:  1Dep. F33.0 S .  “Pt will identify a fear that is holding her back from a wanted change in her life and work to find reasons why she can pursue the change and manage the fear at the same time.”      Notes:  Pt participated in the group activity and engaged with the activity.  She followed along with the reading of the worksheet related to core beliefs. She shared for most of her life she has felt undeserving.  She would be given things or have experiences but didn't fel worthy.  Pt has worked on being more self confident and now understands that she does deserve to be treated well and is learning to expect that from others     Status After Intervention:  Improved    Participation Level: Active Listener and Interactive    Participation Quality: Appropriate, Attentive, and Sharing      Speech:  normal      Thought Process/Content: Logical      Affective Functioning: Congruent      Mood: euthymic      Level of consciousness:  Alert and Attentive      Response to Learning: Able to verbalize current knowledge/experience, Able to verbalize/acknowledge new learning, Able to retain

## 2024-05-07 DIAGNOSIS — B35.1 NAIL FUNGUS: ICD-10-CM

## 2024-05-07 RX ORDER — CICLOPIROX 80 MG/ML
SOLUTION TOPICAL
Qty: 6 ML | Refills: 0 | Status: SHIPPED | OUTPATIENT
Start: 2024-05-07

## 2024-05-07 NOTE — TELEPHONE ENCOUNTER
Medication Refill Request    January Bermeo is requesting a refill of the following medication(s):   ciclopirox (PENLAC) 8 % solution   Please send refill to:       [facility]/pharmacy #[id number] - [place], [place] - [address] [facility] [phone] - F [phone]  [address] [facility] [address]  Phone: [phone] Fax: [phone]

## 2024-05-08 ENCOUNTER — HOSPITAL ENCOUNTER (OUTPATIENT)
Facility: HOSPITAL | Age: 69
Setting detail: RECURRING SERIES
Discharge: HOME | End: 2024-05-11
Payer: MEDICARE

## 2024-05-08 PROCEDURE — 90853 GROUP PSYCHOTHERAPY: CPT

## 2024-05-08 NOTE — BH NOTE
Group Therapy Note    Date: 5/8/2024    Group Start Time: 10:00 AM  Group End Time: 10:50 AM  Group Topic: Psychotherapy    [facility] BEHAVIORAL [facility] OP    [facility]bi Medellin, [facility]        Group Therapy Note    Attendees: 11 scheduled    Focus of session was on the CBT model of the maladaptive cycle of symptoms “vicious cycle” and how it begins with the trigger.  We focused on recent triggers that can include an overwhelming situation, place, sensation, feeling, thought, impulse.  Then we addressed what thoughts that led us to think including irrational thoughts or exaggerated beliefs, such as negative thoughts about oneself, or catastrophic thoughts and worries.  We spoke about how that leads to increased discomfort and then to an increase in maladaptive behaviors.  Then we spoke about how this leads to an increased fear of triggers and to cope with continued maladaptive (safety or soothing behaviors.)    We spoke mostly about how to dispute the thoughts and developing a new behavior plan of action.     Behavioral Health Daily Check In form revealed that patient is not experiencing SI/HI thoughts or plans, remaining abstinent from drugs and alcohol, and report's goal today of  \"working outside in the yard.\"      Patient's Goal:  1. Dep F 33.0  Q.  Pt will ask the question 'Is there anything I can do right now to change the past or to positively influence the future' and, if yes, take action on what she can do and report to group.    Notes:  January participated in group well and she continues to have struggles at times in holding back sharing her opinion about everything talked about.  She needed redirection to let others share or to be told that she is not aware of the whole story of what peers are talking about and wait to share her opinion.  She is able to say that she has more interest in doing things around the house and being outside and we spoke 
                                                                      Group Therapy Note    Date: 5/8/2024    Group Start Time: 11:00 AM  Group End Time: 11:50 AM  Group Topic: Psychotherapy    [facility] BEHAVIORAL TH OP    [facility]ac[facility]i S[facility]        Group Therapy Note    Attendees: 11 scheduled    Focus of session was on the article “What We All Want and Need but Can't Control” from Psychology Today.  We spoke about how much we depend on the need for approval from others and how we are prewired to do this.  We spoke about how we can try and break this need and learn to cope with others not liking us and we discussed the ideas of how to “put the self back in self-esteem.” We reviewed the suggestions about talking back to negative voices in your head, take criticism as feedback, seek approval from self, and work to not worry what other people think.       Patient's Goal:  1. Dep F 33.0  V.  “Pt will verbalize the boundaries that she wants to set with her brother and her brother's girlfriend separately and show signs that she is implementing those boundaries    Notes:  January participated in group well and was more aware of times to interject and times to let others talk.  She spoke about how she can see that she has struggled with letting go of the need \"to fix others and their issues.\" She is more able to see the beckett in that and that it is not winnable for her now, nor was it ever.      Status After Intervention:  Unchanged    Participation Level: Active Listener and Interactive    Participation Quality: Appropriate, Attentive, Sharing, and Supportive      Speech:  normal      Thought Process/Content: Logical  Linear      Affective Functioning: Congruent      Mood: anxious and depressed      Level of consciousness:  Alert, Oriented x4, and Attentive      Response to Learning: Able to verbalize current knowledge/experience, Capable of insight, and Able to change behavior      Endings: None Reported    Modes of 
the patient's condition and functional level and prevent relapse or admission to a higher level of care.    Physician Signature PRINTED Name & Credential                        Dr. Lavon Rocha M.D. Date & Time             Treatment Plan Review - Specific Goal Progress Review Date: 4/30/2024   GOAL STATUS CODES:   M = Met     C = Continued     D/C = Discontinued     R = Revised     N = New Goal   PROBLEM  NUMBER: 1 PROBLEM: Depression   STG Goal Status Comments (Progress or Lack of Progress)   Q     C   Pt will benefit from working on this skill to help her stay present to the moment and help her make good choices.  “Pt will ask the question 'Is there anything I can do right now to change the past or to positively influence the future' and, if yes, take action on what she can do and report to group.”   By 5/29/2024.     S      M Pt has been able to talk about fears and struggles with change.  We will meet this goal at this time.     T     C We will work to help pt build up her own recovery and relapse prevention plan.  “Pt will identify specific relapse triggers & develop in writing two possible coping strategies for each and share with group.”  By 5/26/2024.     U    C Pt continues to gain insight about how she often says things without thinking and we continue to help her look to manage those choices to help her maintain her well being.  “Pt will identify one time when she made an impulsive choice due to her mood being unstable and/or her thoughts being irrational and how that choice impacted her.”  By 5/26/2024.     PROBLEM  NUMBER: 1 PROBLEM: Depression   STG Goal Status Comments (Progress or Lack of Progress)   V    N “Pt will verbalize the boundaries that she wants to set with her brother and her brother's girlfriend separately and show signs that she is implementing those boundaries.”  By 5/31/2024.                                            PROBLEM  NUMBER:  PROBLEM:    STG Goal Status Comments (Progress

## 2024-05-15 ENCOUNTER — HOSPITAL ENCOUNTER (OUTPATIENT)
Facility: HOSPITAL | Age: 69
Setting detail: RECURRING SERIES
Discharge: HOME | End: 2024-05-18
Payer: MEDICARE

## 2024-05-16 ENCOUNTER — HOSPITAL ENCOUNTER (OUTPATIENT)
Facility: HOSPITAL | Age: 69
Setting detail: RECURRING SERIES
Discharge: HOME | End: 2024-05-19
Payer: MEDICARE

## 2024-05-16 PROCEDURE — 90853 GROUP PSYCHOTHERAPY: CPT

## 2024-05-16 NOTE — BH NOTE
Group Therapy Note    Date: 5/16/2024    Group Start Time: 11:00 AM  Group End Time: 11:50 AM  Group Topic: Psychotherapy    [facility] BEHAVIORAL TH OP    Brandie Montez, SHELLI        Group Therapy Note    Attendees: 12 scheduled  Focus of session was based on the article “10 Signs you are Starting to Heal.”  We spoke about the following: you no longer dwell on negative thoughts from others, you no longer feel afraid to ask for help, you are starting to feel again, you no longer beat yourself up about everything, you experience more better days that bad ones, you are gaining more control over your life, you are regaining your appetite, you no longer rely on others to make you happy, and you look forward to the future.  We spoke about where group members are and what they are noticing are positive signs of recovery.           Patient's Goal:  1Dep. F33.0 T .  “Pt will identify specific relapse triggers & develop in writing two possible coping strategies for each and share with group.”      Notes:  Pt participated in the group activity and engaged with others.  She shared ways that she feels she has made progress within the last year.  She is feeling better now that her son will be getting some assistance.  She states she is trying to make an effort moving forward to ask her brother what he needs instead of just doing what she thinks needs to be done.     Status After Intervention:  Improved    Participation Level: Active Listener and Interactive    Participation Quality: Appropriate, Attentive, Sharing, and Supportive      Speech:  normal      Thought Process/Content: Logical      Affective Functioning: Congruent      Mood: euthymic      Level of consciousness:  Alert, Oriented x4, and Attentive      Response to Learning: Able to verbalize current knowledge/experience, Able to verbalize/acknowledge new learning, Able to retain information, Capable of

## 2024-05-16 NOTE — BH NOTE
Group Therapy Note    Date: 5/16/2024    Group Start Time: 10:00 AM  Group End Time: 10:50 AM  Group Topic: Psychotherapy    [facility] BEHAVIORAL TH OP    Brandie Montez, CAMW        Group Therapy Note    Attendees: 12    Focus of session was based on information from “The Feeling Good Handbook” by Dr. Chencho Mccrary.   We spoke about questions to consider when we need to think about if we need to accept our thoughts and feelings, express our thoughts and feelings, or change them.  I went over the questions which included how long have I been feeling this way and may I be stuck, am I doing something constructive or am I simply brooding, are my thoughts and feelings realistic, and am I making myself unhappy about a situation that I cannot do anything about?  We spoke about how these questions can help lead us into a direction to process our thoughts and feelings and we spoke about how group members can get started on this and why it may be helpful to them.    Behavioral Health Daily Check In form revealed that pt is not experiencing SI/HI thoughts or plans, remaining abstinent from drugs and alcohol, and reports goal today     Patient's Goal:  1Dep. F33.0 N Pt will verbalize the boundaries that she wants to set with her brother and her brother's girlfriend separately and show signs that she is implementing those boundaries    N otes:  Pt participated in the group discussion and listened to the concerns of others.  She shared that her son did qualify for extra services and they are both happy about that. She is trying to decide if she wants to visit her brother as he is having surgery on Monday.  She is hesitant to drive as her car is having issues and she is not sure she wants to deal with his girlfriend.  She stated she has been thinking about apologizing for telling them house they should keep their house.  The group was supportive and helped her

## 2024-05-22 ENCOUNTER — HOSPITAL ENCOUNTER (OUTPATIENT)
Facility: HOSPITAL | Age: 69
Setting detail: RECURRING SERIES
Discharge: HOME | End: 2024-05-25
Payer: MEDICARE

## 2024-05-22 PROCEDURE — 99214 OFFICE O/P EST MOD 30 MIN: CPT | Performed by: PSYCHIATRY & NEUROLOGY

## 2024-05-22 PROCEDURE — 90853 GROUP PSYCHOTHERAPY: CPT

## 2024-05-22 RX ORDER — LAMOTRIGINE 100 MG/1
100 TABLET ORAL 2 TIMES DAILY
Qty: 180 TABLET | Refills: 3 | Status: SHIPPED | OUTPATIENT
Start: 2024-05-22

## 2024-05-22 RX ORDER — MIRTAZAPINE 45 MG/1
45 TABLET, FILM COATED ORAL NIGHTLY
Qty: 90 TABLET | Refills: 3 | Status: SHIPPED | OUTPATIENT
Start: 2024-05-22

## 2024-05-22 NOTE — BH NOTE
Group Therapy Note    Date: 5/22/2024    Group Start Time: 11:00 AM  Group End Time: 11:50 AM  Group Topic: Psychotherapy    [facility] BEHAVIORAL TH OP    Lacie[facility] Abi [facility]EMAN[facility]        Group Therapy Note    Attendees:  10 scheduled    Focus of session was a discussion on fear and asking the question about what we fear: is this fear protecting me or holding me back?  We spoke about many fears we have are not based on fact but rather on stories we create about what we fear or the people who are involved. We spoke about how the best strategy we can use when we are trying to push through fear is to “look for the facts” and to remember that “feelings are not facts.”  We discussed how we can identify what we fear, look for the facts, and then determine how we feel about those facts.  We spoke about how pushing through fears we can safely do something about can help us move in the right direction.         Patient's Goal:  1. Dep F 33.0  Q.  Pt will ask the question 'Is there anything I can do right now to change the past or to positively influence the future' and, if yes, take action on what she can do and report to group.”    Notes:  January participated in group with prompting.  She spoke about how she has struggled in the past with fear thoughts that have kept her stuck. She was able to share examples of how she has overcome them and how she will continue to work on recognizing these thoughts and how they may be giving her an excuse to not push herself to do something new or challenging.     Status After Intervention:  Unchanged    Participation Level: Active Listener and Interactive    Participation Quality: Appropriate, Attentive, Sharing, and Supportive      Speech:  normal      Thought Process/Content: Logical  Linear      Affective Functioning: Congruent and Flat      Mood: anxious      Level of consciousness:  Alert, Oriented x4, Attentive, and 
Congruent      Mood: euthymic      Level of consciousness:  Alert, Oriented x4, and Attentive      Response to Learning: Able to verbalize current knowledge/experience, Able to verbalize/acknowledge new learning, Able to retain information, Capable of insight, Able to change behavior, and Progressing to goal      Endings: None Reported    Modes of Intervention: Education, Support, Socialization, Exploration, Clarifying, Problem-solving, and Activity      Discipline Responsible: /Counselor      Signature:  Brandie Montez LCSW

## 2024-05-22 NOTE — GROUP NOTE
Group Therapy Note    Date: 5/22/2024    Group Start Time: 12:00 PM  Group End Time: 12:50 PM  Group Topic: Psychotherapy    [facility] BEHAVIORAL TH OP    Brandie Montez, SHELLI        Group Therapy Note    Attendees: 11 scheduled    Focus of session was based on the handout “Everything Is Awful and I'm not Okay.”  We spoke about how this handout leads us to ask questions to help us see what we can possibly do to take care of ourselves.  We spoke about the questions range from physical needs to emotional and mental needs.  We spoke about how this can be useful when we are in crisis and need some simple directions that we may be unable to even think about due to the crisis.  We spoke about how group members can use this tool and how they may be willing to use it.          Patient's Goal:  1Dep. F33.0 T .  “Pt will identify specific relapse triggers & develop in writing two possible coping strategies for each and share with group.”    Notes:  Pt participated in the group activity and followed along with the worksheet.  Pt shared that she wants to try and get outside today and not stay in her room.  Her son got bitten by a snake recently so she is somewhat concerned about working in the yard. We discussed this and the group was supportive.      Status After Intervention:  Improved    Participation Level: Active Listener and Interactive    Participation Quality: Appropriate, Attentive, Sharing, and Supportive      Speech:  normal      Thought Process/Content: Logical      Affective Functioning: Congruent      Mood: euthymic      Level of consciousness:  Alert, Oriented x4, and Attentive      Response to Learning: Able to verbalize current knowledge/experience, Able to verbalize/acknowledge new learning, Able to retain information, Capable of insight, Able to change behavior, and Progressing to goal      Endings: None Reported    Modes of Intervention:

## 2024-05-22 NOTE — PROGRESS NOTES
SOP PROGRESS NOTE    INTERVAL HISTORY/FINDINGS:  States she's been feeling \"pretty good\" affectively, and that she's not been overly depressed in a number of weeks or more. However, the increased Remeron (45 mg) has had some SE's--feels loopy after taking it and she's gained a little more weight. She feels that the increased Lamictal is the med that really has helped her, and we discussed Rx options in some detail. I'll first drop the Remeron back to 30 mg QHS, and see how she feels at that dose again. She's more animated and social and her N/V functioning has been quite good. No mood elevation or lability, at all. Also no other SE's with the 2 meds, including no rash, which we discussed again. We further discussed her Dx and the likelihood she has more of a BPAD type II than just MDD alone. Has had bad SE's with several SSRI's and was hypomanic with Wellbutrin, and Trintellix was ineffective.      MEDICATIONS:  Current Outpatient Medications   Medication Sig    ciclopirox (PENLAC) 8 % solution Apply topically nightly for 90 days to affected nails .    levothyroxine (SYNTHROID) 150 MCG tablet TAKE 1 TABLET BY MOUTH EVERY DAY    mirtazapine (REMERON) 45 MG tablet Take 1 tablet by mouth nightly    lamoTRIgine (LAMICTAL) 100 MG tablet Take 1 tablet by mouth 2 times daily    KLOR-CON M10 10 MEQ extended release tablet TAKE 1 TABLET BY MOUTH EVERY DAY    lisinopril (PRINIVIL;ZESTRIL) 20 MG tablet TAKE 1 TABLET BY MOUTH EVERY DAY FOR HIGH BLOOD PRESSURE    hydroCHLOROthiazide (HYDRODIURIL) 12.5 MG tablet TAKE 1 TABLET BY MOUTH EVERY DAY FOR BLOOD PRESSURE    albuterol sulfate HFA (PROVENTIL;VENTOLIN;PROAIR) 108 (90 Base) MCG/ACT inhaler Inhale 2 puffs into the lungs every 6 hours as needed for Wheezing    aspirin 81 MG chewable tablet Take 1 tablet by mouth daily    vitamin D (CHOLECALCIFEROL) 25 MCG (1000 UT) TABS tablet Take by mouth daily    ibuprofen (ADVIL;MOTRIN) 800 MG tablet Take 1 tablet by mouth    melatonin 5

## 2024-05-30 ENCOUNTER — HOSPITAL ENCOUNTER (OUTPATIENT)
Facility: HOSPITAL | Age: 69
Setting detail: RECURRING SERIES
End: 2024-05-30
Payer: MEDICARE

## 2024-05-30 PROCEDURE — 90853 GROUP PSYCHOTHERAPY: CPT

## 2024-05-30 NOTE — BH NOTE
Group Therapy Note    Date: 5/30/2024    Group Start Time: 11:00 AM  Group End Time: 11:50 AM  Group Topic: Psychotherapy    [facility] BEHAVIORAL TH OP    Brandie Montez, SHELLI        Group Therapy Note    Attendees: 11 scheduled    Focus of session was on effective ways to control anxiety.  We spoke about how these specific skills can have a very immediate effect on stress and anxiety and we spoke about how we can motivate to use them.  We spoke about the following skills; dealing with problems on the spot, strong, confident relationships, slowing down, taking one thing at a time, coping with ruts, divide problems up, using past experiences, and building relaxation into our daily lives.  We spoke about which skill we will start to incorporate into our daily use       Patient's Goal:  1Dep. F33.0 T .  “Pt will identify specific relapse triggers & develop in writing two possible coping strategies for each and share with group.”     Notes:  Pt shared that she is thinking about visiting her brother in June.  She stated he and his girlfriend have asked when she is coming.  Pt's son will receive his first check in June and she wants to start paying for the full rent to her brother to finally separate their financial issues.  She realizes that she has been angry with her brother and his girlfriend in the past and wants to start fresh with their relationship and be more independent.     Status After Intervention:  Improved    Participation Level: Active Listener and Interactive    Participation Quality: Appropriate, Attentive, Sharing, and Supportive      Speech:  normal      Thought Process/Content: Logical      Affective Functioning: Congruent      Mood: euthymic      Level of consciousness:  Alert, Oriented x4, and Attentive      Response to Learning: Able to verbalize current knowledge/experience, Able to verbalize/acknowledge new learning, Able to  retain information, Capable of insight, Able to change behavior, and Progressing to goal      Endings: None Reported    Modes of Intervention: Education, Support, Socialization, Exploration, Clarifying, Problem-solving, and Activity      Discipline Responsible: /Counselor      Signature:  Brandie Montez LCSW

## 2024-05-30 NOTE — BH NOTE
Group Therapy Note    Date: 5/30/2024    Group Start Time: 10:00 AM  Group End Time: 10:30 AM  Group Topic: Psychotherapy    [facility] BEHAVIORAL TH OP    Brandie Montez, SHELLI        Group Therapy Note    Attendees: 11 scheduled    Focus of session was based on “How to Love Yourself” from Brook Lopez.  We spoke about the different strategies and they  include; Stop all Criticism, Don't Scare Yourself, Be Gentle and Kind and Patient, Be Kind to Your Mind, Praise Yourself, Support Yourself, Be Milligan to your Negative, Take Care of Your Body, Mirror work, and Love Yourself and Do it Now.”  We spoke about what strategies we can use to help build up this important skill and how to have self love.      Behavioral Health Daily Check In form revealed that pt is not experiencing SI/HI thoughts or plans, remaining abstinent from drugs and alcohol, and reports goal today to stay active and take medication  Patient's Goal:  1Dep. F33.0 U “Pt will identify one time when she made an impulsive choice due to her mood being unstable and/or her thoughts being irrational and how that choice impacted her.”      Notes:  Pt shared that she has been doing well and has been active recently.  She worked in the yard and mowed grass with her son.  She stated that it feels good to get outside and not spend so much time in her room.  She stated that she feels her medication is working as she was able to manage her emotions more appropriately recently when before she would have responded impulsively and with a great deal of anger     Status After Intervention:  Improved    Participation Level: Active Listener and Interactive    Participation Quality: Appropriate, Attentive, and Sharing      Speech:  normal      Thought Process/Content: Logical      Affective Functioning: Congruent      Mood: euthymic      Level of consciousness:  Alert, Oriented x4, and Attentive      Response

## 2024-05-30 NOTE — BH NOTE
[facility] Outpatient [facility]  Group Therapy  Treatment Plan Review    TREATMENT PLAN REVIEW REVIEW DATE: 5/30/2024     summary of Ongoing issues with Symptoms/Functional Impairments Indicating Need for Continued Stay:  January has been maintaining her stability over the past month and she has been able to process her emotions and the issues and people in her life that set off anger and fear.  We are planning to discharge her at the end of next month due to her ability to maintain stability but she will work this next month on finding some outlets for herself to stay socially connected to people.  She will remain at one treatment day a week as she works to find these other outlets.       TREATMENT & DISCHARGE PLANNING CHANGES    Modality or Intervention  Changes Pt will continue at one day a week, three groups a day at this time.    Psychotropic Medication Changes Remeron decreased to 30 mg daily on 5/22/2024.     Discharge  Planning or Target Date  Changes 6/30/2024   New Issues Prepare for discharge     TREATMENT TEAM SIGNATURE / DATE   I have participated in the development of this plan of treatment and agreed to its implementation.   Client Signature PRINTED Name                       January Bricenoman Date & Time       Family / Legal Representative Signature (If Applicable) PRINTED Name & Relationship     Date & Time   Therapist Signature PRINTED Name & Credential                            So Medellin [facility] Date & Time       Therapist Signature PRINTED Name & Credential                         Genevieve Montez [facility]   Date & Time       Other Signature PRINTED Name & Credential     Date & Time   Other Signature PRINTED Name & Credential or Relationship     Date & Time       PHYSICIAN CERTIFICATION OF THE LEVEL OF CARE:  I certify that these outpatient behavioral health services are medically necessary to improve and maintain the patient's condition and functional level and prevent relapse or admission

## 2024-06-12 ENCOUNTER — HOSPITAL ENCOUNTER (OUTPATIENT)
Facility: HOSPITAL | Age: 69
Setting detail: RECURRING SERIES
Discharge: HOME | End: 2024-06-15
Payer: MEDICARE

## 2024-06-12 PROCEDURE — 90853 GROUP PSYCHOTHERAPY: CPT

## 2024-06-12 NOTE — BH NOTE
Group Therapy Note    Date: 6/12/2024    Group Start Time: 10:00 AM  Group End Time: 10:50 AM  Group Topic: Psychotherapy    [facility] BEHAVIORAL TH OP    Brandie Montez LCSW        Group Therapy Note    Attendees: 10 scheduled    Focus of session was based on a handout from [employer] on “How to Stop Taking Things Personally.”  We spoke about how we often get triggered by other people's attitudes and what we can do to manage internalizing it.  We spoke about accepting and realizing that other people's rudeness or attitude is often not about us.  We spoke about looking for small slivers of truth in what people are telling us when it is critical, and how we can take a different perspective.  We spoke about one of the biggest skills to cope with other people's attitudes or behaviors aimed towards you is that we have to remember we are not defined by what other people tell us but our self-worth is based on what we tell ourselves.  We spoke about people that group members have to deal with and how they can manage things differently.     Behavioral Health Daily Check In form revealed that pt is not experiencing SI/HI thoughts or plans, remaining abstinent from drugs and alcohol, and reports goal today medication and staying active    Patient's Goal:  1Dep. F33.0 T “Pt will identify specific relapse triggers & develop in writing two possible coping strategies for each and share with group.”    Notes:  Pt shared that she was trying to decide whether to go to her brothers as he is having surgery.  His girlfriend is in the hospital with pneumonia and pt is concerned that they will expect her to take care of them.  We reviewed our previous discussion related to her brother and the coping strategies she had used to work threw that situation. Pt states she is feeling more tired and is isolating more.  Her brother has asked her to also go to the beach  Impression: Dry eye syndrome of bilateral lacrimal glands: H04.123. Plan: For dry eye syndrome, I have recommended patient use a good quality artificial tear 4-6 times per day.

## 2024-06-12 NOTE — BH NOTE
Group Therapy Note    Date: 6/12/2024    Group Start Time: 12:00 PM  Group End Time: 12:50 PM  Group Topic: Psychotherapy    [facility] BEHAVIORAL TH OP    Brandie Montez, SHELLI        Group Therapy Note    Attendees: 10 scheduled    Focus of session was on recognizing one of the biggest challenges that we face with changing is that it means that we have to give up something that we have become very used to.  We spoke about how in order to move forward we are leaving behind old habits, past thinking patterns, former relationships, comfortable conditions, familiar attitudes, or established behaviors.  We spoke about how in order live for the present moment, we have to be willing to start a new chapter       Patient's Goal:  1Dep. F33.0 W Pt will identify outlets for socialization she will pursue to help support her need for social connection after discharge from group setting.”      Notes:  Pt participated in the group activity and discussion.  Pt shared about having to take care of many others with her relationships and that she needed a break when she moved to [place].  She has had time to reflect on her care taking roles from the past and decide what she wants for the future.     Status After Intervention:  Improved    Participation Level: Active Listener and Interactive    Participation Quality: Appropriate, Attentive, and Sharing      Speech:  normal      Thought Process/Content: Logical      Affective Functioning: Congruent      Mood: euthymic      Level of consciousness:  Alert, Oriented x4, and Attentive      Response to Learning: Able to verbalize current knowledge/experience, Able to verbalize/acknowledge new learning, Able to retain information, Capable of insight, Able to change behavior, and Progressing to goal      Endings: None Reported    Modes of Intervention: Education, Support, Socialization, Exploration, Clarifying, Problem-solving, and

## 2024-06-12 NOTE — BH NOTE
Group Therapy Note    Date: 6/12/2024    Group Start Time: 11:00 AM  Group End Time: 11:50 AM  Group Topic: Psychotherapy    [facility] [place] BEHAVIORAL TH OP    Lacie, Abi BATEMAN, LCSW        Group Therapy Note    Attendees: 10 scheduled    Focus of session was based on handout from [employer] “Grounding Techniques Menu” and when and how grounding can be a useful tool to manage anxiety.  We spoke about how grounding is effective in bringing our minds back to the present moment rather than focused on past issues or future worries.   We discussed the use of our five senses, using our body, distraction, reminders that we are safe, orienting ourselves, offer self-compassion, and using imagination in effective ways.         Patient's Goal:  1. Dep F 33.0  V.  Pt will verbalize the boundaries that she wants to set with her brother and her brother's girlfriend separately and show signs that she is implementing those boundaries    Notes:  January participated in group well and she was supportive of others and provided feedback about the use of drugs and alcohol as a coping skill.  She spoke about losing her daughter to an overdose and how it changed her life.  She was open to ideas that she can use and how they can be beneficial to her.      Status After Intervention:  Unchanged    Participation Level: Active Listener and Interactive    Participation Quality: Appropriate, Attentive, Sharing, and Supportive      Speech:  normal      Thought Process/Content: Logical  Linear      Affective Functioning: Congruent      Mood: anxious      Level of consciousness:  Alert, Oriented x4, Attentive, and Preoccupied      Response to Learning: Able to verbalize current knowledge/experience and Able to retain information      Endings: None Reported    Modes of Intervention: Education, Support, Socialization, Exploration, and Clarifying      Discipline Responsible: Social

## 2024-06-19 ENCOUNTER — HOSPITAL ENCOUNTER (OUTPATIENT)
Facility: HOSPITAL | Age: 69
Setting detail: RECURRING SERIES
Discharge: HOME | End: 2024-06-22
Payer: MEDICARE

## 2024-06-19 PROCEDURE — 90853 GROUP PSYCHOTHERAPY: CPT

## 2024-06-19 PROCEDURE — 99214 OFFICE O/P EST MOD 30 MIN: CPT | Performed by: PSYCHIATRY & NEUROLOGY

## 2024-06-19 RX ORDER — MIRTAZAPINE 15 MG/1
15 TABLET, FILM COATED ORAL NIGHTLY
Qty: 90 TABLET | Refills: 3 | Status: SHIPPED | OUTPATIENT
Start: 2024-06-19

## 2024-06-19 NOTE — PROGRESS NOTES
SOP PROGRESS NOTE    INTERVAL HISTORY/FINDINGS:  States she's been feeling \"pretty good\" still, but that she's had an issue with the Remeron. She's continued to take the 45 mg dose, but it also continues to cause some SE's--feels \"funny\", has to lie down, etc. She cut it in half for the past 5 days, but she can't tell if that's helped much or not. We discussed Rx options and I'll first drop the Remeron to 15 mg QHS, and see if there's any adverse impact from that. Additionally, we may consider increasing the Lamictal further, but I want to first see how she's doing with the lower Remeron dose. No overt depression, but her mood can be a little low at times. She's also not had any hypomania at all, or any overt anxiety, etc. We also discussed her Dx and the likelihood she has more of a BPAD type II than just MDD alone. Has had bad SE's with several SSRI's and was hypomanic with Wellbutrin, and Trintellix was ineffective.     MEDICATIONS:  Current Outpatient Medications   Medication Sig    lamoTRIgine (LAMICTAL) 100 MG tablet Take 1 tablet by mouth 2 times daily    mirtazapine (REMERON) 45 MG tablet Take 1 tablet by mouth nightly    ciclopirox (PENLAC) 8 % solution Apply topically nightly for 90 days to affected nails .    levothyroxine (SYNTHROID) 150 MCG tablet TAKE 1 TABLET BY MOUTH EVERY DAY    KLOR-CON M10 10 MEQ extended release tablet TAKE 1 TABLET BY MOUTH EVERY DAY    lisinopril (PRINIVIL;ZESTRIL) 20 MG tablet TAKE 1 TABLET BY MOUTH EVERY DAY FOR HIGH BLOOD PRESSURE    hydroCHLOROthiazide (HYDRODIURIL) 12.5 MG tablet TAKE 1 TABLET BY MOUTH EVERY DAY FOR BLOOD PRESSURE    albuterol sulfate HFA (PROVENTIL;VENTOLIN;PROAIR) 108 (90 Base) MCG/ACT inhaler Inhale 2 puffs into the lungs every 6 hours as needed for Wheezing    aspirin 81 MG chewable tablet Take 1 tablet by mouth daily    vitamin D (CHOLECALCIFEROL) 25 MCG (1000 UT) TABS tablet Take by mouth daily    ibuprofen (ADVIL;MOTRIN) 800 MG tablet Take 1 tablet

## 2024-06-19 NOTE — BH NOTE
Group Therapy Note    Date: 6/19/2024    Group Start Time: 10:00 AM  Group End Time: 10:50 AM  Group Topic: Psychotherapy    [facility] BEHAVIORAL TH OP    Brandie Montez LCSW        Group Therapy Note    Attendees: 10    Focus of session was based on ideas developed from Tha Aleman's book “Seven Habits of Highly Effective People.”  I shared the concepts and habits that include being proactive, beginning with the end in mind, putting first things first, think win/win, seek to understand than to be understood, synergize, and sharpen the saw.  We went over these concepts and how these can be developed into habits and how they can benefit  group members and improve their overall functioning.       Behavioral Health Daily Check In form revealed that pt is not experiencing SI/HI thoughts or plans, remaining abstinent from drugs and alcohol, and reports goal today to take meds and trying to stay active, working around the house  Patient's Goal:  1Dep. F33.0 V “Pt will verbalize the boundaries that she wants to set with her brother and her brother's girlfriend separately and show signs that she is implementing those boundaries.”      Notes:  Pt listened to the group discussion and the concerns of others.  She was supportive of another member regarding her son and dealing with his current living situation and disability. Pt shared that she worries about her own son and if he will be able to survive and thrive once she passes.     Status After Intervention:  Unchanged    Participation Level: Active Listener and Interactive    Participation Quality: Appropriate, Attentive, and Sharing      Speech:  normal      Thought Process/Content: Logical      Affective Functioning: Congruent      Mood: euthymic      Level of consciousness:  Alert, Oriented x4, and Attentive      Response to Learning: Able to verbalize current knowledge/experience, Able to

## 2024-06-19 NOTE — BH NOTE
Group Therapy Note    Date: 6/19/2024    Group Start Time: 11:00 AM  Group End Time: 11:50 AM  Group Topic: Psychotherapy    [facility] BEHAVIORAL TH OP    Abi Medellin, LCSW        Group Therapy Note    Attendees: 10 scheduled    Focus of session was on developing self monitoring skills to help prevent relapses and develop skills to be better able to manage circumstances and triggers to work through periods of depression, anxiety, or anibal.  We spoke about why it is important to manage and monitor symptoms as well as circumstances.  We spoke monitoring mood and were we feeling low or high so we can see if we can notice any mood fluctuations and what they may be about.  We spoke about symptom monitoring as well and when we need to take some action and/or get some intervention.  We spoke about how to plan for early intervention and be specific about what to do when symptoms are present.  We spoke about what we can do to develop specific plans of action for relapse prevention and how others around us can help.       Patient's Goal:  1. Dep F 33.0  W.  Pt will identify outlets for socialization she will pursue to help support her need for social connection after discharge from group setting.    Notes:  January participated in group well and she spoke about how she is more aware of the behaviors that impact her mood.  She spoke about how she knows that \"I was avoiding and putting off trying things suggested to me because I was so negative and despondent.\"  She shared how things changed when she began to push herself to try new things.      Status After Intervention:  Improved    Participation Level: Active Listener and Interactive    Participation Quality: Appropriate, Attentive, Sharing, and Supportive      Speech:  normal      Thought Process/Content: Logical  Linear      Affective Functioning: Congruent      Mood: anxious      Level of consciousness:

## 2024-06-19 NOTE — BH NOTE
Group Therapy Note    Date: 6/19/2024    Group Start Time: 12:00 PM  Group End Time: 12:50 PM  Group Topic: Psychotherapy    [facility] BEHAVIORAL TH OP    Brandie Montez, SHELLI        Group Therapy Note    Attendees: 10 scheduled    Focus of session was on information from article on how complaining rewires our brain for negativity and how to break that habit.  Group members were asked to share their thoughts about what it is like for them to be around someone who is consistently complaining and what that may do to them when they are consistently complaining.  We spoke about how we can start to practice of not complaining by catching ourselves when we are doing it and also recognizing when others may be doing it around us.  I shared how we can't really complain and be grateful at the same time, so focus on being grateful.  I asked group members to share their awareness of what they may be complaining about right now the most.       Patient's Goal:  1Dep. F33.0 T .  “Pt will identify specific relapse triggers & develop in writing two possible coping strategies for each and share with group.”     Notes:  Pt shared with the group that her brother is having surgery today.  She is going on Friday to stay with him until they go to the beach.  Pt has goals for herself that she is not going to verbally engage in a negative way and will try and not take over and around the house.  She states she does this to help her brother but knows it creates tension and she wants to be drama free and not feel like she has to take care of anyone anymore . Pt states she is aware that she can leave whenever she wants and does not have to go to the beach if she doesn't feel like it.     Status After Intervention:  Improved    Participation Level: Active Listener and Interactive    Participation Quality: Appropriate, Attentive, and Sharing      Speech:  normal      Thought

## 2024-06-21 RX ORDER — POTASSIUM CHLORIDE 750 MG/1
10 TABLET, EXTENDED RELEASE ORAL DAILY
Qty: 90 TABLET | Refills: 1 | Status: SHIPPED | OUTPATIENT
Start: 2024-06-21

## 2024-06-21 NOTE — TELEPHONE ENCOUNTER
Patient requesting refill on     Requested Prescriptions     Pending Prescriptions Disp Refills    KLOR-CON M10 10 MEQ extended release tablet [Pharmacy Med Name: KLOR-CON M10 TABLET] 90 tablet 1     Sig: TAKE 1 TABLET BY MOUTH EVERY DAY        Last OV 10/25/2023

## 2024-07-02 ENCOUNTER — HOSPITAL ENCOUNTER (OUTPATIENT)
Facility: HOSPITAL | Age: 69
Setting detail: RECURRING SERIES
Discharge: HOME | End: 2024-07-05
Payer: MEDICARE

## 2024-07-02 PROCEDURE — 90853 GROUP PSYCHOTHERAPY: CPT

## 2024-07-03 NOTE — BH NOTE
Group Therapy Note    Date: 7/2/2024    Group Start Time: 10:00 AM  Group End Time: 10:50 AM  Group Topic: Psychotherapy    [facility] BEHAVIORAL TH OP    Lacie, Abi BATEMAN, LCSW        Group Therapy Note    Attendees: 12 scheduled    Focus of session was based on handouts from [facility]'s “Understanding and Reducing Angry Feelings” workbook.  We spoke about understanding the roots of anger such as fear, including fear of losing face or being embarrassed, or pain, being hurt by words and attitudes.  We spoke about anger triggers in regard to family, work, friends, and with strangers in regard to pain and fear.  We also spoke about tips for managing anger including asking self if anger is justified, talk rather than act out angry feelings.     Behavioral Health Daily Check In form revealed that patient is not experiencing SI/HI thoughts or plans, remaining abstinent from drugs and alcohol, and report's goal today of  \"try to stay out today instead of going home.\"      Patient's Goal:  1. Dep F 33.0  T.  “Pt will identify specific relapse triggers & develop in writing two possible coping strategies for each and share with group.”    Notes:  January participated in group with ease.  She spoke about how she knows that she is working on understanding the anger she has about issues in her past.  She spoke about how she been lacking motivation a great deal and we spoke about how she can focus on what she can do to increase some motivation.      Status After Intervention:  Unchanged    Participation Level: Active Listener and Interactive    Participation Quality: Appropriate, Attentive, Sharing, and Supportive      Speech:  normal      Thought Process/Content: Perseverating      Affective Functioning: Congruent      Mood: anxious and depressed      Level of consciousness:  Alert, Oriented x4, and Attentive      Response to Learning: Able to verbalize current 
                                                                      Group Therapy Note    Date: 7/2/2024    Group Start Time: 11:00 AM  Group End Time: 11:50 AM  Group Topic: Psychotherapy    [facility] BEHAVIOR[facility] TH OP    Brandie Montez LCSW        Group Therapy Note    Attendees:12 scheduled    Focus of session was on identifying the difference between mindless responses and mindful responses.  We spoke about the impact that either has on our moods and our functioning.  We spoke about the benefits of mindfulness which included being non judging, based on facts, can distance self from thoughts, being in an approach mode, can let go, and are more calm and effective in any given moment.          Patient's Goal:  1Dep. F33.0 W “Pt will identify outlets for socialization she will pursue to help support her need for social connection after discharge from group setting.”      Notes:  Pt participated in the group activity and discussion.  She shared that she was with her brother when he had surgery and that he had some unexpected complications.  They were not able to go to the beach, as she had questioned in the first place.  Her brother can pay extra to reschedule but pt feels she may not go as she does not want the stress of dealing with him and his girlfriend.  Pt states is trying to take care of her mental health in this situation        Status After Intervention:  Improved    Participation Level: Active Listener and Interactive    Participation Quality: Appropriate, Attentive, and Sharing      Speech:  normal      Thought Process/Content: Logical      Affective Functioning: Congruent      Mood: euthymic      Level of consciousness:  Alert, Oriented x4, and Attentive      Response to Learning: Able to verbalize current knowledge/experience, Able to verbalize/acknowledge new learning, Able to retain information, Capable of insight, Able to change behavior, and Progressing to goal      Endings: None Reported    Modes 
[facility]  Group Therapy  Treatment Plan Review    TREATMENT PLAN REVIEW REVIEW DATE: 6/28/2024     summary of Ongoing issues with Symptoms/Functional Impairments Indicating Need for Continued Stay:  January has struggled with staying consistent with utilizing good coping strategies and tools for her mood. She has stopped remeron all together due to the side effects. She remains easily engaged in unhealthy patterns such as isolating and avoiding.  She is open to ideas presented to help her in group but is consistently struggling with following through on implementing coping skills.  We will continue to help her plan for discharge and have better implementation of healthy coping skills.        TREATMENT & DISCHARGE PLANNING CHANGES    Modality or Intervention  Changes Pt will continue at one day a week, three groups a day at this time.    Psychotropic Medication Changes Pt reports she stopped the Remeron due to side effects.     Discharge  Planning or Target Date  Changes 7/31/2024   New Issues Prepare for discharge     TREATMENT TEAM SIGNATURE / DATE   I have participated in the development of this plan of treatment and agreed to its implementation.   Client Signature PRINTED Name                       January Bermeo Date & Time       Family / Legal Representative Signature (If Applicable) PRINTED Name & Relationship     Date & Time   Therapist Signature PRINTED Name & Credential                            So Medellin LCSW Date & Time       Therapist Signature PRINTED Name & Credential                         [facility]   Date & Time       Other Signature PRINTED Name & Credential     Date & Time   Other Signature PRINTED Name & Credential or Relationship     Date & Time       PHYSICIAN CERTIFICATION OF THE LEVEL OF CARE:  I certify that these outpatient behavioral health services are medically necessary to improve and maintain the patient's condition and functional level 
Socialization, Exploration, and Clarifying      Discipline Responsible: /Counselor      Signature:  Abi Medellin LCSW    
Worker/Counselor      Signature:  [facility]

## 2024-07-19 ENCOUNTER — HOSPITAL ENCOUNTER (OUTPATIENT)
Facility: HOSPITAL | Age: 69
Setting detail: RECURRING SERIES
Discharge: HOME | End: 2024-07-22
Payer: MEDICARE

## 2024-07-19 PROCEDURE — 90853 GROUP PSYCHOTHERAPY: CPT

## 2024-07-19 NOTE — BH NOTE
Group Therapy Note    Date: 7/19/2024    Group Start Time: 11:00 AM  Group End Time: 11:50 AM  Group Topic: Psychotherapy    [facility] BEHAVIORAL TH OP    Brandie Montez, SHELLI        Group Therapy Note    Attendees: 8 scheduled    Focus of session was on identifying the rights that we all have as human beings and the need to keep these in mind when we may be struggling with communicating and relating to other people in our lives.  We spoke about how when self esteem is low and anxiety and depression may be high, we have more internal conflict over what we feel comfortable expressing to others and we run the risk of not saying anything out of fear of rejection or being seen as selfish or wrong.  We discussed the core rights that we have which include the right to say no, the right to negotiate for change, the right to ask for help and support, the right to be the final  of our own beliefs, and the right to our own experiences regardless of its different from other people's experiences.        Patient's Goal:  1Dep. F33.0 W “Pt will identify outlets for socialization she will pursue to help support her need for social connection after discharge from group setting.”      Notes:  Pt participated in the group activity and discussion.  She read aloud from the worksheet and shared that some of the concepts she felt applied to her.  She stated that she told her brother that she is not going on vacation as she is not feeling well.  She felt bad after she told him but was proud of herself for setting that boundary.  Pt has not gone to the doctor but discussed possibly calling for an appointment next week.      Status After Intervention:  Improved    Participation Level: Active Listener and Interactive    Participation Quality: Appropriate, Attentive, and Sharing      Speech:  normal      Thought Process/Content: Logical      Affective Functioning:

## 2024-07-19 NOTE — BH NOTE
Group Therapy Note    Date: 7/19/2024    Group Start Time: 12:00 PM  Group End Time: 12:50 PM  Group Topic: Psychotherapy    [facility] BEHAVIORAL TH OP    Abi Medellin, LCSW        Group Therapy Note    Attendees: 8 scheduled    Focus of session was on identifying healthy plans for the weekend.  We spoke about setting goals and intentions for two things this weekend and they are identifying at least one thing that can be done this weekend to help ourselves and one thing that we won't do this weekend to help ourselves.  I spoke with group members about the importance of picking things to do or accomplish that are not in their normal routine and are “steps up” in terms of activities or behaviors that they want to incorporate in their lives.  We also spoke about how what they choose to not do needs to be something that they have the desire to give up in order to better themselves.  I shared that if these goals can be accomplished, they can come back next week with energy and motivation to keep making positive changes.  Group members shared their personal goals and objectives for the weekend.           Patient's Goal:  1. Dep F 33.0  V.  Pt will verbalize the boundaries that she wants to set with her brother and her brother's girlfriend separately and show signs that she is implementing those boundaries    Notes:  January participated in group with prompting.  She spoke about how she is going to rest this weekend.  We spoke about how she feels guilty about not going to see her brother and we discussed how to manage that feeling.      Status After Intervention:  Unchanged    Participation Level: Active Listener and Interactive    Participation Quality: Appropriate, Attentive, Sharing, and Supportive      Speech:  normal      Thought Process/Content: Logical  Linear      Affective Functioning: Congruent and Flat      Mood: anxious and

## 2024-07-19 NOTE — BH NOTE
Group Therapy Note    Date: 7/19/2024    Group Start Time: 10:00 AM  Group End Time: 10:50 AM  Group Topic: Psychotherapy    [facility] BEHAVIORAL TH OP    Lacie, Abi BATEMAN, LCSW        Group Therapy Note    Attendees: 8 scheduled    Focus of session was on issues and emotional baggage from the past and how it may continue to be impacting our functioning today.  We discussed how we can have anger towards family members and that may have been what has protected us from getting hurt again.  I spoke with group about the need to accept other people's limitations and how if we don't accept that, then we get continuously hurt and angry towards others.  We spoke about how we have to move forward and that may include accepting a need to forgive others and to accept them as they are.  If we cannot be around them much, then that is how they take care of themselves.   Group members were asked to share something that they can let go of today from the past so they can lighten their burden one step at a time.      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  .  “Pt will identify specific relapse triggers & develop in writing two possible coping strategies for each and share with group    Notes:  January participated in group and was very talkative and difficult to redirect.  She spoke about how she had a friend who stayed with her and she is hearing what her friend is viewing about her situation.  She spoke about many issues with her son and her brother and continues to not want to leave her son and uses him as an excuse to not do things for herself.  We continue to try and help her see the impact that has on her life.      Status After Intervention:

## 2024-07-24 ENCOUNTER — HOSPITAL ENCOUNTER (OUTPATIENT)
Facility: HOSPITAL | Age: 69
Setting detail: RECURRING SERIES
Discharge: HOME | End: 2024-07-27
Payer: MEDICARE

## 2024-07-24 PROCEDURE — 90853 GROUP PSYCHOTHERAPY: CPT

## 2024-07-24 NOTE — BH NOTE
Group Therapy Note    Date: 7/24/2024    Group Start Time: 11:00 AM  Group End Time: 11:50 AM  Group Topic: Psychotherapy    [facility] BEHAVIOR[facility] TH OP    Abi Medellin, LCSW        Group Therapy Note    Attendees: 9 scheduled    Focus of session was on emotional intelligence and the skills that we can build to increase emotional strength and competence.  We spoke about the components of EI (Sherry, 1997) and they include the ability to identify and discuss emotions, ability to regulate and control emotions, ability to read, label, and interpret other people's emotions, and the ability to sustain healthy interpersonal relationships.  I addressed with group members 5 skills that they can focus on to increase EI and they include the ability to quickly reduce stress, to recognize the emotions that is being felt, the ability to connect with others using non verbal's that are healthy, the ability to use humor to deal with challenges, and the ability to resolve conflicts positively and with confidence.  Group members discussed which skill they can practice and improve upon in this upcoming week.        Patient's Goal:  1. Dep F 33.0  W. “Pt will identify outlets for socialization she will pursue to help support her need for social connection after discharge from group setting.”    Notes:  January participated in group with prompting.  She spoke about how she has been noticing more of what she has been feeling and does understand the value of making some changes in how she is doing things.  She is going to follow up with her PCP on getting checked out physically as she knows it does not make sense to \"just worry that I am dying and not doing anything about it.\"     Status After Intervention:  Unchanged    Participation Level: Active Listener and Interactive    Participation Quality: Appropriate, Attentive, Sharing, and Supportive      Speech:

## 2024-07-24 NOTE — BH NOTE
Group Therapy Note    Date: 7/24/2024    Group Start Time: 12:00 PM  Group End Time: 12:50 PM  Group Topic: Psychotherapy    [facility] BEHAVIORAL TH OP    Brandie Montez, SHELLI        Group Therapy Note    Attendees: 9 scheduled    Focus of session was focused on developing understanding of the cognitive distortion of catastrophizing.  We spoke about how this is the irrational thought that we believe that something is far worse than it actually is.  I shared how we tend to make a catastrophe out of a current situation or we can do it to future oriented events that may or may not even happen.  We identified what this pattern of thinking leads us to both emotionally and behaviorally.  Group members were asked to share recent thoughts that they can label as catastrophic thinking and we discussed challenges to those thoughts.         Patient's Goal:  1Dep. F33.0 T “Pt will identify specific relapse triggers & develop in writing two possible coping strategies for each and share with group.”      Notes:  Pt shared with the group that she had made an appointment with her doctor for August 8th.  She is going to ask for referrals for a mammogram, colonoscopy[y and an endoscopy. She wants to have these test to explore whether she has a medical condition or is depressed.  Pt has not been feeling well for a while and she finally is ready to deal with her concerns    Status After Intervention:  Improved    Participation Level: Active Listener and Interactive    Participation Quality: Appropriate, Attentive, and Sharing      Speech:  normal      Thought Process/Content: Logical      Affective Functioning: Congruent      Mood: euthymic      Level of consciousness:  Alert, Oriented x4, and Attentive      Response to Learning: Able to verbalize current knowledge/experience, Able to verbalize/acknowledge new learning, Able to retain information, Capable of insight,

## 2024-07-24 NOTE — BH NOTE
Group Therapy Note    Date: 7/24/2024    Group Start Time: 10:00 AM  Group End Time: 10:50 AM  Group Topic: Psychotherapy    [facility] BEHAVIOR[facility] TH OP    Brandie Montez LCSW        Group Therapy Note    Attendees: 9 scheduled    Focus of session was on the importance of learning how to tolerate and manage internal tension.  We spoke about how this is often where we get into trouble when we are trying to make a change.  A remedy to resolving internal tension is to find a “quick fix” which works in the short term but has long term effects and consequences.  We spoke about our quick fixes and their consequences and what are we not tolerating internally.  We discussed how we are programmed to believe we should not feel uncomfortable or in pain and we spoke how we can find the right balance of tension that can drive us towards positive change but not be too much that we are incapacitated because of it.       Behavioral Health Daily Check In form revealed that pt is not experiencing SI/HI thoughts or plans, remaining abstinent from drugs and alcohol, and reports goal today to work around the house today.    Patient's Goal:  1Dep. F33.0 V . “Pt will verbalize the boundaries that she wants to set with her brother and her brother's girlfriend separately and show signs that she is implementing those boundaries.”      Notes:  Pt listened to the group discussion and concerns expressed by others.  Pt was supportive of another member that disclosed that she recently drank.  Pt shared her own experiences and how she had tried to help her  stop drinking at one time. She acknowledged the challenges that come with addiction     Status After Intervention:  Improved    Participation Level: Active Listener    Participation Quality: Appropriate, Attentive, and Sharing      Speech:  normal      Thought Process/Content: Logical      Affective Functioning:

## 2024-07-31 ENCOUNTER — HOSPITAL ENCOUNTER (OUTPATIENT)
Facility: HOSPITAL | Age: 69
Setting detail: RECURRING SERIES
Discharge: HOME | End: 2024-08-03
Payer: MEDICARE

## 2024-07-31 PROCEDURE — 90853 GROUP PSYCHOTHERAPY: CPT

## 2024-07-31 PROCEDURE — 99214 OFFICE O/P EST MOD 30 MIN: CPT | Performed by: PSYCHIATRY & NEUROLOGY

## 2024-07-31 NOTE — BH NOTE
Group Therapy Note    Date: 7/31/2024    Group Start Time: 10:00 AM  Group End Time: 10:50 AM  Group Topic: Psychotherapy    [facility]IOR[facility]    Brandie Montez, CAMW        Group Therapy Note    Attendees: 9 scheduled    Focus of session was on information shared in the book “The Gifts of Imperfection” by Semaj Benavidez.  We spoke about the research and information author presents on shame and how to develop healthy resilience to it.  I shared with group the difference between guilt and shame and that guilt is about “I did something bad” and shame is about “I am bad.”  We discussed how this is an emotion that needs to be talked about because just talking about it can change how we live and build relationships with others.  I shared the authors information on how to build shame resilience which includes admitting that we have shame, knowing what triggers shame in terms of messages and expectations, practice critical awareness by reality checking the messages that we tell ourselves that being imperfect means being inadequate, and that we reach out and share our stories of shame.  Group members were asked to recognize recent times in which they experienced shame.     Behavioral Health Daily Check In form revealed that pt is not experiencing SI/HI thoughts or plans, remaining abstinent from drugs and alcohol, and reports goal today to make hummingbird water    Patient's Goal:  1Dep. F33.0 T Pt will identify specific relapse triggers & develop in writing two possible coping strategies for each and share with group.”      Notes:  Pt listened to the group discussion and concerns of other members. Pt was supportive of another member as she shared her new ideas regarding doing positive things for herself.  Pt was able to contribute ways that she has made adjustments for herself and her thoughts process to include being good to herself and living her

## 2024-07-31 NOTE — PROGRESS NOTES
SOP PROGRESS NOTE    INTERVAL HISTORY/FINDINGS:  States she's not been feeling that great, affectively. She stopped the Remeron completely shortly after the last appt when we dropped the dose to 15 mg to try and reduce the SE's she was having. However, after 2-3 weeks she noticed she was more dysphoric, so she just restarted the 15 mg less than a week ago, and she already feels like it's helping a bit. She still feels mildly \"sick\" after she takes it, but we discussed Rx options and she'd prefer to stay on it and see if the SE's matt. If not, she'll first drop the dose to 7.5 mg. She's tried a number of other AD's but had SE's with most of them. She's not overly depressed at this point, and her mood has been fairly stable as well. No manic or hypomanic symptoms noted, and she's not been overly depressed at all. Additionally, we may consider increasing the Lamictal further, but I want to first see how she's doing with the lower Remeron dose.       MEDICATIONS:  Current Outpatient Medications   Medication Sig    KLOR-CON M10 10 MEQ extended release tablet TAKE 1 TABLET BY MOUTH EVERY DAY    mirtazapine (REMERON) 15 MG tablet Take 1 tablet by mouth nightly    lamoTRIgine (LAMICTAL) 100 MG tablet Take 1 tablet by mouth 2 times daily    ciclopirox (PENLAC) 8 % solution Apply topically nightly for 90 days to affected nails .    levothyroxine (SYNTHROID) 150 MCG tablet TAKE 1 TABLET BY MOUTH EVERY DAY    lisinopril (PRINIVIL;ZESTRIL) 20 MG tablet TAKE 1 TABLET BY MOUTH EVERY DAY FOR HIGH BLOOD PRESSURE    hydroCHLOROthiazide (HYDRODIURIL) 12.5 MG tablet TAKE 1 TABLET BY MOUTH EVERY DAY FOR BLOOD PRESSURE    albuterol sulfate HFA (PROVENTIL;VENTOLIN;PROAIR) 108 (90 Base) MCG/ACT inhaler Inhale 2 puffs into the lungs every 6 hours as needed for Wheezing    aspirin 81 MG chewable tablet Take 1 tablet by mouth daily    vitamin D (CHOLECALCIFEROL) 25 MCG (1000 UT) TABS tablet Take by mouth daily    ibuprofen (ADVIL;MOTRIN) 800

## 2024-07-31 NOTE — BH NOTE
Group Therapy Note    Date: 7/31/2024    Group Start Time: 11:00 AM  Group End Time: 11:50 AM  Group Topic: Psychotherapy    [facility] BEHAVIORAL TH OP    Lacie, Abi BATEMAN, [facility]        Group Therapy Note    Attendees: 9 scheduled    Focus of session was on the DBT skill of radical acceptance.  I shared with patient the idea of acceptance has to happen in order for a person to move forward.  I shared the skills set which includes radical acceptance, turning the mind, and being willing.  I shared that radical acceptance is accepting reality as it is and that life can be worth  living even when there is pain.  We spoke about how pain and not accepting it leads to more suffering.  I shared that it is difficult to accept the things we don't like but it is necessary.   I asked patient to share one thing in their life that they have accepted that has led to a release of pain and suffering and something that they need to work on accepting in their life so they can move on.        Patient's Goal:  1. Dep F 33.0  W. “Pt will identify outlets for socialization she will pursue to help support her need for social connection after discharge from group setting     Notes:  January is very quiet during this session.  She was attentive and paying attention but she did not contribute to group discussion.      Status After Intervention:  Unchanged    Participation Level: Active Listener, Interactive, and Minimal    Participation Quality: Appropriate, Attentive, Sharing, and Supportive      Speech:  normal      Thought Process/Content: Logical  Linear      Affective Functioning: Congruent and Flat      Mood: depressed      Level of consciousness:  Alert, Oriented x4, and Attentive      Response to Learning: Able to verbalize current knowledge/experience and Able to retain information      Endings: None Reported    Modes of Intervention: Education, Support, Socialization,

## 2024-07-31 NOTE — BH NOTE
Group Therapy Note    Date: 7/31/2024    Group Start Time: 12:00 PM  Group End Time: 12:50 PM  Group Topic: Psychotherapy    [facility] BEHAVIORAL TH OP    Brandie Montez, SHELLI        Group Therapy Note    Attendees: 9 scheduled    Focus of session was on reasons why it is vital to allow ourselves to feel feelings and process them.  We spoke about how we are taught young that we should avoid feelings such as anger and sadness and how we live in a culture that attempts to hide from feelings. We spoke about the benefit of “leaning into” painful feelings.  We spoke about how we numb good feelings when we learn to numb negative ones, when we struggle with emotions, this often leads to more suffering, and processing and experiencing your feelings is part of having a full life.  We spoke about identifying and processing emotions is a sign of strength and what group members are willing to do right now to develop more abilities to feel and process emotions.         Patient's Goal:  1Dep. F33.0 V . “Pt will verbalize the boundaries that she wants to set with her brother and her brother's girlfriend separately and show signs that she is implementing those boundaries.”      Notes:  Pt participated in the group activity and followed along with the reading of the worksheet.  Pt continues to process her feeling related to her brother and his girlfriend.  She is trying to focus on doing things that are healthy for her and not get consumed by his needs. She has been able to distance herself recently and express to him that she is going to some medical appointments as she has not been feeling well.     Status After Intervention:  Unchanged    Participation Level: Active Listener and Interactive    Participation Quality: Appropriate, Attentive, and Sharing      Speech:  normal      Thought Process/Content: Logical      Affective Functioning: Congruent      Mood:

## 2024-08-07 ENCOUNTER — HOSPITAL ENCOUNTER (OUTPATIENT)
Facility: HOSPITAL | Age: 69
Setting detail: RECURRING SERIES
Discharge: HOME | End: 2024-08-10
Payer: MEDICARE

## 2024-08-07 PROCEDURE — 90853 GROUP PSYCHOTHERAPY: CPT

## 2024-08-07 NOTE — BH NOTE
Group Therapy Note    Date: 8/7/2024    Group Start Time: 10:00 AM  Group End Time: 10:50 AM  Group Topic: Psychotherapy    [facility] [place] BEHAVIORAL TH OP    Brandie Montez, CAMW        Group Therapy Note    Attendees: 10 scheduled    Focus of session was on information from Semaj Benavidez's book “The Gifts of Imperfection.”    I shared with group her thoughts on being our authentic selves and that is a choice we make rather than a natural born ability or characteristic.  I shared with groups Dr. Benavidez's thoughts on how to be “intentionally authentic” and the struggle that comes with the urge to make others happy and to “be whoever others need us to be.”  We spoke about the emotional and physical price we pay for being inauthentic and what is it that we need to challenge and overcome to be more authentic in our lives and our relationships.  Behavioral Health Daily Check In form revealed that pt is not experiencing SI/HI thoughts or plans, remaining abstinent from drugs and alcohol, and reports goal today to weed my garden if it doesn't rain    Patient's Goal:  1Dep. F33.0 W “Pt will identify outlets for socialization she will pursue to help support her need for social connection after discharge from group setting.”      Notes:  Pt listened to the group discussion and the concerns of others.  She was supportive of the new member and introduced herself.  She was able to share some of her experiences and how she feels group has helped her      Status After Intervention:  Improved    Participation Level: Active Listener and Interactive    Participation Quality: Appropriate, Attentive, Sharing, and Supportive      Speech:  normal      Thought Process/Content: Logical      Affective Functioning: Congruent      Mood: euthymic      Level of consciousness:  Alert, Oriented x4, and Attentive      Response to Learning: Able to verbalize current knowledge/experience,

## 2024-08-07 NOTE — BH NOTE
Group Therapy Note    Date: 8/7/2024    Group Start Time: 11:00 AM  Group End Time: 11:50 AM  Group Topic: Psychotherapy    [facility] BEHAVIORAL TH OP    Lacie, Abi BATEMAN, LCSW        Group Therapy Note    Attendees: 10 scheduled    Focus of session was based on handout from [employer] “Cognitive Restructuring Worksheet.”  I shared how this is based on the Socratic Questioning concept and helps to identify and challenge irrational thoughts.  We spoke about the process and the first step is to decide if the thought is based on evidence or opinion.  I shared the second part of process is to ask more questions that help patient find out if there are shades of grey in situation, if there can be any assuming or misinterpretation going on, or if the thought is occurring just out of habit. We spoke about how this can be a very effective journaling tool to use on a daily basis.       Patient's Goal:  1. Dep F 33.0  X.  Pt will start to show consistent effort at taking care of her physical and mental health in order to be well for herself as well as her son    Notes:  January participated in group with prompting.  She spoke about how she is struggling with her mood and she is aware that her thoughts are often not accurate.  She continues to share thoughts about how she does not care about taking care of herself but then has a lot of anxiety about her son being able to take care of himself. She is trying to stay focused on understanding how she is limiting herself and her options.      Status After Intervention:  Unchanged    Participation Level: Active Listener, Interactive, and Minimal    Participation Quality: Appropriate, Attentive, Sharing, and Supportive      Speech:  normal      Thought Process/Content: Logical  Linear      Affective Functioning: Congruent and Flat      Mood: depressed      Level of consciousness:  Oriented x4, Attentive, and

## 2024-08-07 NOTE — BH NOTE
Group Therapy Note    Date: 8/7/2024    Group Start Time: 12:00 PM  Group End Time: 12:50 PM  Group Topic: Psychotherapy    [facility]    Brandie Montez, SHELLI        Group Therapy Note    Attendees: 10 scheduled    Focus of session was on effective ways to control anxiety.  We spoke about how these specific skills can have a very immediate effect on stress and anxiety and we spoke about how we can motivate to use them.  We spoke about the following skills; dealing with problems on the spot, strong, confident relationships, slowing down, taking one thing at a time, coping with ruts, divide problems up, using past experiences, and building relaxation into our daily lives.  We spoke about which skill we will start to incorporate into our daily use.         Patient's Goal:  1Dep. F33.0 V Pt will verbalize the boundaries that she wants to set with her brother and her brother's girlfriend separately and show signs that she is implementing those boundaries.”      Notes:  Pt participated in the group activity and followed along with the worksheets discussing anxiety.  Pt shared that she still experiences anxiety related to her brother but they recently had a good interaction. He did reach out to her after returning from vacation. She saw this as positive and an effort on his part.     Status After Intervention:  Improved    Participation Level: Active Listener and Interactive    Participation Quality: Appropriate, Attentive, and Sharing      Speech:  normal      Thought Process/Content: Logical      Affective Functioning: Congruent      Mood: euthymic      Level of consciousness:  Alert, Oriented x4, and Attentive      Response to Learning: Able to verbalize current knowledge/experience      Endings: None Reported    Modes of Intervention: Education, Support, Socialization, Exploration, Clarifying, Problem-solving, and

## 2024-08-08 ENCOUNTER — OFFICE VISIT (OUTPATIENT)
Age: 69
End: 2024-08-08

## 2024-08-08 VITALS
OXYGEN SATURATION: 96 % | SYSTOLIC BLOOD PRESSURE: 138 MMHG | RESPIRATION RATE: 17 BRPM | WEIGHT: 158.6 LBS | BODY MASS INDEX: 26.42 KG/M2 | HEART RATE: 72 BPM | TEMPERATURE: 97.7 F | HEIGHT: 65 IN | DIASTOLIC BLOOD PRESSURE: 72 MMHG

## 2024-08-08 DIAGNOSIS — Z78.0 MENOPAUSE: ICD-10-CM

## 2024-08-08 DIAGNOSIS — F17.200 CURRENT EVERY DAY SMOKER: ICD-10-CM

## 2024-08-08 DIAGNOSIS — F31.81 BIPOLAR II DISORDER (HCC): ICD-10-CM

## 2024-08-08 DIAGNOSIS — I10 ESSENTIAL (PRIMARY) HYPERTENSION: ICD-10-CM

## 2024-08-08 DIAGNOSIS — Z87.891 PERSONAL HISTORY OF TOBACCO USE: ICD-10-CM

## 2024-08-08 DIAGNOSIS — E03.9 ACQUIRED HYPOTHYROIDISM: ICD-10-CM

## 2024-08-08 DIAGNOSIS — C50.411 MALIGNANT NEOPLASM OF UPPER-OUTER QUADRANT OF RIGHT FEMALE BREAST, UNSPECIFIED ESTROGEN RECEPTOR STATUS (HCC): ICD-10-CM

## 2024-08-08 DIAGNOSIS — J41.0 SIMPLE CHRONIC BRONCHITIS (HCC): ICD-10-CM

## 2024-08-08 DIAGNOSIS — E55.9 VITAMIN D DEFICIENCY, UNSPECIFIED: ICD-10-CM

## 2024-08-08 DIAGNOSIS — Z00.00 ENCOUNTER FOR MEDICARE ANNUAL WELLNESS EXAM: Primary | ICD-10-CM

## 2024-08-08 DIAGNOSIS — E53.8 VITAMIN B12 DEFICIENCY: ICD-10-CM

## 2024-08-08 DIAGNOSIS — Z12.11 SCREENING FOR MALIGNANT NEOPLASM OF COLON: ICD-10-CM

## 2024-08-08 DIAGNOSIS — F33.1 MAJOR DEPRESSIVE DISORDER, RECURRENT EPISODE, MODERATE (HCC): ICD-10-CM

## 2024-08-08 DIAGNOSIS — Z12.31 ENCOUNTER FOR SCREENING MAMMOGRAM FOR MALIGNANT NEOPLASM OF BREAST: ICD-10-CM

## 2024-08-08 PROBLEM — F11.20 OPIATE DEPENDENCE (HCC): Status: RESOLVED | Noted: 2020-08-05 | Resolved: 2024-08-08

## 2024-08-08 PROBLEM — F13.20 BENZODIAZEPINE DEPENDENCE (HCC): Status: RESOLVED | Noted: 2020-08-05 | Resolved: 2024-08-08

## 2024-08-08 SDOH — ECONOMIC STABILITY: INCOME INSECURITY: HOW HARD IS IT FOR YOU TO PAY FOR THE VERY BASICS LIKE FOOD, HOUSING, MEDICAL CARE, AND HEATING?: NOT HARD AT ALL

## 2024-08-08 SDOH — ECONOMIC STABILITY: FOOD INSECURITY: WITHIN THE PAST 12 MONTHS, THE FOOD YOU BOUGHT JUST DIDN'T LAST AND YOU DIDN'T HAVE MONEY TO GET MORE.: NEVER TRUE

## 2024-08-08 SDOH — SOCIAL STABILITY: SOCIAL INSECURITY
WITHIN THE LAST YEAR, HAVE TO BEEN RAPED OR FORCED TO HAVE ANY KIND OF SEXUAL ACTIVITY BY YOUR PARTNER OR EX-PARTNER?: NO

## 2024-08-08 SDOH — SOCIAL STABILITY: SOCIAL NETWORK: ARE YOU MARRIED, WIDOWED, DIVORCED, SEPARATED, NEVER MARRIED, OR LIVING WITH A PARTNER?: DIVORCED

## 2024-08-08 SDOH — HEALTH STABILITY: MENTAL HEALTH
STRESS IS WHEN SOMEONE FEELS TENSE, NERVOUS, ANXIOUS, OR CAN'T SLEEP AT NIGHT BECAUSE THEIR MIND IS TROUBLED. HOW STRESSED ARE YOU?: TO SOME EXTENT

## 2024-08-08 SDOH — HEALTH STABILITY: MENTAL HEALTH: HOW OFTEN DO YOU HAVE A DRINK CONTAINING ALCOHOL?: 2-4 TIMES A MONTH

## 2024-08-08 SDOH — SOCIAL STABILITY: SOCIAL NETWORK
DO YOU BELONG TO ANY CLUBS OR ORGANIZATIONS SUCH AS CHURCH GROUPS UNIONS, FRATERNAL OR ATHLETIC GROUPS, OR SCHOOL GROUPS?: NO

## 2024-08-08 SDOH — HEALTH STABILITY: MENTAL HEALTH: HOW MANY STANDARD DRINKS CONTAINING ALCOHOL DO YOU HAVE ON A TYPICAL DAY?: 1 OR 2

## 2024-08-08 SDOH — SOCIAL STABILITY: SOCIAL INSECURITY
WITHIN THE LAST YEAR, HAVE YOU BEEN KICKED, HIT, SLAPPED, OR OTHERWISE PHYSICALLY HURT BY YOUR PARTNER OR EX-PARTNER?: NO

## 2024-08-08 SDOH — ECONOMIC STABILITY: TRANSPORTATION INSECURITY
IN THE PAST 12 MONTHS, HAS LACK OF TRANSPORTATION KEPT YOU FROM MEETINGS, WORK, OR FROM GETTING THINGS NEEDED FOR DAILY LIVING?: NO

## 2024-08-08 SDOH — SOCIAL STABILITY: SOCIAL NETWORK: HOW OFTEN DO YOU GET TOGETHER WITH FRIENDS OR RELATIVES?: MORE THAN THREE TIMES A WEEK

## 2024-08-08 SDOH — HEALTH STABILITY: PHYSICAL HEALTH: ON AVERAGE, HOW MANY DAYS PER WEEK DO YOU ENGAGE IN MODERATE TO STRENUOUS EXERCISE (LIKE A BRISK WALK)?: 0 DAYS

## 2024-08-08 SDOH — SOCIAL STABILITY: SOCIAL NETWORK
IN A TYPICAL WEEK, HOW MANY TIMES DO YOU TALK ON THE PHONE WITH FAMILY, FRIENDS, OR NEIGHBORS?: MORE THAN THREE TIMES A WEEK

## 2024-08-08 SDOH — SOCIAL STABILITY: SOCIAL INSECURITY: WITHIN THE LAST YEAR, HAVE YOU BEEN HUMILIATED OR EMOTIONALLY ABUSED IN OTHER WAYS BY YOUR PARTNER OR EX-PARTNER?: NO

## 2024-08-08 SDOH — ECONOMIC STABILITY: FOOD INSECURITY: WITHIN THE PAST 12 MONTHS, YOU WORRIED THAT YOUR FOOD WOULD RUN OUT BEFORE YOU GOT MONEY TO BUY MORE.: NEVER TRUE

## 2024-08-08 SDOH — SOCIAL STABILITY: SOCIAL NETWORK: HOW OFTEN DO YOU ATTENT MEETINGS OF THE CLUB OR ORGANIZATION YOU BELONG TO?: NEVER

## 2024-08-08 SDOH — HEALTH STABILITY: PHYSICAL HEALTH: ON AVERAGE, HOW MANY MINUTES DO YOU ENGAGE IN EXERCISE AT THIS LEVEL?: 0 MIN

## 2024-08-08 SDOH — ECONOMIC STABILITY: HOUSING INSECURITY
IN THE LAST 12 MONTHS, WAS THERE A TIME WHEN YOU DID NOT HAVE A STEADY PLACE TO SLEEP OR SLEPT IN A SHELTER (INCLUDING NOW)?: NO

## 2024-08-08 SDOH — ECONOMIC STABILITY: INCOME INSECURITY: IN THE LAST 12 MONTHS, WAS THERE A TIME WHEN YOU WERE NOT ABLE TO PAY THE MORTGAGE OR RENT ON TIME?: NO

## 2024-08-08 SDOH — SOCIAL STABILITY: SOCIAL NETWORK: HOW OFTEN DO YOU ATTEND CHURCH OR RELIGIOUS SERVICES?: NEVER

## 2024-08-08 SDOH — SOCIAL STABILITY: SOCIAL INSECURITY: WITHIN THE LAST YEAR, HAVE YOU BEEN AFRAID OF YOUR PARTNER OR EX-PARTNER?: NO

## 2024-08-08 SDOH — ECONOMIC STABILITY: TRANSPORTATION INSECURITY
IN THE PAST 12 MONTHS, HAS THE LACK OF TRANSPORTATION KEPT YOU FROM MEDICAL APPOINTMENTS OR FROM GETTING MEDICATIONS?: NO

## 2024-08-08 ASSESSMENT — PATIENT HEALTH QUESTIONNAIRE - PHQ9
SUM OF ALL RESPONSES TO PHQ QUESTIONS 1-9: 0
5. POOR APPETITE OR OVEREATING: NOT AT ALL
8. MOVING OR SPEAKING SO SLOWLY THAT OTHER PEOPLE COULD HAVE NOTICED. OR THE OPPOSITE, BEING SO FIGETY OR RESTLESS THAT YOU HAVE BEEN MOVING AROUND A LOT MORE THAN USUAL: NOT AT ALL
4. FEELING TIRED OR HAVING LITTLE ENERGY: NOT AT ALL
10. IF YOU CHECKED OFF ANY PROBLEMS, HOW DIFFICULT HAVE THESE PROBLEMS MADE IT FOR YOU TO DO YOUR WORK, TAKE CARE OF THINGS AT HOME, OR GET ALONG WITH OTHER PEOPLE: NOT DIFFICULT AT ALL
3. TROUBLE FALLING OR STAYING ASLEEP: NOT AT ALL
1. LITTLE INTEREST OR PLEASURE IN DOING THINGS: NOT AT ALL
SUM OF ALL RESPONSES TO PHQ QUESTIONS 1-9: 0
SUM OF ALL RESPONSES TO PHQ9 QUESTIONS 1 & 2: 0
7. TROUBLE CONCENTRATING ON THINGS, SUCH AS READING THE NEWSPAPER OR WATCHING TELEVISION: NOT AT ALL
9. THOUGHTS THAT YOU WOULD BE BETTER OFF DEAD, OR OF HURTING YOURSELF: NOT AT ALL
2. FEELING DOWN, DEPRESSED OR HOPELESS: NOT AT ALL
6. FEELING BAD ABOUT YOURSELF - OR THAT YOU ARE A FAILURE OR HAVE LET YOURSELF OR YOUR FAMILY DOWN: NOT AT ALL

## 2024-08-08 NOTE — PATIENT INSTRUCTIONS
instruction, always ask your healthcare professional. [employer] disclaims any warranty or liability for your use of this information.           Learning About Being Active as an Older Adult  Why is being active important as you get older?     Being active is one of the best things you can do for your health. And it's never too late to start. Being active--or getting active, if you aren't already--has definite benefits. It can:  Give you more energy,  Keep your mind sharp.  Improve balance to reduce your risk of falls.  Help you manage chronic illness with fewer medicines.  No matter how old you are, how fit you are, or what health problems you have, there is a form of activity that will work for you. And the more physical activity you can do, the better your overall health will be.  What kinds of activity can help you stay healthy?  Being more active will make your daily activities easier. Physical activity includes planned exercise and things you do in daily life. There are four types of activity:  Aerobic.  Doing aerobic activity makes your heart and lungs strong.  Includes walking, dancing, and gardening.  Aim for at least 2½ hours spread throughout the week.  It improves your energy and can help you sleep better.  Muscle-strengthening.  This type of activity can help maintain muscle and strengthen bones.  Includes climbing stairs, using resistance bands, and lifting or carrying heavy loads.  Aim for at least twice a week.  It can help protect the knees and other joints.  Stretching.  Stretching gives you better range of motion in joints and muscles.  Includes upper arm stretches, calf stretches, and gentle yoga.  Aim for at least twice a week, preferably after your muscles are warmed up from other activities.  It can help you function better in daily life.  Balancing.  This helps you stay coordinated and have good posture.  Includes heel-to-toe walking, chuck chi, and certain types of yoga.  Aim for

## 2024-08-08 NOTE — PROGRESS NOTES
\"Have you been to the ER, urgent care clinic since your last visit?  Hospitalized since your last visit?\"    NO    “Have you seen or consulted any other health care providers outside of [facility] since your last visit?”    NO    Have you had a mammogram?”   NO    Date of last Mammogram: 12/13/2021         “Have you had a colorectal cancer screening such as a colonoscopy/FIT/Cologuard?    NO    No colonoscopy on file  No cologuard on file  No FIT/FOBT on file   No flexible sigmoidoscopy on file         Click Here for Release of Records Request  
25 MCG (1000 UT) TABS tablet Take by mouth daily  Automatic Reconciliation, Ar   ibuprofen (ADVIL;MOTRIN) 800 MG tablet Take 1 tablet by mouth  Automatic Reconciliation, Ar   melatonin 5 MG TABS tablet Take by mouth  Automatic Reconciliation, Ar       CareTeam (Including outside providers/suppliers regularly involved in providing care):   Patient Care Team:  Dannielle Niño APRN - NP as PCP - General  Dannielle Niño APRN - NP as PCP - Empaneled Provider  Sunday Ang MD as Physician  Senia Modi MD as Surgeon      Reviewed and updated this visit:  Tobacco  Allergies  Meds  Problems  Med Hx  Surg Hx  Soc Hx  Fam Hx

## 2024-08-13 ENCOUNTER — LAB (OUTPATIENT)
Age: 69
End: 2024-08-13

## 2024-08-13 DIAGNOSIS — E53.8 VITAMIN B12 DEFICIENCY: ICD-10-CM

## 2024-08-13 DIAGNOSIS — E55.9 VITAMIN D DEFICIENCY, UNSPECIFIED: ICD-10-CM

## 2024-08-13 DIAGNOSIS — I10 ESSENTIAL (PRIMARY) HYPERTENSION: ICD-10-CM

## 2024-08-13 DIAGNOSIS — F17.200 CURRENT EVERY DAY SMOKER: ICD-10-CM

## 2024-08-13 DIAGNOSIS — E03.9 ACQUIRED HYPOTHYROIDISM: ICD-10-CM

## 2024-08-14 ENCOUNTER — HOSPITAL ENCOUNTER (OUTPATIENT)
Facility: HOSPITAL | Age: 69
Setting detail: RECURRING SERIES
Discharge: HOME | End: 2024-08-17
Payer: MEDICARE

## 2024-08-14 LAB
25(OH)D3 SERPL-MCNC: 37.5 NG/ML (ref 30–100)
ALBUMIN SERPL-MCNC: 4 G/DL (ref 3.5–5)
ALBUMIN/GLOB SERPL: 1.1 (ref 1.1–2.2)
ALP SERPL-CCNC: 76 U/L (ref 45–117)
ALT SERPL-CCNC: 15 U/L (ref 12–78)
ANION GAP SERPL CALC-SCNC: 5 MMOL/L (ref 5–15)
AST SERPL-CCNC: 13 U/L (ref 15–37)
BASOPHILS # BLD: 0.1 K/UL (ref 0–0.1)
BASOPHILS NFR BLD: 1 % (ref 0–1)
BILIRUB SERPL-MCNC: 0.6 MG/DL (ref 0.2–1)
BUN SERPL-MCNC: 9 MG/DL (ref 6–20)
BUN/CREAT SERPL: 12 (ref 12–20)
CALCIUM SERPL-MCNC: 9.8 MG/DL (ref 8.5–10.1)
CHLORIDE SERPL-SCNC: 105 MMOL/L (ref 97–108)
CHOLEST SERPL-MCNC: 200 MG/DL
CO2 SERPL-SCNC: 30 MMOL/L (ref 21–32)
CREAT SERPL-MCNC: 0.76 MG/DL (ref 0.55–1.02)
DIFFERENTIAL METHOD BLD: NORMAL
EOSINOPHIL # BLD: 0.1 K/UL (ref 0–0.4)
EOSINOPHIL NFR BLD: 1 % (ref 0–7)
ERYTHROCYTE [DISTWIDTH] IN BLOOD BY AUTOMATED COUNT: 13.5 % (ref 11.5–14.5)
GLOBULIN SER CALC-MCNC: 3.6 G/DL (ref 2–4)
GLUCOSE SERPL-MCNC: 93 MG/DL (ref 65–100)
HCT VFR BLD AUTO: 46.4 % (ref 35–47)
HDLC SERPL-MCNC: 72 MG/DL
HDLC SERPL: 2.8 (ref 0–5)
HGB BLD-MCNC: 15.1 G/DL (ref 11.5–16)
IMM GRANULOCYTES # BLD AUTO: 0 K/UL (ref 0–0.04)
IMM GRANULOCYTES NFR BLD AUTO: 0 % (ref 0–0.5)
LDLC SERPL CALC-MCNC: 113.2 MG/DL (ref 0–100)
LYMPHOCYTES # BLD: 2.3 K/UL (ref 0.8–3.5)
LYMPHOCYTES NFR BLD: 27 % (ref 12–49)
MCH RBC QN AUTO: 32.1 PG (ref 26–34)
MCHC RBC AUTO-ENTMCNC: 32.5 G/DL (ref 30–36.5)
MCV RBC AUTO: 98.5 FL (ref 80–99)
MONOCYTES # BLD: 0.6 K/UL (ref 0–1)
MONOCYTES NFR BLD: 7 % (ref 5–13)
NEUTS SEG # BLD: 5.5 K/UL (ref 1.8–8)
NEUTS SEG NFR BLD: 64 % (ref 32–75)
NRBC # BLD: 0 K/UL (ref 0–0.01)
NRBC BLD-RTO: 0 PER 100 WBC
PLATELET # BLD AUTO: 228 K/UL (ref 150–400)
PMV BLD AUTO: 9.5 FL (ref 8.9–12.9)
POTASSIUM SERPL-SCNC: 3.5 MMOL/L (ref 3.5–5.1)
PROT SERPL-MCNC: 7.6 G/DL (ref 6.4–8.2)
RBC # BLD AUTO: 4.71 M/UL (ref 3.8–5.2)
SODIUM SERPL-SCNC: 140 MMOL/L (ref 136–145)
TRIGL SERPL-MCNC: 74 MG/DL
TSH SERPL DL<=0.05 MIU/L-ACNC: 1.05 UIU/ML (ref 0.36–3.74)
VIT B12 SERPL-MCNC: 1453 PG/ML (ref 193–986)
VLDLC SERPL CALC-MCNC: 14.8 MG/DL
WBC # BLD AUTO: 8.6 K/UL (ref 3.6–11)

## 2024-08-14 PROCEDURE — 90853 GROUP PSYCHOTHERAPY: CPT

## 2024-08-14 NOTE — BH NOTE
Group Therapy Note    Date: 8/14/2024    Group Start Time: 11:00 AM  Group End Time: 11:50 AM  Group Topic: Psychotherapy    [facility] BEHAVIORAL TH OP    Abi Medellin[facility]        Group Therapy Note    Attendees: 10 scheduled    Focus of session was on discussing three different types of communication and identifying which ones we may engage in and the success it gives us.  We spoke about what passive and aggressive communication is and how it proves to be ineffective for us to get what we need or want or to make our relationships better and healthier.   We spoke about what we need to be assertive about can be broken down into different categories such as how to say no at the right time in the right way, telling people how you feel, or to get information or clarification of what you need or want.  We spoke about what improvements can be made in our communication patterns to enrich our well being and our relationships.         Patient's Goal:  1. Dep F 33.0  X.  Pt will start to show consistent effort at taking care of her physical and mental health in order to be well for herself as well as her son.    Notes:  January participated in group and she spoke about how she has been saying no when she has wanted to at this point in her life.  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 She was able to say no to her brother about his invitation to go away on a trip with him and his girlfriend and she waited it out for him to accept that she was saying no.      Status After Intervention:  Improved    Participation Level: Active Listener and Interactive    Participation Quality: Appropriate, Attentive, Sharing, and Supportive      Speech:  normal      Thought Process/Content: Logical  Linear      Affective Functioning: Congruent      Mood: anxious and depressed      Level of consciousness:

## 2024-08-14 NOTE — BH NOTE
Group Therapy Note    Date: 8/14/2024    Group Start Time: 12:00 PM  Group End Time: 12:50 PM  Group Topic: Psychotherapy    [facility] BEHAVIORAL TH OP    Brandie Montez, CAMW        Group Therapy Note    Attendees: 10 scheduled    Focus of session was on identifying why questions we may be asking ourselves and how it impacts our emotional well being.  I shared with group about how we often cannot answer why questions and we spend a lot of energy and create a lot of emotions trying to answer them.  We spoke about some common why questions including “why me, why did this happen to me, why am I feeling this way, why am I thinking this?”  I shared with group the need to focus on what they need to do to take care of themselves in that moment and move away from spending any more time on the why questions that we ask.  I shared that the focus on why is focusing on the source where focusing on what focuses on solutions.         Patient's Goal:  1Dep. F33.0 V . “Pt will verbalize the boundaries that she wants to set with her brother and her brother's girlfriend separately and show signs that she is implementing those boundaries.”      Notes:  Pt participated in the group activity and discussion.  She shared that she struggles with self compassion but has always had compassion for others.  Since moving to [place]  she knows she has been depressed and that has kept her from taking care of herself.  She is feeling less depressed at this time and has started to take steps to  start being good to herself and her feelings     Status After Intervention:  Improved    Participation Level: Active Listener and Interactive    Participation Quality: Appropriate, Attentive, and Sharing      Speech:  normal      Thought Process/Content: Logical      Affective Functioning: Congruent      Mood: euthymic      Level of consciousness:  Alert, Oriented x4, and

## 2024-08-14 NOTE — BH NOTE
Group Therapy Note    Date: 8/14/2024    Group Start Time: 10:00 AM  Group End Time: 10:50 AM  Group Topic: Psychotherapy    [facility] BEHAVIORAL TH OP    Brandie Montez LCSW        Group Therapy Note    Attendees: 10 scheduled   Focus of session was on identifying what we are doing well and what we can identify that we will benefit from continuing to do in regards to coping with our emotions and responding to them.  We also spoke about what we are doing that we know is not helping in our process of recovery and is even setting us back.  We spoke how we feel when we are successful in implementing a change and how we can tap into that to try and increase our motivation to continue to change.      Behavioral Health Daily Check In form revealed that pt is not experiencing SI/HI thoughts or plans, remaining abstinent from drugs and alcohol, and reports goal today to be very active with the house and yard again today    Patient's Goal:  1Dep. F33.0 W .  “Pt will identify outlets for socialization she will pursue to help support her need for social connection after discharge from group setting.”      Notes:  Pt shared with the group that she was pleased with herself as she went to the doctor and had blood work completed.  She also has a mammogram, bone density and chest X-ray scheduled. Pt stated that in the past she has been diligent about taking care of herself and knows she has not for the past 2 years.  She is anxious to complete her appointments and get her results    Status After Intervention:  Improved    Participation Level: Active Listener and Interactive    Participation Quality: Appropriate, Attentive, Sharing, and Supportive      Speech:  normal      Thought Process/Content: Logical      Affective Functioning: Congruent      Mood: euthymic      Level of consciousness:  Alert, Oriented x4, and Attentive      Response to Learning: Able to

## 2024-08-21 ENCOUNTER — HOSPITAL ENCOUNTER (OUTPATIENT)
Facility: HOSPITAL | Age: 69
Discharge: HOME | End: 2024-08-24
Payer: MEDICARE

## 2024-08-21 DIAGNOSIS — C50.411 MALIGNANT NEOPLASM OF UPPER-OUTER QUADRANT OF RIGHT FEMALE BREAST, UNSPECIFIED ESTROGEN RECEPTOR STATUS (HCC): ICD-10-CM

## 2024-08-21 DIAGNOSIS — Z12.31 ENCOUNTER FOR SCREENING MAMMOGRAM FOR MALIGNANT NEOPLASM OF BREAST: ICD-10-CM

## 2024-08-21 DIAGNOSIS — Z78.0 MENOPAUSE: ICD-10-CM

## 2024-08-21 PROCEDURE — 71271 CT THORAX LUNG CANCER SCR C-: CPT

## 2024-08-21 PROCEDURE — 77063 BREAST TOMOSYNTHESIS BI: CPT

## 2024-08-21 PROCEDURE — 77080 DXA BONE DENSITY AXIAL: CPT

## 2024-08-30 ENCOUNTER — HOSPITAL ENCOUNTER (OUTPATIENT)
Facility: HOSPITAL | Age: 69
Setting detail: RECURRING SERIES
End: 2024-08-30
Payer: MEDICARE

## 2024-08-30 PROCEDURE — 90853 GROUP PSYCHOTHERAPY: CPT

## 2024-09-04 ENCOUNTER — HOSPITAL ENCOUNTER (OUTPATIENT)
Facility: HOSPITAL | Age: 69
Setting detail: RECURRING SERIES
Discharge: HOME | End: 2024-09-07
Payer: MEDICARE

## 2024-09-04 PROCEDURE — 90853 GROUP PSYCHOTHERAPY: CPT

## 2024-09-04 PROCEDURE — 99214 OFFICE O/P EST MOD 30 MIN: CPT | Performed by: PSYCHIATRY & NEUROLOGY

## 2024-09-04 NOTE — BH NOTE
Group Therapy Note    Date: 8/30/2024    Group Start Time: 10:00 AM  Group End Time: 10:50 AM  Group Topic: Psychotherapy    [facility] BEHAVIORAL TH OP    Lacie, Abi BATEMAN[facility]        Group Therapy Note    Attendees: 11 scheduled    Focus of session was on the DBT skill of radical acceptance.  I shared with patient the idea of acceptance has to happen in order for a person to move forward.  I shared the skills set which includes radical acceptance, turning the mind, and being willing.  I shared that radical acceptance is accepting reality as it is and that life can be worth  living even when there is pain.  We spoke about how pain and not accepting it leads to more suffering.  I shared that it is difficult to accept the things we don't like but it is necessary.   I asked patient to share one thing in their life that they have accepted that has led to a release of pain and suffering and something that they need to work on accepting in their life so they can move on.   Behavioral Health Daily Check In form revealed that patient is not experiencing SI/HI thoughts or plans, remaining abstinent from drugs and alcohol, and report's goal today of  \"to work around the house.\"        Patient's Goal:  1. Dep F 33.0  X.  Pt will start to show consistent effort at taking care of her physical and mental health in order to be well for herself as well as her son    Notes:  January participated in group with prompting.  She spoke about how she knows that she has been doing what \"I need to be doing and getting tests done to see if anything is wrong with me physically.\"  She spoke about how things have all come out fine so far.  She spoke about how she knows she has to accept issues in regards to her son and his current issues and limitations.  She also spoke about awareness about her brother and how he appears to be different towards her since she would not go on a 
                                                                      Group Therapy Note    Date: 8/30/2024    Group Start Time: 11:00 AM  Group End Time: 11:50 AM  Group Topic: Psychotherapy    [facility]AVIORAL TH OP    [facility]ell[facility]        Group Therapy Note    Attendees: 11 scheduled    Focus of session was on identifying healthy plans for the weekend.  We spoke about setting goals and intentions for two things this weekend and they are identifying at least one thing that can be done this weekend to help ourselves and one thing that we won't do this weekend to help ourselves.  I spoke with group members about the importance of picking things to do or accomplish that are not in their normal routine and are “steps up” in terms of activities or behaviors that they want to incorporate in their lives.  We also spoke about how what they choose to not do needs to be something that they have the desire to give up in order to better themselves.  I shared that if these goals can be accomplished, they can come back next week with energy and motivation to keep making positive changes.  Group members shared their personal goals and objectives for the weekend.           Patient's Goal:  1 Dep F 33.0  W.  “Pt will identify outlets for socialization she will pursue to help support her need for social connection after discharge from group setting.”    Notes:  January participated in group with prompting.  She was easy to engage in group.  She spoke about how she will continue to work on accepting what she can and cannot change.  She spoke about how she is trying to be more active at home but has not yet become more active in engaging in the community in some way.      Status After Intervention:  Improved    Participation Level: Active Listener and Interactive    Participation Quality: Appropriate, Attentive, Sharing, and Supportive      Speech:  normal      Thought Process/Content: Logical  Linear      Affective 
                Dr. Lavon Rocha M.D. Date & Time             Treatment Plan Review - Specific Goal Progress Review Date: 8/30/2024   GOAL STATUS CODES:   M = Met     C = Continued     D/C = Discontinued     R = Revised     N = New Goal   PROBLEM  NUMBER: 1 PROBLEM: Depression   STG Goal Status Comments (Progress or Lack of Progress)   V      C We will monitor pts progress towards this goal.  “Pt will verbalize the boundaries that she wants to set with her brother and her brother's girlfriend separately and show signs that she is implementing those boundaries.”  By 9/30/2024.     W    C    We will continue to encourage pt to work on this goal and make connections to help her have success with discharge. “Pt will identify outlets for socialization she will pursue to help support her need for social connection after discharge from group setting.”  By 9/30/2024.    X     C We will continue to monitor pts progress towards this goal.  “Pt will start to show consistent effort at taking care of her physical and mental health in order to be well for herself as well as her son.”  By 9/30/2024.                PROBLEM  NUMBER:  PROBLEM:    STG Goal Status Comments (Progress or Lack of Progress)                                                  PROBLEM  NUMBER:  PROBLEM:    STG Goal Status Comments (Progress or Lack of Progress)                                        []- Check ONLY if Problem/Goal Progress Comments  Continued on Another Page

## 2024-09-04 NOTE — BH NOTE
Group Therapy Note    Date: 9/4/2024    Group Start Time: 10:00 AM  Group End Time: 10:50 AM  Group Topic: Psychotherapy    [facility] BEHAVIORAL TH OP    Brandie Montez LCSW        Group Therapy Note    Attendees: 10 scheduled     Focus of session was based on article “12 Reasons You Should Never Regret Any Decision You Ever Make.”  We went over these 12 reasons which include making a decision gives us opportunity to take credit for creating our own life, experiencing disappointment that might come can propel us forward to a new level, bad decisions let us practice self-forgiveness, and that we will not make mistakes, only have experiences.  We spoke about what the struggle is right now in making decisions and what this list can help them do or how to change their thinking about making decisions.  We spoke about past decisions and the work needed to recover from them if necessary and how to move forward in deciding for themselves.       Behavioral Health Daily Check In form revealed that pt is not experiencing SI/HI thoughts or plans, remaining abstinent from drugs and alcohol, and reports goal today to  rest.  I cut the whole front yard by myself  Patient's Goal:  1Dep. F33.0 X “Pt will start to show consistent effort at taking care of her physical and mental health in order to be well for herself as well as her son.”      Notes:  Pt shared that she has continued to complete her doctor appointments and has two more to go .  She feels she has worked hard toward her goal of this and conquering her fears.  She is relieved at her results and glad she has done it.  Pt continues to be concerned that she has not heard from her brother and a is discussing strategies to help her most effectively communicate with him.      Status After Intervention:  Improved    Participation Level: Active Listener and Interactive    Participation Quality: Appropriate,

## 2024-09-04 NOTE — BH NOTE
Group Therapy Note    Date: 9/4/2024    Group Start Time: 11:00 AM  Group End Time: 11:50 AM  Group Topic: Psychotherapy    [facility] BEHAVIORAL [facility] OP    Lacie, Abi S[facility]        Group Therapy Note    Attendees: 10 scheduled     Focus of session was based on handout from book “The CBT Toolbox” by Dr. Americo Bustos.  We went over “Progress, Not Perfection.”  We spoke about the connections between expecting perfection from ourselves and how it can come from low self-esteem and self-worth.  We spoke about how it is important to take note of steps forward in progress instead of some goal that is expecting perfection or something unreasonable. We spoke about current issues that patient is making progress on and how that feels and letting go of any need for perfection in what is accomplished.          Patient's Goal:  1. Dep F 33.0  V. “Pt will verbalize the boundaries that she wants to set with her brother and her brother's girlfriend separately and show signs that she is implementing those boundaries    Notes:  January participated in group well and she spoke about how she is aware of the need to focus on saying no and being okay with not being the caretaker to others, especially to her brother.  She spoke about how she still has not heard from her brother after she did not go on the trip he wanted to have her come on with him and his girlfriend.  We spoke about what she can do to maintain her anxiety about it and that she does plan to let him know she is open to coming up for a visit next weekend and seeing what his schedule is.      Status After Intervention:  Improved    Participation Level: Active Listener and Interactive    Participation Quality: Appropriate, Attentive, Sharing, and Supportive      Speech:  normal      Thought Process/Content: Logical  Linear  Perseverating      Affective Functioning: Congruent      Mood: anxious      Level of

## 2024-09-04 NOTE — BH NOTE
Group Therapy Note    Date: 9/4/2024    Group Start Time: 12:00 PM  Group End Time: 12:50 PM  Group Topic: Psychotherapy    [facility] BEHAVIORAL TH OP    Brandie Montez, SHELLI        Group Therapy Note    Attendees: 10 scheduled     Focus of session was on article “5 tips for Unsticking the Inadequate Button” by Armond Ybarra.  We spoke about how there are certain people and certain behaviors that just set off triggers in us and push our buttons.  We spoke about how there is a certain button that can trigger inadequacy and what may set that off.  We also spoke about being around people who complain a great deal and are not willing to do the work to change.  We spoke about some skills to manage when our buttons are pushed such as acknowledging the feelings and thoughts, being willing to talk about it, journaling, and seeing what we can change.  I asked group members to share their most challenging button that gets pushed right now and what they think they can do about it.        Patient's Goal:  1Dep. F33.0 W .  “Pt will identify outlets for socialization she will pursue to help support her need for social connection after discharge from group setting.”      Notes:  Pt participated int he group activity and discussion. She continued to support another member in discussing communication with her .  She shared some her experiences of her first marriage and how they went through different phases individually and as a couple .  The other member was receptive and was also able to reflect on her own communication style       Status After Intervention:  Improved    Participation Level: Active Listener and Interactive    Participation Quality: Appropriate, Attentive, Sharing, and Supportive      Speech:  normal      Thought Process/Content: Logical      Affective Functioning: Congruent      Mood: euthymic      Level of consciousness:  Alert,

## 2024-09-04 NOTE — PROGRESS NOTES
SOP PROGRESS NOTE    INTERVAL HISTORY/FINDINGS:  States she's been able to tolerate being back on the Remeron at 15 mg, and that her overall mood/affect has been fairly good. She still has brief moments of dysphoria or anxiety, but they're short-lived and not that severe. Her general mood has been euthymic and stable, and she's not had any mood lability or hypomanic symptoms at all. She's been very compliant with the meds and she denies having any SE's with them now. Her N/V functioning has been quite good as well--sleeping and eating well, energy is good, and she's not been anhedonic or dysphoric hardly at all. Discussed Rx plans and she wants to stay on this regimen. Discussed F/U plans of seeing me once she graduates from the [facility] We may consider increasing the Lamictal further, that's not needed presently.     MEDICATIONS:  Current Outpatient Medications   Medication Sig    KLOR-CON M10 10 MEQ extended release tablet TAKE 1 TABLET BY MOUTH EVERY DAY    mirtazapine (REMERON) 15 MG tablet Take 1 tablet by mouth nightly    lamoTRIgine (LAMICTAL) 100 MG tablet Take 1 tablet by mouth 2 times daily    ciclopirox (PENLAC) 8 % solution Apply topically nightly for 90 days to affected nails .    levothyroxine (SYNTHROID) 150 MCG tablet TAKE 1 TABLET BY MOUTH EVERY DAY    lisinopril (PRINIVIL;ZESTRIL) 20 MG tablet TAKE 1 TABLET BY MOUTH EVERY DAY FOR HIGH BLOOD PRESSURE    hydroCHLOROthiazide (HYDRODIURIL) 12.5 MG tablet TAKE 1 TABLET BY MOUTH EVERY DAY FOR BLOOD PRESSURE    albuterol sulfate HFA (PROVENTIL;VENTOLIN;PROAIR) 108 (90 Base) MCG/ACT inhaler Inhale 2 puffs into the lungs every 6 hours as needed for Wheezing    aspirin 81 MG chewable tablet Take 1 tablet by mouth daily    vitamin D (CHOLECALCIFEROL) 25 MCG (1000 UT) TABS tablet Take by mouth daily    ibuprofen (ADVIL;MOTRIN) 800 MG tablet Take 1 tablet by mouth    melatonin 5 MG TABS tablet Take by mouth     No current facility-administered medications for this

## 2024-09-11 ENCOUNTER — HOSPITAL ENCOUNTER (OUTPATIENT)
Facility: HOSPITAL | Age: 69
Setting detail: RECURRING SERIES
Discharge: HOME | End: 2024-09-14
Payer: MEDICARE

## 2024-09-11 PROCEDURE — 90853 GROUP PSYCHOTHERAPY: CPT

## 2024-09-12 ENCOUNTER — APPOINTMENT (OUTPATIENT)
Facility: HOSPITAL | Age: 69
End: 2024-09-12
Payer: MEDICARE

## 2024-09-15 NOTE — BH NOTE
[facility] Outpatient Program  Group Therapy    Date of Service: 5/17/2023  Start time: 1200  Stop time: 1250  Type of session: Therapy Group    Problem number: 1Dep. F33.1    Short term goal (STG): C .  Pt will identify times she has been able to use willingness to turn towards emotions and symptoms and how acceptance of the moment can help her get through crises.       Intervention/techniques: Informed, Validated/Supported, Guided, Challenged, Listened/Empathized, Observed/Monitored, Clarified, and Reinforced    Patient mental status/affect: Calm and Congruent    Patient behavior/appearance: Attentive, Cooperative, and Motivated    Special patient treatment accommodations provided (describe): None    Patient response and progress towards goals: Focus of session was on issues and emotional baggage from the past and how it may continue to be impacting our functioning today. We discussed how we can have anger towards family members and that may have been what has protected us from getting hurt again. I spoke with group about the need to accept other people's limitations and how if we don't accept that, then we get continuously hurt and angry towards others. We spoke about how we have to move forward and that may include accepting a need to forgive others and to accept them as they are. If we cannot be around them much, then that is how they take care of themselves. Group members were asked to share something that they can let go of today from the past so they can lighten their burden one step at a time. Pt participated int he group activity and engaged with the other members. Pt shared that she felt she had so much weight on her shoulders prior to retiring. Now many of those burdens are gone and she finds herself having a lot of time and doesn't know what to do with herself.   She still takes care of her son but knows she needs to engage in outside activities for her health Breath sounds clear and equal bilaterally. By PA Oliver/IV discontinued, cath removed intact

## 2024-09-18 ENCOUNTER — HOSPITAL ENCOUNTER (OUTPATIENT)
Facility: HOSPITAL | Age: 69
Setting detail: RECURRING SERIES
Discharge: HOME | End: 2024-09-21
Payer: MEDICARE

## 2024-09-18 PROCEDURE — 90853 GROUP PSYCHOTHERAPY: CPT

## 2024-09-25 ENCOUNTER — HOSPITAL ENCOUNTER (OUTPATIENT)
Facility: HOSPITAL | Age: 69
Setting detail: RECURRING SERIES
Discharge: HOME | End: 2024-09-28
Payer: MEDICARE

## 2024-09-25 PROCEDURE — 90853 GROUP PSYCHOTHERAPY: CPT

## 2024-11-06 ENCOUNTER — HOSPITAL ENCOUNTER (OUTPATIENT)
Facility: HOSPITAL | Age: 69
Discharge: HOME | End: 2024-11-09
Payer: MEDICARE

## 2024-11-06 PROCEDURE — 99214 OFFICE O/P EST MOD 30 MIN: CPT | Performed by: PSYCHIATRY & NEUROLOGY

## 2024-11-06 RX ORDER — MIRTAZAPINE 45 MG/1
22.5 TABLET, FILM COATED ORAL NIGHTLY
Qty: 90 TABLET | Refills: 0 | Status: SHIPPED | COMMUNITY
Start: 2024-11-06

## 2024-11-06 NOTE — PROGRESS NOTES
CC: \"I don't know\"    INTERVAL HISTORY (In Person): Ms. Bermeo is a 69-year-old  female who is following up with me 2 months since I last saw her during her treatment In out SOP program (3/2023--9/2024). She has a history of likely Bipolar disorder vs Major Depression, with a predominance of depressive episodes. During her time in the IOP, her mood improved and stabilized on the Lamictal and Remeron. Has had bad SE's with several SSRI's and was hypomanic with Wellbutrin, and Trintellix was ineffective.     She comes in today and states that she's still been having some some down periods, and that after the election yesterday, she's really distressed. Her mood is only mild to moderately dysphoric but her N/V functioning has been moderately impaired--sleep is fair but she's not eating much (lost 8#), energy and motivation are very poor, and she's been more anhedonic overall. However, she denies feeling hopeless or having any SI at all. We discussed Rx options in some detail, and she's agreeable to increasing the Remeron slightly to 22.5 mg qhs. She's had some SE's with it at 45 mg and even at 30 mg as well. She's also had SE's with a lot of other AD's in the past, but she needs more of an AD effect. Tolerating the Lamictal well--no rash or other SE's, and she says she's been very compliant with the meds.      CURRENT MEDICATIONS:   Lamictal 100 mg bid  Remeron 15 mg qhs    MENTAL STATUS EXAMINATION:  January was noted to be a casually dressed, adequately groomed 69-year-old who appeared her stated age.  She was pleasant and cooperative, and exhibited a fairly full affective range. She reported having some mild to moderate depressive symptoms and her neurovegetative functioning appeared to be moderately impaired, as well.  She denied having any suicidal thoughts or intentions, and there was no evidence of any anibal or hypomania. There was no evidence of any psychosis such as hallucinations or delusions.

## 2024-12-04 ENCOUNTER — OFFICE VISIT (OUTPATIENT)
Age: 69
End: 2024-12-04
Payer: MEDICARE

## 2024-12-04 VITALS
RESPIRATION RATE: 18 BRPM | TEMPERATURE: 97.7 F | OXYGEN SATURATION: 97 % | DIASTOLIC BLOOD PRESSURE: 80 MMHG | BODY MASS INDEX: 25.76 KG/M2 | WEIGHT: 154.6 LBS | SYSTOLIC BLOOD PRESSURE: 128 MMHG | HEIGHT: 65 IN

## 2024-12-04 DIAGNOSIS — R92.30 DENSE BREAST: ICD-10-CM

## 2024-12-04 DIAGNOSIS — E03.9 ACQUIRED HYPOTHYROIDISM: Primary | ICD-10-CM

## 2024-12-04 DIAGNOSIS — Z91.81 AT HIGH RISK FOR FALLS: ICD-10-CM

## 2024-12-04 DIAGNOSIS — R92.8 MAMMOGRAM ABNORMAL: ICD-10-CM

## 2024-12-04 DIAGNOSIS — K62.89 RECTAL PAIN: ICD-10-CM

## 2024-12-04 PROCEDURE — 1090F PRES/ABSN URINE INCON ASSESS: CPT | Performed by: NURSE PRACTITIONER

## 2024-12-04 PROCEDURE — 3017F COLORECTAL CA SCREEN DOC REV: CPT | Performed by: NURSE PRACTITIONER

## 2024-12-04 PROCEDURE — 1123F ACP DISCUSS/DSCN MKR DOCD: CPT | Performed by: NURSE PRACTITIONER

## 2024-12-04 PROCEDURE — 1126F AMNT PAIN NOTED NONE PRSNT: CPT | Performed by: NURSE PRACTITIONER

## 2024-12-04 PROCEDURE — G8399 PT W/DXA RESULTS DOCUMENT: HCPCS | Performed by: NURSE PRACTITIONER

## 2024-12-04 PROCEDURE — G8427 DOCREV CUR MEDS BY ELIG CLIN: HCPCS | Performed by: NURSE PRACTITIONER

## 2024-12-04 PROCEDURE — 4004F PT TOBACCO SCREEN RCVD TLK: CPT | Performed by: NURSE PRACTITIONER

## 2024-12-04 PROCEDURE — 99214 OFFICE O/P EST MOD 30 MIN: CPT | Performed by: NURSE PRACTITIONER

## 2024-12-04 PROCEDURE — G8419 CALC BMI OUT NRM PARAM NOF/U: HCPCS | Performed by: NURSE PRACTITIONER

## 2024-12-04 PROCEDURE — G8484 FLU IMMUNIZE NO ADMIN: HCPCS | Performed by: NURSE PRACTITIONER

## 2024-12-04 PROCEDURE — 1159F MED LIST DOCD IN RCRD: CPT | Performed by: NURSE PRACTITIONER

## 2024-12-04 RX ORDER — CICLOPIROX 80 MG/ML
SOLUTION TOPICAL
Qty: 6 ML | Refills: 1 | Status: SHIPPED | OUTPATIENT
Start: 2024-12-04

## 2024-12-04 RX ORDER — LEVOTHYROXINE SODIUM 150 UG/1
150 TABLET ORAL DAILY
Qty: 90 TABLET | Refills: 0 | Status: SHIPPED | OUTPATIENT
Start: 2024-12-04

## 2024-12-04 ASSESSMENT — PATIENT HEALTH QUESTIONNAIRE - PHQ9
5. POOR APPETITE OR OVEREATING: NOT AT ALL
4. FEELING TIRED OR HAVING LITTLE ENERGY: NOT AT ALL
SUM OF ALL RESPONSES TO PHQ9 QUESTIONS 1 & 2: 0
SUM OF ALL RESPONSES TO PHQ QUESTIONS 1-9: 0
7. TROUBLE CONCENTRATING ON THINGS, SUCH AS READING THE NEWSPAPER OR WATCHING TELEVISION: NOT AT ALL
1. LITTLE INTEREST OR PLEASURE IN DOING THINGS: NOT AT ALL
SUM OF ALL RESPONSES TO PHQ QUESTIONS 1-9: 0
8. MOVING OR SPEAKING SO SLOWLY THAT OTHER PEOPLE COULD HAVE NOTICED. OR THE OPPOSITE, BEING SO FIGETY OR RESTLESS THAT YOU HAVE BEEN MOVING AROUND A LOT MORE THAN USUAL: NOT AT ALL
SUM OF ALL RESPONSES TO PHQ QUESTIONS 1-9: 0
3. TROUBLE FALLING OR STAYING ASLEEP: NOT AT ALL
SUM OF ALL RESPONSES TO PHQ QUESTIONS 1-9: 0
10. IF YOU CHECKED OFF ANY PROBLEMS, HOW DIFFICULT HAVE THESE PROBLEMS MADE IT FOR YOU TO DO YOUR WORK, TAKE CARE OF THINGS AT HOME, OR GET ALONG WITH OTHER PEOPLE: NOT DIFFICULT AT ALL
9. THOUGHTS THAT YOU WOULD BE BETTER OFF DEAD, OR OF HURTING YOURSELF: NOT AT ALL
6. FEELING BAD ABOUT YOURSELF - OR THAT YOU ARE A FAILURE OR HAVE LET YOURSELF OR YOUR FAMILY DOWN: NOT AT ALL
2. FEELING DOWN, DEPRESSED OR HOPELESS: NOT AT ALL

## 2024-12-04 NOTE — PROGRESS NOTES
\"Have you been to the ER, urgent care clinic since your last visit?  Hospitalized since your last visit?\"    NO    “Have you seen or consulted any other health care providers outside of [facility] since your last visit?”    NO            Click Here for Release of Records Request      Chief Complaint   Patient presents with    Results     mammogram         Vitals:    12/04/24 1501   BP: 128/80   Resp: 18   Temp: 97.7 °F (36.5 °C)   SpO2: 97%

## 2024-12-04 NOTE — PROGRESS NOTES
On the basis of positive falls risk screening, assessment and plan is as follows: patient declines any further evaluation/treatment for increased falls risk.    Subjective:     January Bermeo is a 69 y.o. female who presents today with the following:  Chief Complaint   Patient presents with    Results     mammogram       Patient Active Problem List   Diagnosis    Tobacco use    Marijuana use    S/P lumbar fusion    Acquired hypothyroidism    Lumbar stenosis with neurogenic claudication    Chronic fatigue    Chronic pain syndrome    Nocturnal hypoxemia    Gastroesophageal reflux disease without esophagitis    Vitamin D deficiency    Vitamin B12 deficiency    Other emphysema (HCC)    Lumbar stenosis    Spondylisthesis    Abnormal weight loss    Major depressive disorder, recurrent episode, mild (HCC)    Malignant neoplasm of upper-outer quadrant of right female breast, unspecified estrogen receptor status (HCC)    Bipolar II disorder (HCC)   January Bermeo historian    Tyrer-Cuzick  score 31.19% MRI screening recommended.    Rectal pain and bleeding ongoing concern. Intermittent. She requests a referral to Dr. Modi.    COMPLIANT WITH MEDICATION:   Denies chest pain, dyspnea, palpitations, headache and blurred vision. Blood pressure normotensive.    depression screening addressed not at risk    abuse screening addressed denies    learning assessment addressed reviewed nurses notes    fall risk addressed not at risk    HM: addressed she declines immunizations at this time.    ROS:  Gen: denies fever, chills, fatigue, weight loss, weight gain  HEENT:denies blurry vision, nasal congestion, sore throat  Resp: denies dypsnea, cough, wheezing  CV: denies chest pain radiating to the jaws or arms, palpitations, lower extremity edema  Abd: denies nausea, vomiting, diarrhea, constipation  Neuro: denies numbness/tingling  Endo: denies polyuria, polydipsia, heat/cold intolerance  Heme: no lymphadenopathy    Allergies

## 2025-01-01 DIAGNOSIS — F41.8 OTHER SPECIFIED ANXIETY DISORDERS: ICD-10-CM

## 2025-01-01 DIAGNOSIS — I10 ESSENTIAL (PRIMARY) HYPERTENSION: ICD-10-CM

## 2025-01-02 RX ORDER — HYDROCHLOROTHIAZIDE 12.5 MG/1
TABLET ORAL
Qty: 90 TABLET | Refills: 3 | Status: SHIPPED | OUTPATIENT
Start: 2025-01-02

## 2025-01-02 RX ORDER — LISINOPRIL 20 MG/1
TABLET ORAL
Qty: 90 TABLET | Refills: 3 | Status: SHIPPED | OUTPATIENT
Start: 2025-01-02

## 2025-01-02 RX ORDER — POTASSIUM CHLORIDE 750 MG/1
10 TABLET, EXTENDED RELEASE ORAL DAILY
Qty: 90 TABLET | Refills: 1 | Status: SHIPPED | OUTPATIENT
Start: 2025-01-02

## 2025-03-05 ENCOUNTER — HOSPITAL ENCOUNTER (OUTPATIENT)
Facility: HOSPITAL | Age: 70
Discharge: HOME | End: 2025-03-08
Payer: MEDICARE

## 2025-03-05 DIAGNOSIS — R92.8 MAMMOGRAM ABNORMAL: ICD-10-CM

## 2025-03-05 DIAGNOSIS — R92.30 DENSE BREAST: ICD-10-CM

## 2025-03-05 PROCEDURE — A9575 INJ GADOTERATE MEGLUMI 0.1ML: HCPCS | Performed by: NURSE PRACTITIONER

## 2025-03-05 PROCEDURE — C8908 MRI W/O FOL W/CONT, BREAST,: HCPCS

## 2025-03-05 PROCEDURE — 6360000004 HC RX CONTRAST MEDICATION: Performed by: NURSE PRACTITIONER

## 2025-03-05 RX ADMIN — GADOTERATE MEGLUMINE 14 ML: 376.9 INJECTION INTRAVENOUS at 12:56

## 2025-03-10 ENCOUNTER — OFFICE VISIT (OUTPATIENT)
Age: 70
End: 2025-03-10

## 2025-03-10 VITALS
HEART RATE: 88 BPM | RESPIRATION RATE: 18 BRPM | BODY MASS INDEX: 25.16 KG/M2 | HEIGHT: 65 IN | TEMPERATURE: 98.2 F | DIASTOLIC BLOOD PRESSURE: 60 MMHG | WEIGHT: 151 LBS | SYSTOLIC BLOOD PRESSURE: 159 MMHG | OXYGEN SATURATION: 94 %

## 2025-03-10 DIAGNOSIS — Z12.11 COLON CANCER SCREENING: Primary | ICD-10-CM

## 2025-03-10 ASSESSMENT — PATIENT HEALTH QUESTIONNAIRE - PHQ9
SUM OF ALL RESPONSES TO PHQ QUESTIONS 1-9: 0
SUM OF ALL RESPONSES TO PHQ QUESTIONS 1-9: 0
2. FEELING DOWN, DEPRESSED OR HOPELESS: NOT AT ALL
SUM OF ALL RESPONSES TO PHQ QUESTIONS 1-9: 0
1. LITTLE INTEREST OR PLEASURE IN DOING THINGS: NOT AT ALL
SUM OF ALL RESPONSES TO PHQ QUESTIONS 1-9: 0

## 2025-03-10 NOTE — PROGRESS NOTES
Identified pt with two pt identifiers (name and ). Reviewed chart in preparation for visit and have obtained necessary documentation.    January Bermeo is a 69 y.o. female New Patient and Rectal Pain  .    Vitals:    03/10/25 1440 03/10/25 1443   BP: (!) 157/96 (!) 159/60   BP Site: Right Upper Arm    Patient Position: Sitting    BP Cuff Size: Medium Adult    Pulse: 88    Resp: 18    Temp: 98.2 °F (36.8 °C)    TempSrc: Oral    SpO2: 94%    Weight: 68.5 kg (151 lb)    Height: 1.651 m (5' 5\")           1. Have you been to the ER, urgent care clinic since your last visit?  Hospitalized since your last visit?  no     2. Have you seen or consulted any other health care providers outside of the [facility] since your last visit?  Include any pap smears or colon screening.  noIdentified pt with two pt identifiers (name and ). Reviewed chart in preparation for visit and have obtained necessary documentation.    January Bermeo is a 69 y.o. female New Patient and Rectal Pain  .    Vitals:    03/10/25 1440 03/10/25 1443   BP: (!) 157/96 (!) 159/60   BP Site: Right Upper Arm    Patient Position: Sitting    BP Cuff Size: Medium Adult    Pulse: 88    Resp: 18    Temp: 98.2 °F (36.8 °C)    TempSrc: Oral    SpO2: 94%    Weight: 68.5 kg (151 lb)    Height: 1.651 m (5' 5\")           1. Have you been to the ER, urgent care clinic since your last visit?  Hospitalized since your last visit?  no     2. Have you seen or consulted any other health care providers outside of the [facility] since your last visit?  Include any pap smears or colon screening.  no

## 2025-03-10 NOTE — PROGRESS NOTES
January Bermeo is a 69 y.o. female who presents today with the following:  Chief Complaint   Patient presents with    New Patient    Rectal Pain       HPI    69-year-old female who I seen back in 2022 for consideration of colonoscopy.  Her last colonoscopy was in September 2018 while in [place].  She was found to have 2 hyperplastic polyps and diverticulosis.  She has no first-degree relative with colon cancer and as far she is aware if she has never had an adenomatous polyp.  She also had FIT testing in October 2024 which was negative.  When I had initially seen her she had occasionally been having some rectal pain and some rectal fullness.  She feels like this is improved over time with some changes that she has made with probiotics.  She denies any melena or hematochezia.  She specifically denies any rectal bleeding.    At the time of anticipation of her last colonoscopy she was unable to tolerate even the pills for the prep and admits to not drinking a significant amount of liquid.  She is hesitant to proceed with any additional screening.  When questioned on what type of prep she took in 2018 she states that she was younger at that point and had no problems tolerating the prep.    Past Medical History:   Diagnosis Date    Anxiety     Arthritis     Cataract     Chronic obstructive pulmonary disease (HCC)     Chronic pain of right groin     after multiple complications from bladder sling that became imbedded    Depression     GERD (gastroesophageal reflux disease)     HTN (hypertension)     Lobular carcinoma in situ of breast 2009    right breast bx LCIS    Lumbar stenosis with neurogenic claudication 10/15/2021    Pneumonia     Spondylisthesis 10/15/2021    Thyroid disease     UTI (urinary tract infection)        Past Surgical History:   Procedure Laterality Date    ADENOIDECTOMY  1960    BIOPSY OF BREAST, INCISIONAL Bilateral     BLADDER SUSPENSION      Removed     BREAST BIOPSY Right 2009    LCIS    BREAST

## 2025-04-08 DIAGNOSIS — E03.9 ACQUIRED HYPOTHYROIDISM: ICD-10-CM

## 2025-04-08 NOTE — TELEPHONE ENCOUNTER
Refills have been requested for the following medications:         levothyroxine (SYNTHROID) 150 MCG tablet [FRANK Peña - NP]     Preferred pharmacy: [id number]  [place] [address] [address] [phone] - F [phone]

## 2025-04-09 RX ORDER — LEVOTHYROXINE SODIUM 150 UG/1
150 TABLET ORAL DAILY
Qty: 90 TABLET | Refills: 0 | Status: SHIPPED | OUTPATIENT
Start: 2025-04-09

## 2025-07-06 DIAGNOSIS — E03.9 ACQUIRED HYPOTHYROIDISM: ICD-10-CM

## 2025-07-07 RX ORDER — LAMOTRIGINE 100 MG/1
100 TABLET ORAL 2 TIMES DAILY
Qty: 180 TABLET | Refills: 3 | Status: SHIPPED | OUTPATIENT
Start: 2025-07-07

## 2025-07-07 RX ORDER — LEVOTHYROXINE SODIUM 150 UG/1
150 TABLET ORAL DAILY
Qty: 90 TABLET | Refills: 0 | Status: SHIPPED | OUTPATIENT
Start: 2025-07-07

## 2025-07-07 RX ORDER — POTASSIUM CHLORIDE 750 MG/1
10 TABLET, EXTENDED RELEASE ORAL DAILY
Qty: 90 TABLET | Refills: 1 | Status: SHIPPED | OUTPATIENT
Start: 2025-07-07

## 2025-08-27 ENCOUNTER — OFFICE VISIT (OUTPATIENT)
Age: 70
End: 2025-08-27
Payer: MEDICARE

## 2025-08-27 VITALS
BODY MASS INDEX: 23.46 KG/M2 | HEIGHT: 66 IN | DIASTOLIC BLOOD PRESSURE: 78 MMHG | TEMPERATURE: 97 F | WEIGHT: 146 LBS | SYSTOLIC BLOOD PRESSURE: 112 MMHG | OXYGEN SATURATION: 97 % | RESPIRATION RATE: 22 BRPM | HEART RATE: 80 BPM

## 2025-08-27 DIAGNOSIS — E55.9 VITAMIN D DEFICIENCY: ICD-10-CM

## 2025-08-27 DIAGNOSIS — C50.411 MALIGNANT NEOPLASM OF UPPER-OUTER QUADRANT OF RIGHT FEMALE BREAST, UNSPECIFIED ESTROGEN RECEPTOR STATUS (HCC): ICD-10-CM

## 2025-08-27 DIAGNOSIS — F31.81 BIPOLAR II DISORDER (HCC): ICD-10-CM

## 2025-08-27 DIAGNOSIS — R53.82 CHRONIC FATIGUE: ICD-10-CM

## 2025-08-27 DIAGNOSIS — Z00.00 MEDICARE ANNUAL WELLNESS VISIT, SUBSEQUENT: ICD-10-CM

## 2025-08-27 DIAGNOSIS — Z12.31 ENCOUNTER FOR SCREENING MAMMOGRAM FOR MALIGNANT NEOPLASM OF BREAST: ICD-10-CM

## 2025-08-27 DIAGNOSIS — E03.9 ACQUIRED HYPOTHYROIDISM: ICD-10-CM

## 2025-08-27 DIAGNOSIS — Z12.11 SCREENING FOR MALIGNANT NEOPLASM OF COLON: ICD-10-CM

## 2025-08-27 DIAGNOSIS — R79.9 ABNORMAL FINDING OF BLOOD CHEMISTRY, UNSPECIFIED: ICD-10-CM

## 2025-08-27 DIAGNOSIS — Z00.00 ENCOUNTER FOR MEDICARE ANNUAL WELLNESS EXAM: Primary | ICD-10-CM

## 2025-08-27 DIAGNOSIS — J41.0 SIMPLE CHRONIC BRONCHITIS (HCC): ICD-10-CM

## 2025-08-27 DIAGNOSIS — Z87.891 PERSONAL HISTORY OF TOBACCO USE: ICD-10-CM

## 2025-08-27 PROCEDURE — G0296 VISIT TO DETERM LDCT ELIG: HCPCS | Performed by: NURSE PRACTITIONER

## 2025-08-27 PROCEDURE — 3023F SPIROM DOC REV: CPT | Performed by: NURSE PRACTITIONER

## 2025-08-27 PROCEDURE — 3017F COLORECTAL CA SCREEN DOC REV: CPT | Performed by: NURSE PRACTITIONER

## 2025-08-27 PROCEDURE — 4004F PT TOBACCO SCREEN RCVD TLK: CPT | Performed by: NURSE PRACTITIONER

## 2025-08-27 PROCEDURE — G0439 PPPS, SUBSEQ VISIT: HCPCS | Performed by: NURSE PRACTITIONER

## 2025-08-27 PROCEDURE — 36415 COLL VENOUS BLD VENIPUNCTURE: CPT | Performed by: NURSE PRACTITIONER

## 2025-08-27 PROCEDURE — 99214 OFFICE O/P EST MOD 30 MIN: CPT | Performed by: NURSE PRACTITIONER

## 2025-08-27 PROCEDURE — G8420 CALC BMI NORM PARAMETERS: HCPCS | Performed by: NURSE PRACTITIONER

## 2025-08-27 PROCEDURE — G8399 PT W/DXA RESULTS DOCUMENT: HCPCS | Performed by: NURSE PRACTITIONER

## 2025-08-27 PROCEDURE — G8427 DOCREV CUR MEDS BY ELIG CLIN: HCPCS | Performed by: NURSE PRACTITIONER

## 2025-08-27 PROCEDURE — 1159F MED LIST DOCD IN RCRD: CPT | Performed by: NURSE PRACTITIONER

## 2025-08-27 PROCEDURE — 1126F AMNT PAIN NOTED NONE PRSNT: CPT | Performed by: NURSE PRACTITIONER

## 2025-08-27 PROCEDURE — 1123F ACP DISCUSS/DSCN MKR DOCD: CPT | Performed by: NURSE PRACTITIONER

## 2025-08-27 PROCEDURE — 1090F PRES/ABSN URINE INCON ASSESS: CPT | Performed by: NURSE PRACTITIONER

## 2025-08-27 RX ORDER — QUETIAPINE FUMARATE 25 MG/1
25 TABLET, FILM COATED ORAL NIGHTLY
Qty: 30 TABLET | Refills: 2 | Status: SHIPPED | OUTPATIENT
Start: 2025-08-27

## 2025-08-27 SDOH — ECONOMIC STABILITY: FOOD INSECURITY: WITHIN THE PAST 12 MONTHS, THE FOOD YOU BOUGHT JUST DIDN'T LAST AND YOU DIDN'T HAVE MONEY TO GET MORE.: NEVER TRUE

## 2025-08-27 SDOH — ECONOMIC STABILITY: FOOD INSECURITY: WITHIN THE PAST 12 MONTHS, YOU WORRIED THAT YOUR FOOD WOULD RUN OUT BEFORE YOU GOT MONEY TO BUY MORE.: NEVER TRUE

## 2025-08-27 ASSESSMENT — LIFESTYLE VARIABLES
HOW OFTEN DO YOU HAVE A DRINK CONTAINING ALCOHOL: 2-4 TIMES A MONTH
HOW MANY STANDARD DRINKS CONTAINING ALCOHOL DO YOU HAVE ON A TYPICAL DAY: 1 OR 2

## 2025-08-27 ASSESSMENT — PATIENT HEALTH QUESTIONNAIRE - PHQ9
4. FEELING TIRED OR HAVING LITTLE ENERGY: NEARLY EVERY DAY
9. THOUGHTS THAT YOU WOULD BE BETTER OFF DEAD, OR OF HURTING YOURSELF: NOT AT ALL
7. TROUBLE CONCENTRATING ON THINGS, SUCH AS READING THE NEWSPAPER OR WATCHING TELEVISION: SEVERAL DAYS
8. MOVING OR SPEAKING SO SLOWLY THAT OTHER PEOPLE COULD HAVE NOTICED. OR THE OPPOSITE, BEING SO FIGETY OR RESTLESS THAT YOU HAVE BEEN MOVING AROUND A LOT MORE THAN USUAL: NEARLY EVERY DAY
5. POOR APPETITE OR OVEREATING: NEARLY EVERY DAY
6. FEELING BAD ABOUT YOURSELF - OR THAT YOU ARE A FAILURE OR HAVE LET YOURSELF OR YOUR FAMILY DOWN: NOT AT ALL
1. LITTLE INTEREST OR PLEASURE IN DOING THINGS: SEVERAL DAYS
2. FEELING DOWN, DEPRESSED OR HOPELESS: NEARLY EVERY DAY
SUM OF ALL RESPONSES TO PHQ QUESTIONS 1-9: 14
10. IF YOU CHECKED OFF ANY PROBLEMS, HOW DIFFICULT HAVE THESE PROBLEMS MADE IT FOR YOU TO DO YOUR WORK, TAKE CARE OF THINGS AT HOME, OR GET ALONG WITH OTHER PEOPLE: SOMEWHAT DIFFICULT
3. TROUBLE FALLING OR STAYING ASLEEP: NOT AT ALL

## 2025-08-28 LAB
25(OH)D3 SERPL-MCNC: 34.2 NG/ML (ref 30–100)
ALBUMIN SERPL-MCNC: 4 G/DL (ref 3.5–5.2)
ALBUMIN/GLOB SERPL: 1.3 (ref 1.1–2.2)
ALP SERPL-CCNC: 70 U/L (ref 35–104)
ALT SERPL-CCNC: 10 U/L (ref 10–35)
ANION GAP SERPL CALC-SCNC: 9 MMOL/L (ref 2–14)
AST SERPL-CCNC: 14 U/L (ref 10–35)
BASOPHILS # BLD: 0.07 K/UL (ref 0–0.1)
BASOPHILS NFR BLD: 0.9 % (ref 0–1)
BILIRUB SERPL-MCNC: 0.3 MG/DL (ref 0–1.2)
BUN SERPL-MCNC: 17 MG/DL (ref 8–23)
BUN/CREAT SERPL: 25 (ref 12–20)
CALCIUM SERPL-MCNC: 10 MG/DL (ref 8.8–10.2)
CHLORIDE SERPL-SCNC: 103 MMOL/L (ref 98–107)
CHOLEST SERPL-MCNC: 177 MG/DL (ref 0–200)
CO2 SERPL-SCNC: 30 MMOL/L (ref 20–29)
CREAT SERPL-MCNC: 0.68 MG/DL (ref 0.6–1)
DIFFERENTIAL METHOD BLD: NORMAL
EOSINOPHIL # BLD: 0.06 K/UL (ref 0–0.4)
EOSINOPHIL NFR BLD: 0.7 % (ref 0–7)
ERYTHROCYTE [DISTWIDTH] IN BLOOD BY AUTOMATED COUNT: 13.2 % (ref 11.5–14.5)
EST. AVERAGE GLUCOSE BLD GHB EST-MCNC: 103 MG/DL
GLOBULIN SER CALC-MCNC: 3 G/DL (ref 2–4)
GLUCOSE SERPL-MCNC: 90 MG/DL (ref 65–100)
HBA1C MFR BLD: 5.2 % (ref 4–5.6)
HCT VFR BLD AUTO: 43.6 % (ref 35–47)
HDLC SERPL-MCNC: 65 MG/DL (ref 40–60)
HDLC SERPL: 2.7 (ref 0–5)
HGB BLD-MCNC: 14.5 G/DL (ref 11.5–16)
IMM GRANULOCYTES # BLD AUTO: 0.01 K/UL (ref 0–0.04)
IMM GRANULOCYTES NFR BLD AUTO: 0.1 % (ref 0–0.5)
LDLC SERPL CALC-MCNC: 93 MG/DL (ref 0–100)
LYMPHOCYTES # BLD: 2.13 K/UL (ref 0.8–3.5)
LYMPHOCYTES NFR BLD: 26.4 % (ref 12–49)
MCH RBC QN AUTO: 32.4 PG (ref 26–34)
MCHC RBC AUTO-ENTMCNC: 33.3 G/DL (ref 30–36.5)
MCV RBC AUTO: 97.5 FL (ref 80–99)
MONOCYTES # BLD: 0.62 K/UL (ref 0–1)
MONOCYTES NFR BLD: 7.7 % (ref 5–13)
NEUTS SEG # BLD: 5.18 K/UL (ref 1.8–8)
NEUTS SEG NFR BLD: 64.2 % (ref 32–75)
NRBC # BLD: 0 K/UL (ref 0–0.01)
NRBC BLD-RTO: 0 PER 100 WBC
PLATELET # BLD AUTO: 231 K/UL (ref 150–400)
PMV BLD AUTO: 10.2 FL (ref 8.9–12.9)
POTASSIUM SERPL-SCNC: 4.4 MMOL/L (ref 3.5–5.1)
PROT SERPL-MCNC: 7 G/DL (ref 6.4–8.3)
RBC # BLD AUTO: 4.47 M/UL (ref 3.8–5.2)
SODIUM SERPL-SCNC: 142 MMOL/L (ref 136–145)
T4 FREE SERPL-MCNC: 1.7 NG/DL (ref 0.9–1.6)
TRIGL SERPL-MCNC: 94 MG/DL (ref 0–150)
TSH, 3RD GENERATION: 0.17 UIU/ML (ref 0.27–4.2)
VLDLC SERPL CALC-MCNC: 19 MG/DL
WBC # BLD AUTO: 8.1 K/UL (ref 3.6–11)

## 2025-08-29 LAB — LAMOTRIGINE SERPL-MCNC: 5.6 UG/ML (ref 2–20)

## (undated) DEVICE — TOTAL TRAY, 16FR 10ML SIL FOLEY, URN: Brand: MEDLINE

## (undated) DEVICE — SOLUTION IV 250ML 0.9% SOD CHL CLR INJ FLX BG CONT PRT CLSR

## (undated) DEVICE — 4-PORT MANIFOLD: Brand: NEPTUNE 2

## (undated) DEVICE — SPHERE STRL PASSIVE MARKER 60

## (undated) DEVICE — PREP KIT PEEL PTCH POVIDONE IOD

## (undated) DEVICE — GLOVE ORANGE PI 7 1/2   MSG9075

## (undated) DEVICE — POSITIONER HD REST FOAM CMFRT TCH

## (undated) DEVICE — INFECTION CONTROL KIT SYS

## (undated) DEVICE — TRANSFER SET 3": Brand: MEDLINE INDUSTRIES, INC.

## (undated) DEVICE — C-ARM: Brand: UNBRANDED

## (undated) DEVICE — STERILE-Z SURGICAL PATIENT COVERS CLEAR POLYETHYLENE STERILE UNIVERSAL FIT 10 PER CASE: Brand: STERILE-Z

## (undated) DEVICE — FLOSEAL HEMOSTATIC MATRIX, 10ML: Brand: FLOSEAL HEMOSTATIC MATRIX

## (undated) DEVICE — Device

## (undated) DEVICE — COVER,TABLE,HEAVY DUTY,60"X90",STRL: Brand: MEDLINE

## (undated) DEVICE — SOLUTION IRRIG 3000ML 0.9% SOD CHL USP UROMATIC PLAS CONT

## (undated) DEVICE — COVER,MAYO STAND,STERILE: Brand: MEDLINE

## (undated) DEVICE — SPONGE GZ W4XL4IN COT 12 PLY TYP VII WVN C FLD DSGN

## (undated) DEVICE — BONE WAX WHITE: Brand: BONE WAX WHITE

## (undated) DEVICE — NEEDLE HYPO 22GA L1.5IN BLK S STL HUB POLYPR SHLD REG BVL

## (undated) DEVICE — BIPOLAR IRRIGATOR INTEGRATED TUBING AND BIPOLAR CORD SET, DISPOSABLE

## (undated) DEVICE — HANDPIECE SET WITH BONE CLEANING TIP AND SUCTION TUBE: Brand: INTERPULSE

## (undated) DEVICE — SUTURE VCRL 2-0 L27IN ABSRB UD CP-2 L26MM 1/2 CIR REV CUT J869H

## (undated) DEVICE — LAMINECTOMY-SMH: Brand: MEDLINE INDUSTRIES, INC.

## (undated) DEVICE — KIT EVAC 0.13IN RECT TB DIA10FR 400CC PVC 3 SPR Y CONN DRN

## (undated) DEVICE — TOOL 14MH30 LEGEND 14CM 3MM: Brand: MIDAS REX ™

## (undated) DEVICE — GLOVE SURG SZ 8 L12IN FNGR THK94MIL STD WHT LTX FREE